# Patient Record
Sex: FEMALE | Race: WHITE | NOT HISPANIC OR LATINO | Employment: OTHER | URBAN - METROPOLITAN AREA
[De-identification: names, ages, dates, MRNs, and addresses within clinical notes are randomized per-mention and may not be internally consistent; named-entity substitution may affect disease eponyms.]

---

## 2017-06-12 ENCOUNTER — HISTORICAL (OUTPATIENT)
Dept: INFUSION THERAPY | Facility: HOSPITAL | Age: 62
End: 2017-06-12

## 2018-07-10 ENCOUNTER — HISTORICAL (OUTPATIENT)
Dept: INFUSION THERAPY | Facility: HOSPITAL | Age: 63
End: 2018-07-10

## 2019-05-26 LAB
CHOLEST SERPL-MSCNC: 130 MG/DL (ref 0–200)
HDLC SERPL-MCNC: 68 MG/DL (ref 35–70)
LDLC SERPL CALC-MCNC: 42 MG/DL (ref 0–160)
TRIGL SERPL-MCNC: 99 MG/DL (ref 40–160)

## 2019-08-19 ENCOUNTER — HISTORICAL (OUTPATIENT)
Dept: INFUSION THERAPY | Facility: HOSPITAL | Age: 64
End: 2019-08-19

## 2020-12-03 ENCOUNTER — HISTORICAL (OUTPATIENT)
Dept: INFUSION THERAPY | Facility: HOSPITAL | Age: 65
End: 2020-12-03

## 2022-04-09 ENCOUNTER — HISTORICAL (OUTPATIENT)
Dept: ADMINISTRATIVE | Facility: HOSPITAL | Age: 67
End: 2022-04-09
Payer: MEDICARE

## 2022-04-25 VITALS
OXYGEN SATURATION: 98 % | DIASTOLIC BLOOD PRESSURE: 70 MMHG | HEIGHT: 63 IN | WEIGHT: 108 LBS | BODY MASS INDEX: 19.14 KG/M2 | SYSTOLIC BLOOD PRESSURE: 110 MMHG

## 2022-07-08 ENCOUNTER — PATIENT OUTREACH (OUTPATIENT)
Dept: ADMINISTRATIVE | Facility: HOSPITAL | Age: 67
End: 2022-07-08
Payer: MEDICARE

## 2022-07-08 NOTE — PROGRESS NOTES
Population Health. Out Reach.  The following record(s)  below were uploaded for Health Maintenance .    5/26/19 LIPID PANEL

## 2022-07-14 DIAGNOSIS — I10 HYPERTENSION, UNSPECIFIED TYPE: ICD-10-CM

## 2022-07-14 DIAGNOSIS — E10.9 TYPE 1 DIABETES MELLITUS WITHOUT COMPLICATION: Primary | ICD-10-CM

## 2022-07-14 DIAGNOSIS — E03.9 HYPOTHYROIDISM, UNSPECIFIED TYPE: ICD-10-CM

## 2022-07-14 RX ORDER — LATANOPROST 50 UG/ML
1 SOLUTION/ DROPS OPHTHALMIC NIGHTLY
COMMUNITY
Start: 2022-01-19

## 2022-07-14 RX ORDER — LEVOTHYROXINE SODIUM 150 UG/1
150 TABLET ORAL
Status: ON HOLD | COMMUNITY
End: 2024-03-03 | Stop reason: ALTCHOICE

## 2022-07-14 RX ORDER — LISINOPRIL 10 MG/1
10 TABLET ORAL DAILY
COMMUNITY

## 2022-07-14 RX ORDER — LORATADINE 10 MG/1
5 TABLET ORAL DAILY
Status: ON HOLD | COMMUNITY
End: 2024-03-03 | Stop reason: ALTCHOICE

## 2022-07-25 ENCOUNTER — OFFICE VISIT (OUTPATIENT)
Dept: INTERNAL MEDICINE | Facility: CLINIC | Age: 67
End: 2022-07-25
Payer: MEDICARE

## 2022-07-25 VITALS
DIASTOLIC BLOOD PRESSURE: 64 MMHG | SYSTOLIC BLOOD PRESSURE: 130 MMHG | OXYGEN SATURATION: 98 % | WEIGHT: 108 LBS | HEART RATE: 96 BPM | HEIGHT: 62 IN | TEMPERATURE: 98 F | BODY MASS INDEX: 19.88 KG/M2

## 2022-07-25 DIAGNOSIS — I15.2 HYPERTENSION DUE TO ENDOCRINE DISORDER: ICD-10-CM

## 2022-07-25 DIAGNOSIS — E03.9 HYPOTHYROIDISM, UNSPECIFIED TYPE: Primary | ICD-10-CM

## 2022-07-25 DIAGNOSIS — Z00.00 MEDICARE ANNUAL WELLNESS VISIT, SUBSEQUENT: ICD-10-CM

## 2022-07-25 DIAGNOSIS — E10.9 TYPE 1 DIABETES MELLITUS WITHOUT COMPLICATION: ICD-10-CM

## 2022-07-25 PROCEDURE — 1159F PR MEDICATION LIST DOCUMENTED IN MEDICAL RECORD: ICD-10-PCS | Mod: CPTII,,, | Performed by: INTERNAL MEDICINE

## 2022-07-25 PROCEDURE — 1160F PR REVIEW ALL MEDS BY PRESCRIBER/CLIN PHARMACIST DOCUMENTED: ICD-10-PCS | Mod: CPTII,,, | Performed by: INTERNAL MEDICINE

## 2022-07-25 PROCEDURE — 3288F FALL RISK ASSESSMENT DOCD: CPT | Mod: CPTII,,, | Performed by: INTERNAL MEDICINE

## 2022-07-25 PROCEDURE — 4010F PR ACE/ARB THEARPY RXD/TAKEN: ICD-10-PCS | Mod: CPTII,,, | Performed by: INTERNAL MEDICINE

## 2022-07-25 PROCEDURE — G0439 PR MEDICARE ANNUAL WELLNESS SUBSEQUENT VISIT: ICD-10-PCS | Mod: ,,, | Performed by: INTERNAL MEDICINE

## 2022-07-25 PROCEDURE — 1159F MED LIST DOCD IN RCRD: CPT | Mod: CPTII,,, | Performed by: INTERNAL MEDICINE

## 2022-07-25 PROCEDURE — 3008F PR BODY MASS INDEX (BMI) DOCUMENTED: ICD-10-PCS | Mod: CPTII,,, | Performed by: INTERNAL MEDICINE

## 2022-07-25 PROCEDURE — G0439 PPPS, SUBSEQ VISIT: HCPCS | Mod: ,,, | Performed by: INTERNAL MEDICINE

## 2022-07-25 PROCEDURE — 1101F PR PT FALLS ASSESS DOC 0-1 FALLS W/OUT INJ PAST YR: ICD-10-PCS | Mod: CPTII,,, | Performed by: INTERNAL MEDICINE

## 2022-07-25 PROCEDURE — 3075F PR MOST RECENT SYSTOLIC BLOOD PRESS GE 130-139MM HG: ICD-10-PCS | Mod: CPTII,,, | Performed by: INTERNAL MEDICINE

## 2022-07-25 PROCEDURE — 1160F RVW MEDS BY RX/DR IN RCRD: CPT | Mod: CPTII,,, | Performed by: INTERNAL MEDICINE

## 2022-07-25 PROCEDURE — 3078F DIAST BP <80 MM HG: CPT | Mod: CPTII,,, | Performed by: INTERNAL MEDICINE

## 2022-07-25 PROCEDURE — 4010F ACE/ARB THERAPY RXD/TAKEN: CPT | Mod: CPTII,,, | Performed by: INTERNAL MEDICINE

## 2022-07-25 PROCEDURE — 1126F PR PAIN SEVERITY QUANTIFIED, NO PAIN PRESENT: ICD-10-PCS | Mod: CPTII,,, | Performed by: INTERNAL MEDICINE

## 2022-07-25 PROCEDURE — 3078F PR MOST RECENT DIASTOLIC BLOOD PRESSURE < 80 MM HG: ICD-10-PCS | Mod: CPTII,,, | Performed by: INTERNAL MEDICINE

## 2022-07-25 PROCEDURE — 3008F BODY MASS INDEX DOCD: CPT | Mod: CPTII,,, | Performed by: INTERNAL MEDICINE

## 2022-07-25 PROCEDURE — 3288F PR FALLS RISK ASSESSMENT DOCUMENTED: ICD-10-PCS | Mod: CPTII,,, | Performed by: INTERNAL MEDICINE

## 2022-07-25 PROCEDURE — 1101F PT FALLS ASSESS-DOCD LE1/YR: CPT | Mod: CPTII,,, | Performed by: INTERNAL MEDICINE

## 2022-07-25 PROCEDURE — 1126F AMNT PAIN NOTED NONE PRSNT: CPT | Mod: CPTII,,, | Performed by: INTERNAL MEDICINE

## 2022-07-25 PROCEDURE — 3075F SYST BP GE 130 - 139MM HG: CPT | Mod: CPTII,,, | Performed by: INTERNAL MEDICINE

## 2022-07-25 RX ORDER — INSULIN ASPART 100 [IU]/ML
67 INJECTION, SOLUTION INTRAVENOUS; SUBCUTANEOUS DAILY
COMMUNITY

## 2022-07-25 RX ORDER — ZOLEDRONIC ACID 5 MG/100ML
5 INJECTION, SOLUTION INTRAVENOUS ONCE
COMMUNITY

## 2022-07-25 NOTE — PROGRESS NOTES
Subjective:      Patient ID: Daisy Calderon is a 67 y.o. female.    Chief Complaint: Annual Exam (Wellness)    Mrs. Villa is a 66-year-old female here today for hospital discharge visit. Medical comorbidities include DM type I, hypertension, and hypothyroidism. Established with Dr. Beltrán who is managing her diabetes and hypothyroidism.  Patient has had episodes of DKA from insulin pump failure however recently has remained stable.  No acute needs or complaints today as such.    Wellness: Today  DM eye exam: May 2021  CRS:  Agreed for Cologuard after a long discussion  MMG: Follow-up next visit order placed today  PPSV23: 2015    The patient's Health Maintenance was reviewed and the following appears to be due at this time:   Health Maintenance Due   Topic Date Due    DEXA Scan  Never done    Colorectal Cancer Screening  Never done    Mammogram  2017        Past Medical History:  Past Medical History:   Diagnosis Date    Diabetes type I     HTN (hypertension)     Hypothyroidism, unspecified     Tobacco user      Past Surgical History:   Procedure Laterality Date    CARPAL TUNNEL RELEASE      CATARACT EXTRACTION       SECTION      CHOLECYSTECTOMY      TOTAL ABDOMINAL HYSTERECTOMY      TUBAL LIGATION       Review of patient's allergies indicates:   Allergen Reactions    Levofloxacin      Other reaction(s): itching, swelling    Oxycodone-aspirin     Brimonidine Nausea And Vomiting     Social History     Socioeconomic History    Marital status: Other   Tobacco Use    Smoking status: Current Every Day Smoker     Packs/day: 0.25    Smokeless tobacco: Never Used   Substance and Sexual Activity    Alcohol use: Yes     Alcohol/week: 6.0 standard drinks     Types: 6 Cans of beer per week     History reviewed. No pertinent family history.    Review of Systems   Constitutional: Negative.         Alert, Oriented and no acute distress   HENT: Negative for congestion, rhinorrhea, sinus pain  "and sore throat.    Eyes: Negative for photophobia and discharge.   Respiratory: Negative for cough, chest tightness, shortness of breath and wheezing.    Cardiovascular: Negative for chest pain, palpitations and leg swelling.   Gastrointestinal: Negative for abdominal distention, constipation, diarrhea, nausea and vomiting.   Genitourinary: Negative for difficulty urinating, dyspareunia, dysuria, frequency, hematuria and menstrual problem.   Musculoskeletal: Negative for arthralgias, joint swelling, myalgias and neck pain.   Skin: Negative for rash.   Allergic/Immunologic: Negative for immunocompromised state.   Neurological: Negative for dizziness, seizures and weakness.   Psychiatric/Behavioral: Negative for agitation and suicidal ideas.       Objective:   /64 (BP Location: Left arm, Patient Position: Sitting, BP Method: Small (Manual))   Pulse 96   Temp 98.4 °F (36.9 °C) (Temporal)   Ht 5' 2" (1.575 m)   Wt 49 kg (108 lb)   SpO2 98%   BMI 19.75 kg/m²       Patient ID: [id number]       ----------------------------  Diabetes type I  HTN (hypertension)  Hypothyroidism, unspecified  Tobacco user     Past Surgical History:   Procedure Laterality Date    CARPAL TUNNEL RELEASE      CATARACT EXTRACTION       SECTION      CHOLECYSTECTOMY      TOTAL ABDOMINAL HYSTERECTOMY      TUBAL LIGATION         Review of patient's allergies indicates:   Allergen Reactions    Levofloxacin      Other reaction(s): itching, swelling    Oxycodone-aspirin     Brimonidine Nausea And Vomiting       Outpatient Medications Marked as Taking for the 22 encounter (Office Visit) with Yana Pearce MD   Medication Sig Dispense Refill    insulin aspart U-100 (NOVOLOG) 100 unit/mL injection Inject 67 Units into the skin Daily.      latanoprost 0.005 % ophthalmic solution Apply 1 drop to eye every evening.      levothyroxine (SYNTHROID) 150 MCG tablet Take 150 mcg by mouth before breakfast.      lisinopriL 10 " "MG tablet Take 10 mg by mouth once daily.      loratadine (CLARITIN) 10 mg tablet Take 5 mg by mouth once daily.      zoledronic acid-mannitol & water (RECLAST) 5 mg/100 mL PgBk Inject 5 mg into the vein once.      [DISCONTINUED] insulin glulisine U-100 (APIDRA) 100 unit/mL InPn pen Inject into the skin.         Social History     Socioeconomic History    Marital status: Other   Tobacco Use    Smoking status: Current Every Day Smoker     Packs/day: 0.25    Smokeless tobacco: Never Used   Substance and Sexual Activity    Alcohol use: Yes     Alcohol/week: 6.0 standard drinks     Types: 6 Cans of beer per week        History reviewed. No pertinent family history.     Patient Care Team:  Yana Pearce MD as PCP - General (Internal Medicine)            Objective:     /64 (BP Location: Left arm, Patient Position: Sitting, BP Method: Small (Manual))   Pulse 96   Temp 98.4 °F (36.9 °C) (Temporal)   Ht 5' 2" (1.575 m)   Wt 49 kg (108 lb)   SpO2 98%   BMI 19.75 kg/m²         No flowsheet data found.  Fall Risk Assessment - Outpatient 7/25/2022   Mobility Status Ambulatory   Number of falls 1   Identified as fall risk 0              Assessment:       ICD-10-CM ICD-9-CM   1. Hypothyroidism, unspecified type  E03.9 244.9   2. Type 1 diabetes mellitus without complication  E10.9 250.01   3. Medicare annual wellness visit, subsequent  Z00.00 V70.0   4. Hypertension due to endocrine disorder  I15.2 405.99     259.9        Plan:     Medicare Annual Wellness and Personalized Prevention Plan:   Fall Risk + Home Safety + Hearing Impairment + Depression Screen + Cognitive Impairment Screen + Health Risk Assessment all reviewed.     Health Maintenance Topics with due status: Not Due       Topic Last Completion Date    Lipid Panel 05/26/2019    Influenza Vaccine 11/01/2021      The patient's Health Maintenance was reviewed and the following appears to be due at this time:   Health Maintenance Due   Topic Date " Due    DEXA Scan  Never done    Colorectal Cancer Screening  Never done    Mammogram  12/14/2017         Advance Care Planning   I attest to discussing Advance Care Planning with patient and/or family member.  Education was provided including the importance of the Health Care Power of , Advance Directives, and/or [place] documentation.  The patient expressed understanding to the importance of this information and discussion.  Goals of Care: The patient expressed that what is most important right now is to focus on:   Length of ACP conversation in minutes:          Medication List with Changes/Refills   Current Medications    INSULIN ASPART U-100 (NOVOLOG) 100 UNIT/ML INJECTION    Inject 67 Units into the skin Daily.       Start Date: --        End Date: --    LATANOPROST 0.005 % OPHTHALMIC SOLUTION    Apply 1 drop to eye every evening.       Start Date: 1/19/2022 End Date: --    LEVOTHYROXINE (SYNTHROID) 150 MCG TABLET    Take 150 mcg by mouth before breakfast.       Start Date: --        End Date: --    LISINOPRIL 10 MG TABLET    Take 10 mg by mouth once daily.       Start Date: --        End Date: --    LORATADINE (CLARITIN) 10 MG TABLET    Take 5 mg by mouth once daily.       Start Date: --        End Date: --    ZOLEDRONIC ACID-MANNITOL & WATER (RECLAST) 5 MG/100 ML PGBK    Inject 5 mg into the vein once.       Start Date: --        End Date: --   Discontinued Medications    INSULIN GLULISINE U-100 (APIDRA) 100 UNIT/ML INPN PEN    Inject into the skin.       Start Date: --        End Date: 7/25/2022        Follow up in about 6 months (around 1/25/2023) for Diabetes. In addition to their scheduled follow up, the patient has also been instructed to follow up on as needed basis.     Assessment/ Plan:      Medicare wellness, subsequest  -labs are reviewed all essentially normal  -patient is advised on importance of watching her carbohydrate intake and saturated fat intake, making the right nutritional  choices and exercising on a regular basis  -up-to-date with the screening    HTN sure to Diabetes mellitus  Well-controlled with current meds, continue   low-sodium diet  Some form of routine aerobic exercises emphasized    Hypothyroidism  -Continue Levothyroxine    Diabetes Tyle 1, Insulin Dependent  -patient advised on the importance of avoiding excessive carbohydrates and sugary food intake and having some form of routine aerobic exercise on a regular basis.      No orders of the defined types were placed in this encounter.        Medication List with Changes/Refills   Current Medications    INSULIN ASPART U-100 (NOVOLOG) 100 UNIT/ML INJECTION    Inject 67 Units into the skin Daily.    LATANOPROST 0.005 % OPHTHALMIC SOLUTION    Apply 1 drop to eye every evening.    LEVOTHYROXINE (SYNTHROID) 150 MCG TABLET    Take 150 mcg by mouth before breakfast.    LISINOPRIL 10 MG TABLET    Take 10 mg by mouth once daily.    LORATADINE (CLARITIN) 10 MG TABLET    Take 5 mg by mouth once daily.    ZOLEDRONIC ACID-MANNITOL & WATER (RECLAST) 5 MG/100 ML PGBK    Inject 5 mg into the vein once.   Discontinued Medications    INSULIN GLULISINE U-100 (APIDRA) 100 UNIT/ML INPN PEN    Inject into the skin.      Medication List with Changes/Refills   Current Medications    INSULIN ASPART U-100 (NOVOLOG) 100 UNIT/ML INJECTION    Inject 67 Units into the skin Daily.       Start Date: --        End Date: --    LATANOPROST 0.005 % OPHTHALMIC SOLUTION    Apply 1 drop to eye every evening.       Start Date: 1/19/2022 End Date: --    LEVOTHYROXINE (SYNTHROID) 150 MCG TABLET    Take 150 mcg by mouth before breakfast.       Start Date: --        End Date: --    LISINOPRIL 10 MG TABLET    Take 10 mg by mouth once daily.       Start Date: --        End Date: --    LORATADINE (CLARITIN) 10 MG TABLET    Take 5 mg by mouth once daily.       Start Date: --        End Date: --    ZOLEDRONIC ACID-MANNITOL & WATER (RECLAST) 5 MG/100 ML PGBK    Inject 5 mg  into the vein once.       Start Date: --        End Date: --   Discontinued Medications    INSULIN GLULISINE U-100 (APIDRA) 100 UNIT/ML INPN PEN    Inject into the skin.       Start Date: --        End Date: 7/25/2022

## 2022-07-27 LAB — BMD RECOMMENDATION EXT: NORMAL

## 2022-10-24 PROBLEM — Z00.00 MEDICARE ANNUAL WELLNESS VISIT, SUBSEQUENT: Status: RESOLVED | Noted: 2022-07-25 | Resolved: 2022-10-24

## 2022-10-27 LAB — HBA1C MFR BLD: 6.5 % (ref 4–6)

## 2023-01-06 ENCOUNTER — DOCUMENTATION ONLY (OUTPATIENT)
Dept: ADMINISTRATIVE | Facility: HOSPITAL | Age: 68
End: 2023-01-06
Payer: MEDICARE

## 2023-01-20 DIAGNOSIS — E10.9 TYPE 1 DIABETES MELLITUS WITHOUT COMPLICATION: ICD-10-CM

## 2023-01-20 DIAGNOSIS — E03.9 HYPOTHYROIDISM, UNSPECIFIED TYPE: Primary | ICD-10-CM

## 2023-01-24 ENCOUNTER — TELEPHONE (OUTPATIENT)
Dept: ADMINISTRATIVE | Facility: HOSPITAL | Age: 68
End: 2023-01-24
Payer: MEDICARE

## 2023-01-24 NOTE — TELEPHONE ENCOUNTER
----- Message from Tish Moura MA sent at 1/20/2023 11:57 AM CST -----  Regarding: Dr CLYDE RODRIGUEZ Monday 1-30-23       1. Are there any outstanding Labs, imaging or referrals in the patient's chart?      6 month follow up for Diabetes/Thyroid    Non fasting labs ordered and ready to do    Last wellness 7-25-22           2. . Has the patient been seen in an ER, urgent care clinic, or any other health care    provider since their last visit? If yes when and where?

## 2023-01-27 DIAGNOSIS — M81.0 OSTEOPOROSIS, UNSPECIFIED OSTEOPOROSIS TYPE, UNSPECIFIED PATHOLOGICAL FRACTURE PRESENCE: Primary | ICD-10-CM

## 2023-01-27 LAB
CHOLEST SERPL-MSCNC: 212 MG/DL (ref 0–200)
HDLC SERPL-MCNC: 106 MG/DL (ref 35–70)
LDLC SERPL CALC-MCNC: 85 MG/DL (ref 0–160)
TRIGL SERPL-MCNC: 107 MG/DL (ref 40–160)

## 2023-01-27 RX ORDER — HEPARIN 100 UNIT/ML
500 SYRINGE INTRAVENOUS
Status: CANCELLED | OUTPATIENT
Start: 2023-01-30

## 2023-01-27 RX ORDER — SODIUM CHLORIDE 0.9 % (FLUSH) 0.9 %
10 SYRINGE (ML) INJECTION
Status: CANCELLED | OUTPATIENT
Start: 2023-01-30

## 2023-01-27 RX ORDER — ZOLEDRONIC ACID 5 MG/100ML
5 INJECTION, SOLUTION INTRAVENOUS
Status: CANCELLED | OUTPATIENT
Start: 2023-01-30

## 2023-01-30 ENCOUNTER — OFFICE VISIT (OUTPATIENT)
Dept: INTERNAL MEDICINE | Facility: CLINIC | Age: 68
End: 2023-01-30
Payer: MEDICARE

## 2023-01-30 VITALS
WEIGHT: 109.63 LBS | SYSTOLIC BLOOD PRESSURE: 128 MMHG | OXYGEN SATURATION: 98 % | DIASTOLIC BLOOD PRESSURE: 68 MMHG | HEIGHT: 62 IN | HEART RATE: 89 BPM | TEMPERATURE: 99 F | BODY MASS INDEX: 20.18 KG/M2

## 2023-01-30 DIAGNOSIS — E03.9 HYPOTHYROIDISM, UNSPECIFIED TYPE: ICD-10-CM

## 2023-01-30 DIAGNOSIS — Z12.11 SCREEN FOR COLON CANCER: Primary | ICD-10-CM

## 2023-01-30 DIAGNOSIS — Z72.0 TOBACCO USER: ICD-10-CM

## 2023-01-30 DIAGNOSIS — E10.9 TYPE 1 DIABETES MELLITUS WITHOUT COMPLICATION: ICD-10-CM

## 2023-01-30 PROCEDURE — 1159F MED LIST DOCD IN RCRD: CPT | Mod: CPTII,,, | Performed by: INTERNAL MEDICINE

## 2023-01-30 PROCEDURE — 3008F PR BODY MASS INDEX (BMI) DOCUMENTED: ICD-10-PCS | Mod: CPTII,,, | Performed by: INTERNAL MEDICINE

## 2023-01-30 PROCEDURE — 3078F DIAST BP <80 MM HG: CPT | Mod: CPTII,,, | Performed by: INTERNAL MEDICINE

## 2023-01-30 PROCEDURE — 1160F RVW MEDS BY RX/DR IN RCRD: CPT | Mod: CPTII,,, | Performed by: INTERNAL MEDICINE

## 2023-01-30 PROCEDURE — 3074F PR MOST RECENT SYSTOLIC BLOOD PRESSURE < 130 MM HG: ICD-10-PCS | Mod: CPTII,,, | Performed by: INTERNAL MEDICINE

## 2023-01-30 PROCEDURE — 3288F FALL RISK ASSESSMENT DOCD: CPT | Mod: CPTII,,, | Performed by: INTERNAL MEDICINE

## 2023-01-30 PROCEDURE — 3078F PR MOST RECENT DIASTOLIC BLOOD PRESSURE < 80 MM HG: ICD-10-PCS | Mod: CPTII,,, | Performed by: INTERNAL MEDICINE

## 2023-01-30 PROCEDURE — 1101F PT FALLS ASSESS-DOCD LE1/YR: CPT | Mod: CPTII,,, | Performed by: INTERNAL MEDICINE

## 2023-01-30 PROCEDURE — 3008F BODY MASS INDEX DOCD: CPT | Mod: CPTII,,, | Performed by: INTERNAL MEDICINE

## 2023-01-30 PROCEDURE — 1101F PR PT FALLS ASSESS DOC 0-1 FALLS W/OUT INJ PAST YR: ICD-10-PCS | Mod: CPTII,,, | Performed by: INTERNAL MEDICINE

## 2023-01-30 PROCEDURE — 3288F PR FALLS RISK ASSESSMENT DOCUMENTED: ICD-10-PCS | Mod: CPTII,,, | Performed by: INTERNAL MEDICINE

## 2023-01-30 PROCEDURE — 1159F PR MEDICATION LIST DOCUMENTED IN MEDICAL RECORD: ICD-10-PCS | Mod: CPTII,,, | Performed by: INTERNAL MEDICINE

## 2023-01-30 PROCEDURE — 3074F SYST BP LT 130 MM HG: CPT | Mod: CPTII,,, | Performed by: INTERNAL MEDICINE

## 2023-01-30 PROCEDURE — 99214 PR OFFICE/OUTPT VISIT, EST, LEVL IV, 30-39 MIN: ICD-10-PCS | Mod: ,,, | Performed by: INTERNAL MEDICINE

## 2023-01-30 PROCEDURE — 1126F AMNT PAIN NOTED NONE PRSNT: CPT | Mod: CPTII,,, | Performed by: INTERNAL MEDICINE

## 2023-01-30 PROCEDURE — 1160F PR REVIEW ALL MEDS BY PRESCRIBER/CLIN PHARMACIST DOCUMENTED: ICD-10-PCS | Mod: CPTII,,, | Performed by: INTERNAL MEDICINE

## 2023-01-30 PROCEDURE — 99214 OFFICE O/P EST MOD 30 MIN: CPT | Mod: ,,, | Performed by: INTERNAL MEDICINE

## 2023-01-30 PROCEDURE — 1126F PR PAIN SEVERITY QUANTIFIED, NO PAIN PRESENT: ICD-10-PCS | Mod: CPTII,,, | Performed by: INTERNAL MEDICINE

## 2023-01-30 NOTE — PROGRESS NOTES
Subjective:      Patient ID: Daisy Calderon is a 67 y.o. female.    Chief Complaint: Follow-up (6 month for Diabetes/Thyroid/)    Mrs. Villa is a 66-year-old female here today for her six-month follow-up.  Medical comorbidities include DM type I, hypertension, and hypothyroidism. Established with Dr. Beltrán who is managing her diabetes and hypothyroidism.  Patient has had episodes of DKA from insulin pump failure however recently has remained stable.  No acute needs or complaints today as such.  We discussed importance of getting mammogram however at this time she is refusing and understands the risks of not undergoing screening.    Wellness:22  DM eye exam: May 2021  CRS:  Cologuard   MMG:  Refuses  Pn: UTD    The patient's Health Maintenance was reviewed and the following appears to be due at this time:   Health Maintenance Due   Topic Date Due    Colorectal Cancer Screening  Never done    Mammogram  2017    Influenza Vaccine (1) 2022        Past Medical History:  Past Medical History:   Diagnosis Date    Diabetes type I     HTN (hypertension)     Hypothyroidism, unspecified     Tobacco user      Past Surgical History:   Procedure Laterality Date    CARPAL TUNNEL RELEASE      CATARACT EXTRACTION       SECTION      CHOLECYSTECTOMY      TOTAL ABDOMINAL HYSTERECTOMY      TUBAL LIGATION       Review of patient's allergies indicates:   Allergen Reactions    Levofloxacin      Other reaction(s): itching, swelling    Oxycodone-aspirin     Brimonidine Nausea And Vomiting     Social History     Socioeconomic History    Marital status: Other   Tobacco Use    Smoking status: Every Day     Packs/day: 0.25     Types: Cigarettes    Smokeless tobacco: Never   Substance and Sexual Activity    Alcohol use: Yes     Alcohol/week: 6.0 standard drinks     Types: 6 Cans of beer per week     History reviewed. No pertinent family history.    Review of Systems    A comprehensive review of systems was  "performed and is negative except for that stated above  Objective:   /68 (BP Location: Left arm, Patient Position: Sitting, BP Method: Small (Manual))   Pulse 89   Temp 99.3 °F (37.4 °C) (Temporal)   Ht 5' 2" (1.575 m)   Wt 49.7 kg (109 lb 9.6 oz)   SpO2 98%   BMI 20.05 kg/m²     Physical Exam  Constitutional:       Appearance: Normal appearance.   HENT:      Head: Normocephalic and atraumatic.      Nose: Nose normal.      Mouth/Throat:      Mouth: Mucous membranes are moist.      Pharynx: Oropharynx is clear.   Eyes:      Extraocular Movements: Extraocular movements intact.      Pupils: Pupils are equal, round, and reactive to light.   Cardiovascular:      Rate and Rhythm: Normal rate and regular rhythm.      Pulses: Normal pulses.   Pulmonary:      Effort: Pulmonary effort is normal.      Breath sounds: Normal breath sounds.   Abdominal:      General: Bowel sounds are normal.      Palpations: Abdomen is soft.   Musculoskeletal:         General: Normal range of motion.      Cervical back: Normal range of motion and neck supple.   Skin:     General: Skin is warm.   Neurological:      General: No focal deficit present.      Mental Status: She is alert and oriented to person, place, and time. Mental status is at baseline.   Psychiatric:         Mood and Affect: Mood normal.     Assessment/ Plan:   1. Screen for colon cancer  -     Cologuard Screening (Multitarget Stool DNA); Future; Expected date: 01/30/2023    2. Hypothyroidism, unspecified type  Assessment & Plan:    Continue levothyroxine 150 mcg p.o. daily  Take medicine on an empty stomach with water (no other medications or beverages). Wait 30 minutes to eat or drink.  Report any symptoms of thinning hair, breaking nails, fatigue, weight gain or loss, palpitations.       3. Type 1 diabetes mellitus without complication  Assessment & Plan:  -patient advised on the importance of avoiding excessive carbohydrates and sugary food intake and having some " form of routine aerobic exercise on a regular basis.  -continue with insulin pump, keep follow-up with endocrinology      4. Tobacco user  Assessment & Plan:  Assistance with smoking cessation was offered, including:  [x]  Medications  [x]  Counseling  []  Printed Information on Smoking Cessation  []  Referral to a Smoking Cessation Program    Patient was counseled regarding smoking for 3-10 minutes.

## 2023-01-30 NOTE — ASSESSMENT & PLAN NOTE
Continue levothyroxine 150 mcg p.o. daily  Take medicine on an empty stomach with water (no other medications or beverages). Wait 30 minutes to eat or drink.  Report any symptoms of thinning hair, breaking nails, fatigue, weight gain or loss, palpitations.

## 2023-01-30 NOTE — ASSESSMENT & PLAN NOTE
-patient advised on the importance of avoiding excessive carbohydrates and sugary food intake and having some form of routine aerobic exercise on a regular basis.  -continue with insulin pump, keep follow-up with endocrinology

## 2023-01-30 NOTE — ASSESSMENT & PLAN NOTE
Assistance with smoking cessation was offered, including:  [x]  Medications  [x]  Counseling  []  Printed Information on Smoking Cessation  []  Referral to a Smoking Cessation Program    Patient was counseled regarding smoking for 3-10 minutes.

## 2023-02-01 DIAGNOSIS — E10.9 TYPE 1 DIABETES MELLITUS WITHOUT COMPLICATION: ICD-10-CM

## 2023-02-01 DIAGNOSIS — E03.9 HYPOTHYROIDISM, UNSPECIFIED TYPE: ICD-10-CM

## 2023-02-07 ENCOUNTER — INFUSION (OUTPATIENT)
Dept: INFUSION THERAPY | Facility: HOSPITAL | Age: 68
End: 2023-02-07
Attending: INTERNAL MEDICINE
Payer: MEDICARE

## 2023-02-07 VITALS — SYSTOLIC BLOOD PRESSURE: 147 MMHG | OXYGEN SATURATION: 99 % | DIASTOLIC BLOOD PRESSURE: 67 MMHG | HEART RATE: 81 BPM

## 2023-02-07 DIAGNOSIS — M81.0 OSTEOPOROSIS, UNSPECIFIED OSTEOPOROSIS TYPE, UNSPECIFIED PATHOLOGICAL FRACTURE PRESENCE: Primary | ICD-10-CM

## 2023-02-07 PROCEDURE — 96365 THER/PROPH/DIAG IV INF INIT: CPT

## 2023-02-07 PROCEDURE — 63600175 PHARM REV CODE 636 W HCPCS

## 2023-02-07 RX ORDER — HEPARIN 100 UNIT/ML
500 SYRINGE INTRAVENOUS
Status: DISCONTINUED | OUTPATIENT
Start: 2023-02-07 | End: 2023-02-07 | Stop reason: HOSPADM

## 2023-02-07 RX ORDER — ZOLEDRONIC ACID 5 MG/100ML
5 INJECTION, SOLUTION INTRAVENOUS
Status: COMPLETED | OUTPATIENT
Start: 2023-02-07 | End: 2023-02-07

## 2023-02-07 RX ORDER — HEPARIN 100 UNIT/ML
500 SYRINGE INTRAVENOUS
Status: CANCELLED | OUTPATIENT
Start: 2023-02-07

## 2023-02-07 RX ORDER — SODIUM CHLORIDE 0.9 % (FLUSH) 0.9 %
10 SYRINGE (ML) INJECTION
Status: CANCELLED | OUTPATIENT
Start: 2023-02-07

## 2023-02-07 RX ORDER — SODIUM CHLORIDE 0.9 % (FLUSH) 0.9 %
10 SYRINGE (ML) INJECTION
Status: DISCONTINUED | OUTPATIENT
Start: 2023-02-07 | End: 2023-02-07 | Stop reason: HOSPADM

## 2023-02-07 RX ORDER — ZOLEDRONIC ACID 5 MG/100ML
5 INJECTION, SOLUTION INTRAVENOUS
Status: CANCELLED | OUTPATIENT
Start: 2023-02-07

## 2023-02-07 RX ADMIN — ZOLEDRONIC ACID 5 MG: 5 INJECTION, SOLUTION INTRAVENOUS at 02:02

## 2023-02-15 ENCOUNTER — TELEPHONE (OUTPATIENT)
Dept: INTERNAL MEDICINE | Facility: CLINIC | Age: 68
End: 2023-02-15
Payer: MEDICARE

## 2023-02-15 LAB — NONINV COLON CA DNA+OCC BLD SCRN STL QL: NEGATIVE

## 2023-02-15 NOTE — TELEPHONE ENCOUNTER
----- Message from Yana Pearce MD sent at 2/15/2023  9:53 AM CST -----  Please inform patient I have reviewed the results of her Cologuard and they were negative.  We will repeat again in 3 years.

## 2023-06-09 LAB
LEFT EYE DM RETINOPATHY: POSITIVE
RIGHT EYE DM RETINOPATHY: POSITIVE

## 2023-07-18 DIAGNOSIS — E03.9 HYPOTHYROIDISM, UNSPECIFIED TYPE: Primary | ICD-10-CM

## 2023-07-18 DIAGNOSIS — I10 HYPERTENSION, UNSPECIFIED TYPE: ICD-10-CM

## 2023-07-18 DIAGNOSIS — E10.9 TYPE 1 DIABETES MELLITUS WITHOUT COMPLICATION: ICD-10-CM

## 2023-07-19 ENCOUNTER — TELEPHONE (OUTPATIENT)
Dept: INTERNAL MEDICINE | Facility: CLINIC | Age: 68
End: 2023-07-19
Payer: MEDICARE

## 2023-07-19 ENCOUNTER — TELEPHONE (OUTPATIENT)
Dept: ADMINISTRATIVE | Facility: HOSPITAL | Age: 68
End: 2023-07-19
Payer: MEDICARE

## 2023-07-19 NOTE — TELEPHONE ENCOUNTER
----- Message from Tish Moura MA sent at 7/18/2023 12:49 PM CDT -----  Regarding: Dr CLYDE RODRIGUEZ Wednesday 7-26-23       1. Are there any outstanding Labs, imaging or referrals in the patient's chart?      Wellness Appointment    Fasting wellness labs ordered and ready to do.     Last Wellness 7-25-23           2. . Has the patient been seen in an ER, urgent care clinic, or any other health care    provider since their last visit? If yes when and where?

## 2023-07-19 NOTE — TELEPHONE ENCOUNTER
Pt was called as a reminder of her appt. On the 26 of July. Pt visit is for a Wellness. PT does have some fasting labs to do prior to her appt. Pt could not understand from Ade what, and why she needed to do the lab work. I also spoke with the Pt trying to explain that it was a wellness visit, and she is a diabetic, along with having a thyroid condition. I stated to Pt that she would normally be doing lab work prior to each visit. Pt stated that she did lab work at Dr Beltrán's office last year. Before I could fully explain information to Pt, her phone disconnected. Call Pt back three separate time, not able to get in touch with the Pt. The phone would state wireless customer not available.  I requested lab work from Dr Beltrán's office.

## 2023-07-21 NOTE — TELEPHONE ENCOUNTER
Pt called Back stating we were disconnected last time we spoke. Pt stated she call Dr Betlrán's office and requested they send us her last labs. Pt stated she did them in April. We do not have the results as of now. I stated to Pt, If Dr Pearce needed any other labs she would let her know at her visit.

## 2023-07-26 ENCOUNTER — OFFICE VISIT (OUTPATIENT)
Dept: INTERNAL MEDICINE | Facility: CLINIC | Age: 68
End: 2023-07-26
Payer: MEDICARE

## 2023-07-26 VITALS — WEIGHT: 104.63 LBS | HEIGHT: 62 IN | BODY MASS INDEX: 19.25 KG/M2

## 2023-07-26 DIAGNOSIS — E03.9 ACQUIRED HYPOTHYROIDISM: ICD-10-CM

## 2023-07-26 DIAGNOSIS — Z72.0 TOBACCO USER: ICD-10-CM

## 2023-07-26 DIAGNOSIS — E10.9 TYPE 1 DIABETES MELLITUS WITHOUT COMPLICATION: Primary | ICD-10-CM

## 2023-07-26 DIAGNOSIS — Z00.00 MEDICARE ANNUAL WELLNESS VISIT, SUBSEQUENT: ICD-10-CM

## 2023-07-26 DIAGNOSIS — I15.2 HYPERTENSION DUE TO ENDOCRINE DISORDER: ICD-10-CM

## 2023-07-26 PROCEDURE — 3008F PR BODY MASS INDEX (BMI) DOCUMENTED: ICD-10-PCS | Mod: CPTII,,, | Performed by: INTERNAL MEDICINE

## 2023-07-26 PROCEDURE — 1124F PR ADV CARE PLAN DISCUSSED, UNABLE/UNWILL DOC PLAN OR SURROGATE: ICD-10-PCS | Mod: CPTII,,, | Performed by: INTERNAL MEDICINE

## 2023-07-26 PROCEDURE — 1126F PR PAIN SEVERITY QUANTIFIED, NO PAIN PRESENT: ICD-10-PCS | Mod: CPTII,,, | Performed by: INTERNAL MEDICINE

## 2023-07-26 PROCEDURE — 1159F MED LIST DOCD IN RCRD: CPT | Mod: CPTII,,, | Performed by: INTERNAL MEDICINE

## 2023-07-26 PROCEDURE — 4010F ACE/ARB THERAPY RXD/TAKEN: CPT | Mod: CPTII,,, | Performed by: INTERNAL MEDICINE

## 2023-07-26 PROCEDURE — 1160F PR REVIEW ALL MEDS BY PRESCRIBER/CLIN PHARMACIST DOCUMENTED: ICD-10-PCS | Mod: CPTII,,, | Performed by: INTERNAL MEDICINE

## 2023-07-26 PROCEDURE — G0439 PPPS, SUBSEQ VISIT: HCPCS | Mod: ,,, | Performed by: INTERNAL MEDICINE

## 2023-07-26 PROCEDURE — 3288F PR FALLS RISK ASSESSMENT DOCUMENTED: ICD-10-PCS | Mod: CPTII,,, | Performed by: INTERNAL MEDICINE

## 2023-07-26 PROCEDURE — 3288F FALL RISK ASSESSMENT DOCD: CPT | Mod: CPTII,,, | Performed by: INTERNAL MEDICINE

## 2023-07-26 PROCEDURE — 1160F RVW MEDS BY RX/DR IN RCRD: CPT | Mod: CPTII,,, | Performed by: INTERNAL MEDICINE

## 2023-07-26 PROCEDURE — G0439 PR MEDICARE ANNUAL WELLNESS SUBSEQUENT VISIT: ICD-10-PCS | Mod: ,,, | Performed by: INTERNAL MEDICINE

## 2023-07-26 PROCEDURE — 3008F BODY MASS INDEX DOCD: CPT | Mod: CPTII,,, | Performed by: INTERNAL MEDICINE

## 2023-07-26 PROCEDURE — 1126F AMNT PAIN NOTED NONE PRSNT: CPT | Mod: CPTII,,, | Performed by: INTERNAL MEDICINE

## 2023-07-26 PROCEDURE — 1124F ACP DISCUSS-NO DSCNMKR DOCD: CPT | Mod: CPTII,,, | Performed by: INTERNAL MEDICINE

## 2023-07-26 PROCEDURE — 1101F PT FALLS ASSESS-DOCD LE1/YR: CPT | Mod: CPTII,,, | Performed by: INTERNAL MEDICINE

## 2023-07-26 PROCEDURE — 1159F PR MEDICATION LIST DOCUMENTED IN MEDICAL RECORD: ICD-10-PCS | Mod: CPTII,,, | Performed by: INTERNAL MEDICINE

## 2023-07-26 PROCEDURE — 4010F PR ACE/ARB THEARPY RXD/TAKEN: ICD-10-PCS | Mod: CPTII,,, | Performed by: INTERNAL MEDICINE

## 2023-07-26 PROCEDURE — 1101F PR PT FALLS ASSESS DOC 0-1 FALLS W/OUT INJ PAST YR: ICD-10-PCS | Mod: CPTII,,, | Performed by: INTERNAL MEDICINE

## 2023-07-26 NOTE — PROGRESS NOTES
Patient ID: [id number]     Chief Complaint: Medicare AWV (Wellness/)      HPI:     Daisy Calderon is a 68 y.o. female here today for a Medicare Wellness. No other complaints today.     Mrs. Villa is a 66-year-old female here today for her six-month follow-up and Medicare wellness visit..  Medical comorbidities include DM type I, hypertension, and hypothyroidism. Established with Dr. Beltrán who is managing her diabetes and hypothyroidism.  Overall doing well.  Has recently got labs done with Endocrinology and records will be requested.  As per patient her hemoglobin A1c was noted to be at 6.8.    Wellness:23  DM eye exam: May 2021  CRS:  [facility]   MMG:  Refuses  Pn: UTD    ----------------------------  Diabetes type I  HTN (hypertension)  Hypothyroidism, unspecified  Tobacco user     Past Surgical History:   Procedure Laterality Date    CARPAL TUNNEL RELEASE      CATARACT EXTRACTION       SECTION      CHOLECYSTECTOMY      TOTAL ABDOMINAL HYSTERECTOMY      TUBAL LIGATION         Review of patient's allergies indicates:   Allergen Reactions    Levofloxacin      Other reaction(s): itching, swelling    Oxycodone-aspirin     Brimonidine Nausea And Vomiting       Outpatient Medications Marked as Taking for the 23 encounter (Office Visit) with Yana Pearce MD   Medication Sig Dispense Refill    insulin aspart U-100 (NOVOLOG) 100 unit/mL injection Inject 67 Units into the skin Daily. Average taken 34 units daily      Lactobacillus rhamnosus GG (CULTURELLE) 10 billion cell capsule Take 1 capsule by mouth daily as needed.      latanoprost 0.005 % ophthalmic solution Apply 1 drop to eye every evening.      levothyroxine (SYNTHROID) 150 MCG tablet Take 150 mcg by mouth before breakfast. Mon - Sat no pill on Sun      lisinopriL 10 MG tablet Take 10 mg by mouth once daily.      loratadine (CLARITIN) 10 mg tablet Take 5 mg by mouth once daily.      zoledronic acid-mannitol & water (RECLAST) 5  mg/100 mL PgBk Inject 5 mg into the vein once.         Social History     Socioeconomic History    Marital status: Other   Tobacco Use    Smoking status: Every Day     Packs/day: 0.25     Types: Cigarettes    Smokeless tobacco: Never   Substance and Sexual Activity    Alcohol use: Yes     Alcohol/week: 6.0 standard drinks     Types: 6 Cans of beer per week     Social Determinants of Health     Financial Resource Strain: Low Risk     Difficulty of Paying Living Expenses: Not hard at all   Food Insecurity: No Food Insecurity    Worried About Running Out of Food in the Last Year: Never true    Ran Out of Food in the Last Year: Never true   Transportation Needs: No Transportation Needs    Lack of Transportation (Medical): No    Lack of Transportation (Non-Medical): No   Physical Activity: Sufficiently Active    Days of Exercise per Week: 5 days    Minutes of Exercise per Session: 30 min   Stress: No Stress Concern Present    Feeling of Stress : Not at all   Social Connections: Moderately Integrated    Frequency of Communication with Friends and Family: Twice a week    Frequency of Social Gatherings with Friends and Family: Once a week    Attends [employer] Services: More than 4 times per year    Active Member of Clubs or Organizations: No    Attends Club or Organization Meetings: Never    Marital Status: Living with partner   Housing Stability: Low Risk     Unable to Pay for Housing in the Last Year: No    Number of Places Lived in the Last Year: 1    Unstable Housing in the Last Year: No        History reviewed. No pertinent family history.     Patient Care Team:  Yana Pearce MD as PCP - General (Internal Medicine)  Freeman Beltrán MD as Consulting Physician (Endocrinology)     Opioid Screening: Patient medication list reviewed, patient is not taking prescription opioids. Patient is not using additional opioids than prescribed. Patient is at low risk of substance abuse based on this opioid use history.        Subjective:     ROS  A comprehensive review of systems is obtained and is essentially negative except for that stated in the HPI     Patient Reported Health Risk Assessment  What [age] 65-69  Are you male or female?: Female  During the past four weeks, how much have you been bothered by emotional problems such as feeling anxious, depressed, irritable, sad, or downhearted and blue?: Not at all  During the past five weeks, has your physical and/or emotional health limited your social activities with family, friends, neighbors, or groups?: Not at all  During the past four weeks, how much bodily pain have you generally had?: Very mild pain  During the past four weeks, was someone available to help if you needed and wanted help?: Yes, as much as I wanted  During the past four weeks, what was the hardest physical activity you could do for at least two minutes?: Moderate  Can you get to places out of walking distance without help?  (For example, can you travel alone on buses or taxis, or drive your own car?): Yes  Can you go shopping for groceries or clothes without someone's help?: Yes  Can you prepare your own meals?: Yes  Can you do your own housework without help?: Yes  Because of any health problems, do you need the help of another person with your personal care needs such as eating, bathing, dressing, or getting around the house?: No  Can you handle your own money without help?: Yes  During the past four weeks, how would you rate your health in general?: Very good  How have things been going for you during the past four weeks?: Very well  Are you having difficulties driving your car?: No  Do you always fasten your seat belt when you are in a car?: Yes, usually  How often in the past four weeks have you been bothered by falling or dizzy when standing up?: Never  How often in the past four weeks have you been bothered by sexual problems?: Never  How often in the past four weeks have you been bothered by  "trouble eating well?: Never  How often in the past four weeks have you been bothered by teeth or denture problems?: Never  How often in the past four weeks have you been bothered with problems using the telephone?: Never  How often in the past four weeks have you been bothered by tiredness or fatigue?: Never  Have you fallen two or more times in the past year?: No  Are you afraid of falling?: Yes  Are you a smoker?: Yes, I'm not ready to quit  During the past four weeks, how many drinks of wine, beer, or other alcoholic beverages did you have?: 2-5 drinks per weeks  Do you exercise for about 20 minutes three or more days a week?: Yes, most of the time  Have you been given any information to help you with hazards in your house that might hurt you?: Yes  Have you been given any information to help you with keeping track of your medications?: Yes  How often do you have trouble taking medicines the way you've been told to take them?: I always take them as prescribed  How confident are you that you can control and manage most of your health problems?: Very confident  What is your race? (Check all that apply.):     Objective:     BP (P) 138/64 (BP Location: Left arm, Patient Position: Sitting, BP Method: Small (Manual))   Pulse (P) 107   Temp (P) 98.7 °F (37.1 °C) (Temporal)   Ht 5' 2" (1.575 m)   Wt 47.4 kg (104 lb 9.6 oz)   SpO2 (P) 97%   BMI 19.13 kg/m²     Physical Exam  Constitutional:       Appearance: Normal appearance.   HENT:      Head: Normocephalic and atraumatic.      Nose: Nose normal.      Mouth/Throat:      Mouth: Mucous membranes are moist.      Pharynx: Oropharynx is clear.   Eyes:      Extraocular Movements: Extraocular movements intact.      Pupils: Pupils are equal, round, and reactive to light.   Cardiovascular:      Rate and Rhythm: Normal rate and regular rhythm.      Pulses: Normal pulses.           Dorsalis pedis pulses are 2+ on the right side and 2+ on the left side.   Pulmonary: "      Effort: Pulmonary effort is normal.      Breath sounds: Normal breath sounds.   Abdominal:      General: Bowel sounds are normal.      Palpations: Abdomen is soft.   Musculoskeletal:         General: Normal range of motion.      Cervical back: Normal range of motion and neck supple.   Feet:      Right foot:      Protective Sensation: 3 sites tested.  3 sites sensed.      Skin integrity: No ulcer, skin breakdown or erythema.      Toenail Condition: Right toenails are normal.      Left foot:      Protective Sensation: 3 sites tested.  3 sites sensed.      Skin integrity: No ulcer, skin breakdown or erythema.      Toenail Condition: Left toenails are normal.   Skin:     General: Skin is warm.   Neurological:      General: No focal deficit present.      Mental Status: She is alert and oriented to person, place, and time. Mental status is at baseline.   Psychiatric:         Mood and Affect: Mood normal.         No flowsheet data found.  Fall Risk Assessment - Outpatient 7/26/2023 2/7/2023 1/30/2023 7/25/2022   Mobility Status Ambulatory Ambulatory Ambulatory Ambulatory   Number of falls 1 0 0 1   Identified as fall risk 0 0 0 0           Depression Screening  Over the past two weeks, has the patient felt down, depressed, or hopeless?: No  Over the past two weeks, has the patient felt little interest or pleasure in doing things?: No  Functional Ability/Safety Screening  Was the patient's timed Up & Go test unsteady or longer than 30 seconds?: No  Does the patient need help with phone, transportation, shopping, preparing meals, housework, laundry, meds, or managing money?: No  Does the patient's home have rugs in the hallway, lack grab bars in the bathroom, lack handrails on the stairs or have poor lighting?: No  Have you noticed any hearing difficulties?: No  Cognitive Function (Assessed through direct observation with due consideration of information obtained by way of patient reports and/or concerns raised by  family, friends, caretakers, or others)    Does the patient repeat questions/statements in the same day?: No  Does the patient have trouble remembering the date, year, and time?: No  Does the patient have difficulty managing finances?: No  Does the patient have a decreased sense of direction?: No      Assessment:     Problem List Items Addressed This Visit          Cardiac/Vascular    Hypertension due to endocrine disorder       Endocrine    Type 1 diabetes mellitus - Primary (Chronic)     -patient advised on the importance of avoiding excessive carbohydrates and sugary food intake and having some form of routine aerobic exercise on a regular basis.  -continue with insulin pump, keep follow-up with endocrinology         Hypothyroidism     - Synthroid 150 mcg p.o. daily, continue            Other    Tobacco user     Assistance with smoking cessation was offered, including:  [x]  Medications  [x]  Counseling  []  Printed Information on Smoking Cessation  []  Referral to a Smoking Cessation Program    Patient was counseled regarding smoking for 3-10 minutes.          RESOLVED: Medicare annual wellness visit, subsequent     -labs are reviewed all essentially normal  -patient is advised on importance of watching her carbohydrate intake and saturated fat intake, making the right nutritional choices and exercising on a regular basis  -up-to-date with the screening            Plan:     Medicare Annual Wellness and Personalized Prevention Plan:   Fall Risk + Home Safety + Hearing Impairment + Depression Screen + Cognitive Impairment Screen + Health Risk Assessment all reviewed.       Health Maintenance Topics with due status: Not Due       Topic Last Completion Date    Influenza Vaccine 11/01/2021    DEXA Scan 07/27/2022    Eye Exam 09/20/2022    Lipid Panel 01/26/2023    Colorectal Cancer Screening 02/06/2023    Hemoglobin A1c 04/27/2023      The patient's Health Maintenance was reviewed and the following appears to be due  at this time:   Health Maintenance Due   Topic Date Due    Hepatitis C Screening  Never done    Diabetes Urine Screening  Never done    Foot Exam  Never done    Low Dose Statin  Never done    Shingles Vaccine (1 of 2) Never done    TETANUS VACCINE  10/25/2015    Mammogram  12/14/2017    COVID-19 Vaccine (6 - Moderna series) 11/21/2022         Advance Care Planning     Date: 07/26/2023  Patient did not wish or was not able to name a surrogate decision maker or provide an Advance Care Plan.             Medication List with Changes/Refills   Current Medications    INSULIN ASPART U-100 (NOVOLOG) 100 UNIT/ML INJECTION    Inject 67 Units into the skin Daily. Average taken 34 units daily       Start Date: --        End Date: --    LACTOBACILLUS RHAMNOSUS GG (CULTURELLE) 10 BILLION CELL CAPSULE    Take 1 capsule by mouth daily as needed.       Start Date: --        End Date: --    LATANOPROST 0.005 % OPHTHALMIC SOLUTION    Apply 1 drop to eye every evening.       Start Date: 1/19/2022 End Date: --    LEVOTHYROXINE (SYNTHROID) 150 MCG TABLET    Take 150 mcg by mouth before breakfast. Mon - Sat no pill on Sun       Start Date: --        End Date: --    LISINOPRIL 10 MG TABLET    Take 10 mg by mouth once daily.       Start Date: --        End Date: --    LORATADINE (CLARITIN) 10 MG TABLET    Take 5 mg by mouth once daily.       Start Date: --        End Date: --    ZOLEDRONIC ACID-MANNITOL & WATER (RECLAST) 5 MG/100 ML PGBK    Inject 5 mg into the vein once.       Start Date: --        End Date: --        No follow-ups on file. In addition to their scheduled follow up, the patient has also been instructed to follow up on as needed basis.

## 2023-07-26 NOTE — LETTER
AUTHORIZATION FOR RELEASE OF   CONFIDENTIAL INFORMATION    Dear Dr Beltrán    We are seeing Daisy Calderon, date of birth 1955, in the clinic at [place] [address] [facility]. Yana Pearce MD is the patient's PCP. Daisy Calderon has an outstanding lab/procedure at the time we reviewed her chart. In order to help keep her health information updated, she has authorized us to request the following medical record(s):        (  )  MAMMOGRAM                                      (  )  COLONOSCOPY      (  )  PAP SMEAR                                          (X)  OUTSIDE LAB RESULTS     (  )  DEXA SCAN                                          (  )  EYE EXAM            (  )  FOOT EXAM                                          (  )  ENTIRE RECORD     (  )  OUTSIDE IMMUNIZATIONS                 (  )  _______________         Please fax records to [facility], Reshma Arun Bhanushali, MD, [phone]            Patient Name: Daisy Calderon  : 1955  Patient Phone #: [phone]

## 2023-08-31 LAB
HBA1C MFR BLD: 6.7 % (ref 4–6)
HM FOOT EXAM: NORMAL

## 2023-11-14 ENCOUNTER — DOCUMENTATION ONLY (OUTPATIENT)
Dept: INTERNAL MEDICINE | Facility: CLINIC | Age: 68
End: 2023-11-14
Payer: MEDICARE

## 2023-11-20 ENCOUNTER — DOCUMENTATION ONLY (OUTPATIENT)
Dept: ADMINISTRATIVE | Facility: HOSPITAL | Age: 68
End: 2023-11-20
Payer: MEDICARE

## 2023-11-20 VITALS — DIASTOLIC BLOOD PRESSURE: 68 MMHG | SYSTOLIC BLOOD PRESSURE: 137 MMHG

## 2023-11-20 DIAGNOSIS — E10.9 TYPE 1 DIABETES MELLITUS WITHOUT COMPLICATION: ICD-10-CM

## 2023-11-20 DIAGNOSIS — Z12.31 BREAST CANCER SCREENING BY MAMMOGRAM: ICD-10-CM

## 2023-11-20 DIAGNOSIS — E03.9 ACQUIRED HYPOTHYROIDISM: ICD-10-CM

## 2023-11-20 DIAGNOSIS — I15.2 HYPERTENSION DUE TO ENDOCRINE DISORDER: Primary | ICD-10-CM

## 2023-12-13 ENCOUNTER — HOSPITAL ENCOUNTER (OUTPATIENT)
Dept: RADIOLOGY | Facility: HOSPITAL | Age: 68
Discharge: HOME | End: 2023-12-13
Attending: INTERNAL MEDICINE
Payer: MEDICARE

## 2023-12-13 DIAGNOSIS — Z12.31 BREAST CANCER SCREENING BY MAMMOGRAM: ICD-10-CM

## 2023-12-13 PROCEDURE — 77067 MAMMO DIGITAL SCREENING BILAT WITH TOMO: ICD-10-PCS | Mod: 26,,, | Performed by: STUDENT IN AN ORGANIZED HEALTH CARE EDUCATION/TRAINING PROGRAM

## 2023-12-13 PROCEDURE — 77063 BREAST TOMOSYNTHESIS BI: CPT | Mod: 26,,, | Performed by: STUDENT IN AN ORGANIZED HEALTH CARE EDUCATION/TRAINING PROGRAM

## 2023-12-13 PROCEDURE — 77067 SCR MAMMO BI INCL CAD: CPT | Mod: 26,,, | Performed by: STUDENT IN AN ORGANIZED HEALTH CARE EDUCATION/TRAINING PROGRAM

## 2023-12-13 PROCEDURE — 77067 SCR MAMMO BI INCL CAD: CPT | Mod: TC

## 2023-12-13 PROCEDURE — 77063 MAMMO DIGITAL SCREENING BILAT WITH TOMO: ICD-10-PCS | Mod: 26,,, | Performed by: STUDENT IN AN ORGANIZED HEALTH CARE EDUCATION/TRAINING PROGRAM

## 2023-12-18 ENCOUNTER — TELEPHONE (OUTPATIENT)
Dept: INTERNAL MEDICINE | Facility: CLINIC | Age: 68
End: 2023-12-18
Payer: MEDICARE

## 2023-12-18 NOTE — TELEPHONE ENCOUNTER
----- Message from Tammy Godoy sent at 12/18/2023 10:54 AM CST -----  Regarding: test results  Type:  Test Results    Who Called: pt  Name of Test (Lab/Mammo/Etc): mammo  Date of Test: 12/20  Ordering Provider: dr espinoza  [facility] test [facility]  Would the patient rather a call back or a response via [facility]? C/b  Best Call Back Number: [phone]    Additional Information:  orig mammo done 12/13, pt was then called and was told radiologist noticed calcification and needed another test done and pt was scheduled for 12/20    Please contact pt to discuss why, test results and need for another test

## 2023-12-20 ENCOUNTER — HOSPITAL ENCOUNTER (OUTPATIENT)
Dept: RADIOLOGY | Facility: HOSPITAL | Age: 68
Discharge: HOME | End: 2023-12-20
Attending: INTERNAL MEDICINE
Payer: MEDICARE

## 2023-12-20 DIAGNOSIS — R92.1 BREAST CALCIFICATIONS: ICD-10-CM

## 2023-12-20 PROCEDURE — 77061 MAMMO DIGITAL DIAGNOSTIC RIGHT WITH TOMO: ICD-10-PCS | Mod: 26,RT,, | Performed by: RADIOLOGY

## 2023-12-20 PROCEDURE — 77065 DX MAMMO INCL CAD UNI: CPT | Mod: TC,RT

## 2023-12-20 PROCEDURE — 77065 MAMMO DIGITAL DIAGNOSTIC RIGHT WITH TOMO: ICD-10-PCS | Mod: 26,RT,, | Performed by: RADIOLOGY

## 2023-12-20 PROCEDURE — 77065 DX MAMMO INCL CAD UNI: CPT | Mod: 26,RT,, | Performed by: RADIOLOGY

## 2023-12-20 PROCEDURE — 77061 BREAST TOMOSYNTHESIS UNI: CPT | Mod: 26,RT,, | Performed by: RADIOLOGY

## 2023-12-22 NOTE — TELEPHONE ENCOUNTER
Called patient and she wanted to let you know she was scheduled for a Biopsy for 2 spots on January 20th

## 2024-01-10 ENCOUNTER — TELEPHONE (OUTPATIENT)
Dept: INTERNAL MEDICINE | Facility: CLINIC | Age: 69
End: 2024-01-10
Payer: MEDICARE

## 2024-01-10 DIAGNOSIS — Z78.9 PATIENT REQUESTS ALTERNATE TREATMENT: Primary | ICD-10-CM

## 2024-01-10 RX ORDER — AMOXICILLIN AND CLAVULANATE POTASSIUM 500; 125 MG/1; MG/1
1 TABLET, FILM COATED ORAL 2 TIMES DAILY
Qty: 14 TABLET | Refills: 0 | Status: ON HOLD | OUTPATIENT
Start: 2024-01-10 | End: 2024-01-19 | Stop reason: HOSPADM

## 2024-01-10 NOTE — TELEPHONE ENCOUNTER
Lupe called in per Dr. TANG and patient request. She was told to only take if she develops redness/swelling after biopsy. Dr TANG felt like she did not need them but patient was insistent and they were called in in case she needs. Patient was made aware of side effects of medication including diarrhea. Patient verbalized understanding.

## 2024-01-10 NOTE — TELEPHONE ENCOUNTER
----- Message from Doroteo Damon sent at 1/9/2024  4:35 PM CST -----  .Type:  Needs Medical Advice    Who Called: pt  Symptoms (please be specific):    How long has patient had these symptoms:    Pharmacy name and phone #:    Would the patient rather a call back or a response via [facility]? Call back  Best Call Back Number: [phone]  Additional Information: pt is calling to discuss biopsy and is requesting antibiotics for surgery

## 2024-01-17 ENCOUNTER — HOSPITAL ENCOUNTER (INPATIENT)
Facility: HOSPITAL | Age: 69
LOS: 2 days | Discharge: HOME | DRG: 638 | End: 2024-01-19
Attending: EMERGENCY MEDICINE | Admitting: INTERNAL MEDICINE
Payer: MEDICARE

## 2024-01-17 DIAGNOSIS — R19.7 DIARRHEA, UNSPECIFIED TYPE: ICD-10-CM

## 2024-01-17 DIAGNOSIS — R73.9 HYPERGLYCEMIA: ICD-10-CM

## 2024-01-17 DIAGNOSIS — E10.10 DIABETIC KETOACIDOSIS WITHOUT COMA ASSOCIATED WITH TYPE 1 DIABETES MELLITUS: ICD-10-CM

## 2024-01-17 DIAGNOSIS — R11.2 NAUSEA AND VOMITING, UNSPECIFIED VOMITING TYPE: Primary | ICD-10-CM

## 2024-01-17 DIAGNOSIS — D72.829 LEUKOCYTOSIS, UNSPECIFIED TYPE: ICD-10-CM

## 2024-01-17 LAB
ALBUMIN SERPL-MCNC: 3.9 G/DL (ref 3.4–4.8)
ALBUMIN/GLOB SERPL: 1.3 RATIO (ref 1.1–2)
ALLENS TEST BLOOD GAS (OHS): ABNORMAL
ALP SERPL-CCNC: 81 UNIT/L (ref 40–150)
ALT SERPL-CCNC: 21 UNIT/L (ref 0–55)
APPEARANCE UR: CLEAR
AST SERPL-CCNC: 25 UNIT/L (ref 5–34)
B-OH-BUTYR SERPL-MCNC: 11.9 MMOL/L
BACTERIA #/AREA URNS AUTO: ABNORMAL /HPF
BASE EXCESS BLD CALC-SCNC: -22.3 MMOL/L
BASOPHILS # BLD AUTO: 0.08 X10(3)/MCL
BASOPHILS NFR BLD AUTO: 0.4 %
BILIRUB SERPL-MCNC: 0.6 MG/DL
BILIRUB UR QL STRIP.AUTO: NEGATIVE
BLOOD GAS SAMPLE TYPE (OHS): ABNORMAL
BUN SERPL-MCNC: 27.5 MG/DL (ref 9.8–20.1)
CA-I BLD-SCNC: 1.25 MMOL/L (ref 1.12–1.23)
CALCIUM SERPL-MCNC: 10.1 MG/DL (ref 8.4–10.2)
CHLORIDE SERPL-SCNC: 98 MMOL/L (ref 98–107)
CO2 BLDA-SCNC: 10.2 MMOL/L
CO2 SERPL-SCNC: 11 MMOL/L (ref 23–31)
COHGB MFR BLDA: 1.5 %
COLOR UR AUTO: ABNORMAL
CREAT SERPL-MCNC: 1.23 MG/DL (ref 0.55–1.02)
DRAWN BY BLOOD GAS (OHS): ABNORMAL
EOSINOPHIL # BLD AUTO: 0.05 X10(3)/MCL (ref 0–0.9)
EOSINOPHIL NFR BLD AUTO: 0.2 %
ERYTHROCYTE [DISTWIDTH] IN BLOOD BY AUTOMATED COUNT: 12 % (ref 11.5–17)
GFR SERPLBLD CREATININE-BSD FMLA CKD-EPI: 48 MLS/MIN/1.73/M2
GLOBULIN SER-MCNC: 3 GM/DL (ref 2.4–3.5)
GLUCOSE SERPL-MCNC: 579 MG/DL (ref 82–115)
GLUCOSE UR QL STRIP.AUTO: ABNORMAL
HCO3 BLDA-SCNC: 9 MMOL/L
HCT VFR BLD AUTO: 41.7 % (ref 37–47)
HGB BLD-MCNC: 13.3 G/DL (ref 12–16)
HYALINE CASTS #/AREA URNS LPF: ABNORMAL /LPF
IMM GRANULOCYTES # BLD AUTO: 0.24 X10(3)/MCL (ref 0–0.04)
IMM GRANULOCYTES NFR BLD AUTO: 1.2 %
INHALED O2 CONCENTRATION: 21 %
KETONES UR QL STRIP.AUTO: ABNORMAL
LACTATE SERPL-SCNC: 1.6 MMOL/L (ref 0.5–2.2)
LACTATE SERPL-SCNC: 2.5 MMOL/L (ref 0.5–2.2)
LEUKOCYTE ESTERASE UR QL STRIP.AUTO: NEGATIVE
LIPASE SERPL-CCNC: 14 U/L
LYMPHOCYTES # BLD AUTO: 1.01 X10(3)/MCL (ref 0.6–4.6)
LYMPHOCYTES NFR BLD AUTO: 5 %
MAGNESIUM SERPL-MCNC: 2 MG/DL (ref 1.6–2.6)
MCH RBC QN AUTO: 34.8 PG (ref 27–31)
MCHC RBC AUTO-ENTMCNC: 31.9 G/DL (ref 33–36)
MCV RBC AUTO: 109.2 FL (ref 80–94)
METHGB MFR BLDA: 0.7 %
MONOCYTES # BLD AUTO: 1.28 X10(3)/MCL (ref 0.1–1.3)
MONOCYTES NFR BLD AUTO: 6.3 %
MUCOUS THREADS URNS QL MICRO: ABNORMAL /LPF
NEUTROPHILS # BLD AUTO: 17.59 X10(3)/MCL (ref 2.1–9.2)
NEUTROPHILS NFR BLD AUTO: 86.9 %
NITRITE UR QL STRIP.AUTO: NEGATIVE
NRBC BLD AUTO-RTO: 0 %
O2 HB BLOOD GAS (OHS): 61.4 %
OXYHGB MFR BLDA: 14.6 G/DL
PCO2 BLDA: 39 MMHG (ref 20–50)
PH BLDA: 6.97 [PH] (ref 7.3–7.6)
PH UR STRIP.AUTO: 5 [PH]
PLATELET # BLD AUTO: 302 X10(3)/MCL (ref 130–400)
PMV BLD AUTO: 9.9 FL (ref 7.4–10.4)
PO2 BLDA: 39 MMHG
POCT GLUCOSE: 168 MG/DL (ref 70–110)
POCT GLUCOSE: 181 MG/DL (ref 70–110)
POCT GLUCOSE: 289 MG/DL (ref 70–110)
POCT GLUCOSE: 355 MG/DL (ref 70–110)
POTASSIUM BLOOD GAS (OHS): 5.4 MMOL/L (ref 3.5–5)
POTASSIUM SERPL-SCNC: 5.1 MMOL/L (ref 3.5–5.1)
PROT SERPL-MCNC: 6.9 GM/DL (ref 5.8–7.6)
PROT UR QL STRIP.AUTO: ABNORMAL
RBC # BLD AUTO: 3.82 X10(6)/MCL (ref 4.2–5.4)
RBC #/AREA URNS AUTO: ABNORMAL /HPF
RBC UR QL AUTO: NEGATIVE
SAO2 % BLDA: 40.9 %
SODIUM BLOOD GAS (OHS): 129 MMOL/L (ref 137–145)
SODIUM SERPL-SCNC: 137 MMOL/L (ref 136–145)
SP GR UR STRIP.AUTO: 1.01 (ref 1–1.03)
SQUAMOUS #/AREA URNS LPF: ABNORMAL /HPF
TROPONIN I SERPL-MCNC: <0.01 NG/ML (ref 0–0.04)
UROBILINOGEN UR STRIP-ACNC: NORMAL
WBC # SPEC AUTO: 20.25 X10(3)/MCL (ref 4.5–11.5)
WBC #/AREA URNS AUTO: ABNORMAL /HPF

## 2024-01-17 PROCEDURE — 80053 COMPREHEN METABOLIC PANEL: CPT | Performed by: EMERGENCY MEDICINE

## 2024-01-17 PROCEDURE — 83605 ASSAY OF LACTIC ACID: CPT | Performed by: INTERNAL MEDICINE

## 2024-01-17 PROCEDURE — 82803 BLOOD GASES ANY COMBINATION: CPT

## 2024-01-17 PROCEDURE — 20000000 HC ICU ROOM

## 2024-01-17 PROCEDURE — 87040 BLOOD CULTURE FOR BACTERIA: CPT | Performed by: INTERNAL MEDICINE

## 2024-01-17 PROCEDURE — 25000003 PHARM REV CODE 250

## 2024-01-17 PROCEDURE — 25000003 PHARM REV CODE 250: Performed by: EMERGENCY MEDICINE

## 2024-01-17 PROCEDURE — 93005 ELECTROCARDIOGRAM TRACING: CPT

## 2024-01-17 PROCEDURE — 96361 HYDRATE IV INFUSION ADD-ON: CPT

## 2024-01-17 PROCEDURE — 96374 THER/PROPH/DIAG INJ IV PUSH: CPT

## 2024-01-17 PROCEDURE — 85025 COMPLETE CBC W/AUTO DIFF WBC: CPT | Performed by: EMERGENCY MEDICINE

## 2024-01-17 PROCEDURE — 81001 URINALYSIS AUTO W/SCOPE: CPT | Performed by: EMERGENCY MEDICINE

## 2024-01-17 PROCEDURE — 63600175 PHARM REV CODE 636 W HCPCS: Performed by: EMERGENCY MEDICINE

## 2024-01-17 PROCEDURE — 83690 ASSAY OF LIPASE: CPT | Performed by: EMERGENCY MEDICINE

## 2024-01-17 PROCEDURE — 63600175 PHARM REV CODE 636 W HCPCS: Performed by: INTERNAL MEDICINE

## 2024-01-17 PROCEDURE — 84484 ASSAY OF TROPONIN QUANT: CPT | Performed by: EMERGENCY MEDICINE

## 2024-01-17 PROCEDURE — 82010 KETONE BODYS QUAN: CPT | Performed by: EMERGENCY MEDICINE

## 2024-01-17 PROCEDURE — 99285 EMERGENCY DEPT VISIT HI MDM: CPT | Mod: 25

## 2024-01-17 PROCEDURE — 87040 BLOOD CULTURE FOR BACTERIA: CPT | Performed by: EMERGENCY MEDICINE

## 2024-01-17 PROCEDURE — 93010 ELECTROCARDIOGRAM REPORT: CPT | Mod: ,,, | Performed by: INTERNAL MEDICINE

## 2024-01-17 PROCEDURE — 63600175 PHARM REV CODE 636 W HCPCS

## 2024-01-17 PROCEDURE — 99900035 HC TECH TIME PER 15 MIN (STAT)

## 2024-01-17 PROCEDURE — 83735 ASSAY OF MAGNESIUM: CPT | Performed by: EMERGENCY MEDICINE

## 2024-01-17 RX ORDER — NOREPINEPHRINE BITARTRATE/D5W 8 MG/250ML
0-3 PLASTIC BAG, INJECTION (ML) INTRAVENOUS CONTINUOUS
Status: DISCONTINUED | OUTPATIENT
Start: 2024-01-17 | End: 2024-01-19

## 2024-01-17 RX ORDER — DEXTROSE MONOHYDRATE 100 MG/ML
INJECTION, SOLUTION INTRAVENOUS
Status: DISCONTINUED | OUTPATIENT
Start: 2024-01-17 | End: 2024-01-19 | Stop reason: HOSPADM

## 2024-01-17 RX ORDER — POTASSIUM CHLORIDE 7.45 MG/ML
40 INJECTION INTRAVENOUS
Status: DISCONTINUED | OUTPATIENT
Start: 2024-01-17 | End: 2024-01-17

## 2024-01-17 RX ORDER — MUPIROCIN 20 MG/G
OINTMENT TOPICAL 2 TIMES DAILY
Status: DISCONTINUED | OUTPATIENT
Start: 2024-01-17 | End: 2024-01-19 | Stop reason: HOSPADM

## 2024-01-17 RX ORDER — DEXTROSE MONOHYDRATE AND SODIUM CHLORIDE 5; .45 G/100ML; G/100ML
INJECTION, SOLUTION INTRAVENOUS CONTINUOUS PRN
Status: DISCONTINUED | OUTPATIENT
Start: 2024-01-17 | End: 2024-01-19 | Stop reason: HOSPADM

## 2024-01-17 RX ORDER — DEXTROSE MONOHYDRATE AND SODIUM CHLORIDE 5; .45 G/100ML; G/100ML
INJECTION, SOLUTION INTRAVENOUS CONTINUOUS
Status: DISCONTINUED | OUTPATIENT
Start: 2024-01-17 | End: 2024-01-19 | Stop reason: HOSPADM

## 2024-01-17 RX ORDER — SODIUM CHLORIDE AND POTASSIUM CHLORIDE 150; 900 MG/100ML; MG/100ML
INJECTION, SOLUTION INTRAVENOUS CONTINUOUS
Status: DISCONTINUED | OUTPATIENT
Start: 2024-01-17 | End: 2024-01-19 | Stop reason: HOSPADM

## 2024-01-17 RX ORDER — ONDANSETRON HYDROCHLORIDE 2 MG/ML
INJECTION, SOLUTION INTRAVENOUS
Status: COMPLETED
Start: 2024-01-17 | End: 2024-01-17

## 2024-01-17 RX ORDER — SODIUM CHLORIDE 9 MG/ML
1000 INJECTION, SOLUTION INTRAVENOUS CONTINUOUS
Status: ACTIVE | OUTPATIENT
Start: 2024-01-17 | End: 2024-01-18

## 2024-01-17 RX ORDER — DEXTROSE MONOHYDRATE, SODIUM CHLORIDE, AND POTASSIUM CHLORIDE 50; 1.49; 4.5 G/1000ML; G/1000ML; G/1000ML
INJECTION, SOLUTION INTRAVENOUS CONTINUOUS
Status: DISCONTINUED | OUTPATIENT
Start: 2024-01-17 | End: 2024-01-19 | Stop reason: HOSPADM

## 2024-01-17 RX ORDER — SODIUM CHLORIDE 0.9 % (FLUSH) 0.9 %
10 SYRINGE (ML) INJECTION
Status: CANCELLED | OUTPATIENT
Start: 2024-01-17

## 2024-01-17 RX ORDER — POTASSIUM CHLORIDE 7.45 MG/ML
80 INJECTION INTRAVENOUS
Status: DISCONTINUED | OUTPATIENT
Start: 2024-01-17 | End: 2024-01-17

## 2024-01-17 RX ORDER — SODIUM CHLORIDE 450 MG/100ML
INJECTION, SOLUTION INTRAVENOUS CONTINUOUS
Status: DISCONTINUED | OUTPATIENT
Start: 2024-01-17 | End: 2024-01-19 | Stop reason: HOSPADM

## 2024-01-17 RX ORDER — LEVOTHYROXINE SODIUM 150 UG/1
150 TABLET ORAL
Status: CANCELLED | OUTPATIENT
Start: 2024-01-18

## 2024-01-17 RX ORDER — FAMOTIDINE 10 MG/ML
20 INJECTION INTRAVENOUS DAILY
Status: DISCONTINUED | OUTPATIENT
Start: 2024-01-18 | End: 2024-01-19 | Stop reason: HOSPADM

## 2024-01-17 RX ORDER — SODIUM CHLORIDE 9 MG/ML
INJECTION, SOLUTION INTRAVENOUS CONTINUOUS
Status: DISCONTINUED | OUTPATIENT
Start: 2024-01-17 | End: 2024-01-19 | Stop reason: HOSPADM

## 2024-01-17 RX ORDER — METOCLOPRAMIDE HYDROCHLORIDE 5 MG/ML
10 INJECTION INTRAMUSCULAR; INTRAVENOUS
Status: CANCELLED | OUTPATIENT
Start: 2024-01-17 | End: 2024-01-17

## 2024-01-17 RX ORDER — ONDANSETRON HYDROCHLORIDE 2 MG/ML
4 INJECTION, SOLUTION INTRAVENOUS ONCE
Status: COMPLETED | OUTPATIENT
Start: 2024-01-17 | End: 2024-01-17

## 2024-01-17 RX ORDER — POTASSIUM CHLORIDE 7.45 MG/ML
60 INJECTION INTRAVENOUS
Status: DISCONTINUED | OUTPATIENT
Start: 2024-01-17 | End: 2024-01-17

## 2024-01-17 RX ORDER — SODIUM CHLORIDE 0.9 % (FLUSH) 0.9 %
10 SYRINGE (ML) INJECTION
Status: DISCONTINUED | OUTPATIENT
Start: 2024-01-17 | End: 2024-01-19 | Stop reason: HOSPADM

## 2024-01-17 RX ORDER — MAGNESIUM SULFATE HEPTAHYDRATE 40 MG/ML
2 INJECTION, SOLUTION INTRAVENOUS
Status: DISCONTINUED | OUTPATIENT
Start: 2024-01-17 | End: 2024-01-19 | Stop reason: HOSPADM

## 2024-01-17 RX ORDER — MAGNESIUM SULFATE HEPTAHYDRATE 40 MG/ML
2 INJECTION, SOLUTION INTRAVENOUS ONCE
Status: COMPLETED | OUTPATIENT
Start: 2024-01-17 | End: 2024-01-18

## 2024-01-17 RX ORDER — POTASSIUM CHLORIDE 14.9 MG/ML
20 INJECTION INTRAVENOUS
Status: DISCONTINUED | OUTPATIENT
Start: 2024-01-17 | End: 2024-01-19 | Stop reason: HOSPADM

## 2024-01-17 RX ORDER — METOCLOPRAMIDE HYDROCHLORIDE 5 MG/ML
10 INJECTION INTRAMUSCULAR; INTRAVENOUS
Status: COMPLETED | OUTPATIENT
Start: 2024-01-17 | End: 2024-01-17

## 2024-01-17 RX ORDER — ENOXAPARIN SODIUM 100 MG/ML
40 INJECTION SUBCUTANEOUS EVERY 24 HOURS
Status: DISCONTINUED | OUTPATIENT
Start: 2024-01-18 | End: 2024-01-19 | Stop reason: HOSPADM

## 2024-01-17 RX ADMIN — SODIUM CHLORIDE 1000 ML: 9 INJECTION, SOLUTION INTRAVENOUS at 06:01

## 2024-01-17 RX ADMIN — SODIUM CHLORIDE, POTASSIUM CHLORIDE, SODIUM LACTATE AND CALCIUM CHLORIDE 1000 ML: 600; 310; 30; 20 INJECTION, SOLUTION INTRAVENOUS at 07:01

## 2024-01-17 RX ADMIN — SODIUM CHLORIDE, POTASSIUM CHLORIDE, SODIUM LACTATE AND CALCIUM CHLORIDE 1000 ML: 600; 310; 30; 20 INJECTION, SOLUTION INTRAVENOUS at 04:01

## 2024-01-17 RX ADMIN — METOCLOPRAMIDE 10 MG: 5 INJECTION, SOLUTION INTRAMUSCULAR; INTRAVENOUS at 06:01

## 2024-01-17 RX ADMIN — MAGNESIUM SULFATE HEPTAHYDRATE 2 G: 40 INJECTION, SOLUTION INTRAVENOUS at 10:01

## 2024-01-17 RX ADMIN — SODIUM CHLORIDE, POTASSIUM CHLORIDE, SODIUM LACTATE AND CALCIUM CHLORIDE 1000 ML: 600; 310; 30; 20 INJECTION, SOLUTION INTRAVENOUS at 05:01

## 2024-01-17 RX ADMIN — ONDANSETRON 4 MG: 2 INJECTION INTRAMUSCULAR; INTRAVENOUS at 05:01

## 2024-01-17 RX ADMIN — ONDANSETRON HYDROCHLORIDE 4 MG: 2 INJECTION, SOLUTION INTRAVENOUS at 05:01

## 2024-01-17 RX ADMIN — INSULIN HUMAN 0.1 UNITS/KG/HR: 1 INJECTION, SOLUTION INTRAVENOUS at 06:01

## 2024-01-17 RX ADMIN — POTASSIUM CHLORIDE, DEXTROSE MONOHYDRATE AND SODIUM CHLORIDE: 150; 5; 450 INJECTION, SOLUTION INTRAVENOUS at 10:01

## 2024-01-17 RX ADMIN — NOREPINEPHRINE BITARTRATE 0.02 MCG/KG/MIN: 8 INJECTION, SOLUTION INTRAVENOUS at 10:01

## 2024-01-17 NOTE — Clinical Note
Diagnosis: Hyperglycemia [365740]   Future Attending Provider: ONOFRE BLACKMAN [[id number]]   Admit to which facility:: [facility] [[id number]]   Reason for IP Medical Treatment  (Clinical interventions that can only be accomplished in the IP setting? ) :: dka   I certify that Inpatient services for greater than or equal to 2 midnights are medically necessary:: Yes   Plans for Post-Acute care--if anticipated (pick the single best option):: A. No post acute care anticipated at this time

## 2024-01-17 NOTE — ED PROVIDER NOTES
Encounter Date: 2024    SCRIBE #1 NOTE: I, Kobe Kruger, am scribing for, and in the presence of,  Adelita Santana MD. I have scribed the following portions of the note - Other sections scribed: HPI,ROS,PE.       History     Chief Complaint   Patient presents with    Emesis     Pt. C/o nausea, vomiting, and diarrhea , pmh DM      69 y/o female with PMHx of type 1 DM, HTN, Hypothyroidism, and HLD presents to ED c/o nausea and vomiting onset ~1030 today. Pt also complains of generalized weakness. Pt reports her CBG was over 400 for the past 2x days. She reports her dexcom isn't working. She reports giving herself insulin as needed for the past 2x days. She reports it feels similar to previous DKA. She reports having normal BM and urine today. She denies any other complaints.     The history is provided by the patient. No  was used.     Review of patient's allergies indicates:   Allergen Reactions    Levofloxacin      Other reaction(s): itching, swelling    Oxycodone-aspirin     Brimonidine Nausea And Vomiting     Past Medical History:   Diagnosis Date    Diabetes type I     HTN (hypertension)     Hypothyroidism, unspecified     Tobacco user      Past Surgical History:   Procedure Laterality Date    CARPAL TUNNEL RELEASE      CATARACT EXTRACTION       SECTION      CHOLECYSTECTOMY      TOTAL ABDOMINAL HYSTERECTOMY      TUBAL LIGATION       No family history on file.  Social History     Tobacco Use    Smoking status: Every Day     Current packs/day: 0.25     Types: Cigarettes    Smokeless tobacco: Never   Substance Use Topics    Alcohol use: Yes     Alcohol/week: 6.0 standard drinks of alcohol     Types: 6 Cans of beer per week     Review of Systems   Constitutional:  Negative for chills, diaphoresis and fever.   HENT:  Negative for congestion, ear pain, sinus pain and sore throat.    Eyes:  Negative for pain, discharge and visual disturbance.   Respiratory:  Negative for  cough, shortness of breath, wheezing and stridor.    Cardiovascular:  Negative for chest pain and palpitations.   Gastrointestinal:  Positive for nausea and vomiting. Negative for abdominal pain, constipation, diarrhea and rectal pain.   Genitourinary:  Negative for dysuria and hematuria.   Musculoskeletal:  Negative for back pain and myalgias.   Skin:  Negative for rash.   Neurological:  Positive for weakness (generalized). Negative for dizziness, syncope, numbness and headaches.   Hematological: Negative.    Psychiatric/Behavioral: Negative.     All other systems reviewed and are negative.      Physical Exam     Initial Vitals [01/17/24 1637]   BP Pulse Resp Temp SpO2   (!) 134/53 (!) 125 (!) 22 98 °F (36.7 °C) 98 %      MAP       --         Physical Exam    Nursing note and vitals reviewed.  Constitutional: She appears well-developed. She is not diaphoretic. No distress.   Generally weak.    HENT:   Head: Normocephalic and atraumatic.   Nose: Nose normal.   Mouth/Throat: Oropharynx is clear and moist. Mucous membranes are dry.   Eyes: Conjunctivae and EOM are normal. Pupils are equal, round, and reactive to light.   Neck: Trachea normal. Neck supple.   Normal range of motion.  Cardiovascular:  Regular rhythm, normal heart sounds and intact distal pulses.   Tachycardia present.         No murmur heard.  Pulmonary/Chest: Breath sounds normal. No respiratory distress. She has no wheezes. She has no rhonchi. She has no rales. She exhibits no tenderness.   Abdominal: Abdomen is soft. Bowel sounds are normal. She exhibits no distension and no mass. There is no abdominal tenderness. There is no rebound and no guarding.   Musculoskeletal:         General: No tenderness or edema. Normal range of motion.      Cervical back: Normal range of motion and neck supple.      Lumbar back: Normal. Normal range of motion.     Neurological: She is alert and oriented to person, place, and time. She has normal strength. No cranial  nerve deficit or sensory deficit. GCS score is 15. GCS eye subscore is 4. GCS verbal subscore is 5. GCS motor subscore is 6.   Skin: Skin is warm and dry. Capillary refill takes less than 2 seconds.   Psychiatric: She has a normal mood and affect. Her behavior is normal. Judgment and thought content normal.         ED Course   Critical Care    Date/Time: 1/17/2024 6:29 PM    Performed by: Adelita Santana MD  Authorized by: Adelita Santana MD  Direct patient critical care time: 45 minutes  Total critical care time (exclusive of procedural time) : 45 minutes  Critical care was necessary to treat or prevent imminent or life-threatening deterioration of the following conditions: metabolic crisis and dehydration.  Critical care was time spent personally by me on the following activities: development of treatment plan with patient or surrogate, discussions with consultants, evaluation of patient's response to treatment, examination of patient, obtaining history from patient or surrogate, ordering and performing treatments and interventions, ordering and review of laboratory studies, pulse oximetry, re-evaluation of patient's condition and review of old charts.        Labs Reviewed   BETA - HYDROXYBUTYRATE, SERUM - Abnormal; Notable for the following components:       Result Value    Beta Hydroxybutyrate 11.90 (*)     All other components within normal limits   COMPREHENSIVE METABOLIC PANEL - Abnormal; Notable for the following components:    Carbon Dioxide 11 (*)     Glucose Level 579 (*)     Blood Urea Nitrogen 27.5 (*)     Creatinine 1.23 (*)     All other components within normal limits   CBC WITH DIFFERENTIAL - Abnormal; Notable for the following components:    WBC 20.25 (*)     RBC 3.82 (*)     .2 (*)     MCH 34.8 (*)     MCHC 31.9 (*)     Neut # 17.59 (*)     IG# 0.24 (*)     All other components within normal limits   BLOOD GAS - Abnormal; Notable for the following components:    pH, Blood gas 6.970  (*)     Sodium, Blood Gas 129 (*)     Potassium, Blood Gas 5.4 (*)     Calcium Level Ionized 1.25 (*)     All other components within normal limits   LIPASE - Normal   MAGNESIUM - Normal   TROPONIN I - Normal   BLOOD CULTURE [facility]   BLOOD CULTURE [facility]   CBC W/ AUTO DIFFERENTIAL    Narrative:     The following orders were created for panel order CBC auto differential.  Procedure                               Abnormality         Status                     ---------                               -----------         ------                     CBC with Differential[[id number]]       Abnormal            Final result                 Please view results for these tests on the individual orders.   URINALYSIS, REFLEX TO URINE CULTURE   POCT GLUCOSE MONITORING CONTINUOUS          Imaging Results    None          Medications   sodium chloride 0.9% flush 10 mL (has no administration in time range)   0.9%  NaCl infusion (1,000 mLs Intravenous New Bag 1/17/24 1815)   dextrose 5 % and 0.45 % NaCl infusion (has no administration in time range)   dextrose 10 % infusion (has no administration in time range)   dextrose 10 % infusion (has no administration in time range)   insulin regular in 0.9 % NaCl 100 unit/100 mL (1 unit/mL) infusion (0.1 Units/kg/hr × 47.6 kg (Dosing Weight) Intravenous New Bag 1/17/24 1808)   potassium chloride 10 mEq in 100 mL IVPB (has no administration in time range)     And   potassium chloride 10 mEq in 100 mL IVPB (has no administration in time range)     And   potassium chloride 10 mEq in 100 mL IVPB (has no administration in time range)   dextrose 10% bolus 125 mL 125 mL (has no administration in time range)   dextrose 10% bolus 250 mL 250 mL (has no administration in time range)   lactated ringers bolus 1,000 mL (0 mLs Intravenous Stopped 1/17/24 1718)   lactated ringers bolus 1,000 mL (1,000 mLs Intravenous New Bag 1/17/24 1709)   ondansetron injection 4 mg (4 mg Intravenous Given 1/17/24 1706)   insulin  regular bolus from bag/infusion 4.76 Units 4.76 mL (4.76 Units Intravenous Bolus from Bag 1/17/24 1805)   metoclopramide injection 10 mg (10 mg Intravenous Given 1/17/24 1845)             Scribe Attestation:   Scribe #1: I performed the above scribed service and the documentation accurately describes the services I performed. I attest to the accuracy of the note.  Comments: Attending:   Physician Attestation Statement for Scribe #1: Adelita QUEZADA MD, personally performed the services described in this documentation. All medical record entries made by the scribe were at my direction and in my presence.  I have reviewed the chart and agree that the record reflects my personal performance and is accurate and complete.        Attending Attestation:           Physician Attestation for Scribe:  Physician Attestation Statement for Scribe #1: Esther QUEZADA Brooke R, MD, reviewed documentation, as scribed by Kobe Kruger in my presence, and it is both accurate and complete.         Medical Decision Making  The differential diagnosis includes, but is not limited to, DKA and dehydration. Hyperglycemia, pancreatitis, uti  Cbc, cmp, acetone, cbg, cultures, EKG ordered and reviewed  Metabolic acidosis with elevated anion gap and acetone with leukocytosis (likely demargination due tos tress) due to dka  Given ivf and initiated on insulin infusion, admitted to icu  Suspect due to malfunction of cgm    Problems Addressed:  Diabetic ketoacidosis without coma associated with type 1 diabetes mellitus: acute illness or injury that poses a threat to life or bodily functions  Diarrhea, unspecified type: acute illness or injury that poses a threat to life or bodily functions  Hyperglycemia: acute illness or injury that poses a threat to life or bodily functions  Leukocytosis, unspecified type: acute illness or injury that poses a threat to life or bodily functions  Nausea and vomiting, unspecified vomiting type: acute illness or  injury that poses a threat to life or bodily functions    Amount and/or Complexity of Data Reviewed  Independent Historian: EMS  External Data Reviewed: notes.     Details: Pcp visits for dm1  Labs: ordered. Decision-making details documented in ED Course.  ECG/medicine tests: ordered and independent interpretation performed.    Risk  OTC drugs.  Prescription drug management.  Drug therapy requiring intensive monitoring for toxicity.  Decision regarding hospitalization.    Critical Care  Total time providing critical care: 45 minutes            ED Course as of 01/17/24 1912 Wed Jan 17, 2024   1741 VBG with pH 6.97 pCO2 39 PO2 39 sodium 129 potassium 5.4 calcium 1.25 [BS]   1741 Base deficit 22.3 [BS]   1828 EKG performed at 6:19 p.m. rate of 120 sinus tachycardia with inferior Q-waves [BS]   1828 Beta-Hydroxybutyrate(!): 11.90  Elevated as expected [BS]      ED Course User Index  [BS] Adelita Santana MD                           Clinical Impression:  Final diagnoses:  [R73.9] Hyperglycemia  [R11.2] Nausea and vomiting, unspecified vomiting type (Primary)  [R19.7] Diarrhea, unspecified type  [D72.829] Leukocytosis, unspecified type  [E10.10] Diabetic ketoacidosis without coma associated with type 1 diabetes mellitus          ED Disposition Condition    Admit Stable                Adelita Santana MD  01/17/24 1912

## 2024-01-18 LAB
ALBUMIN SERPL-MCNC: 3.2 G/DL (ref 3.4–4.8)
ALBUMIN/GLOB SERPL: 1.4 RATIO (ref 1.1–2)
ALP SERPL-CCNC: 61 UNIT/L (ref 40–150)
ALT SERPL-CCNC: 19 UNIT/L (ref 0–55)
ANION GAP SERPL CALC-SCNC: 16 MEQ/L
ANION GAP SERPL CALC-SCNC: 3 MEQ/L
ANION GAP SERPL CALC-SCNC: 6 MEQ/L
ANION GAP SERPL CALC-SCNC: 6 MEQ/L
AST SERPL-CCNC: 26 UNIT/L (ref 5–34)
BASOPHILS # BLD AUTO: 0.04 X10(3)/MCL
BASOPHILS NFR BLD AUTO: 0.2 %
BILIRUB SERPL-MCNC: 0.4 MG/DL
BUN SERPL-MCNC: 17.2 MG/DL (ref 9.8–20.1)
BUN SERPL-MCNC: 17.3 MG/DL (ref 9.8–20.1)
BUN SERPL-MCNC: 21.8 MG/DL (ref 9.8–20.1)
BUN SERPL-MCNC: 24.9 MG/DL (ref 9.8–20.1)
BUN SERPL-MCNC: 25.3 MG/DL (ref 9.8–20.1)
CALCIUM SERPL-MCNC: 8.3 MG/DL (ref 8.4–10.2)
CALCIUM SERPL-MCNC: 8.5 MG/DL (ref 8.4–10.2)
CALCIUM SERPL-MCNC: 8.7 MG/DL (ref 8.4–10.2)
CALCIUM SERPL-MCNC: 8.9 MG/DL (ref 8.4–10.2)
CALCIUM SERPL-MCNC: 8.9 MG/DL (ref 8.4–10.2)
CHLORIDE SERPL-SCNC: 107 MMOL/L (ref 98–107)
CHLORIDE SERPL-SCNC: 108 MMOL/L (ref 98–107)
CHLORIDE SERPL-SCNC: 108 MMOL/L (ref 98–107)
CHLORIDE SERPL-SCNC: 109 MMOL/L (ref 98–107)
CHLORIDE SERPL-SCNC: 110 MMOL/L (ref 98–107)
CO2 SERPL-SCNC: 14 MMOL/L (ref 23–31)
CO2 SERPL-SCNC: 14 MMOL/L (ref 23–31)
CO2 SERPL-SCNC: 20 MMOL/L (ref 23–31)
CO2 SERPL-SCNC: 20 MMOL/L (ref 23–31)
CO2 SERPL-SCNC: 24 MMOL/L (ref 23–31)
CREAT SERPL-MCNC: 0.74 MG/DL (ref 0.55–1.02)
CREAT SERPL-MCNC: 0.78 MG/DL (ref 0.55–1.02)
CREAT SERPL-MCNC: 0.84 MG/DL (ref 0.55–1.02)
CREAT SERPL-MCNC: 0.89 MG/DL (ref 0.55–1.02)
CREAT SERPL-MCNC: 0.9 MG/DL (ref 0.55–1.02)
CREAT/UREA NIT SERPL: 22
CREAT/UREA NIT SERPL: 23
CREAT/UREA NIT SERPL: 26
CREAT/UREA NIT SERPL: 28
EOSINOPHIL # BLD AUTO: 0.01 X10(3)/MCL (ref 0–0.9)
EOSINOPHIL NFR BLD AUTO: 0.1 %
ERYTHROCYTE [DISTWIDTH] IN BLOOD BY AUTOMATED COUNT: 12.1 % (ref 11.5–17)
GFR SERPLBLD CREATININE-BSD FMLA CKD-EPI: >60 MLS/MIN/1.73/M2
GLOBULIN SER-MCNC: 2.3 GM/DL (ref 2.4–3.5)
GLUCOSE SERPL-MCNC: 163 MG/DL (ref 82–115)
GLUCOSE SERPL-MCNC: 185 MG/DL (ref 82–115)
GLUCOSE SERPL-MCNC: 203 MG/DL (ref 82–115)
GLUCOSE SERPL-MCNC: 204 MG/DL (ref 82–115)
GLUCOSE SERPL-MCNC: 223 MG/DL (ref 82–115)
HCT VFR BLD AUTO: 33.2 % (ref 37–47)
HGB BLD-MCNC: 10.8 G/DL (ref 12–16)
IMM GRANULOCYTES # BLD AUTO: 0.18 X10(3)/MCL (ref 0–0.04)
IMM GRANULOCYTES NFR BLD AUTO: 1 %
LYMPHOCYTES # BLD AUTO: 1.26 X10(3)/MCL (ref 0.6–4.6)
LYMPHOCYTES NFR BLD AUTO: 6.9 %
MAGNESIUM SERPL-MCNC: 1.5 MG/DL (ref 1.6–2.6)
MCH RBC QN AUTO: 35.2 PG (ref 27–31)
MCHC RBC AUTO-ENTMCNC: 32.5 G/DL (ref 33–36)
MCV RBC AUTO: 108.1 FL (ref 80–94)
MONOCYTES # BLD AUTO: 0.85 X10(3)/MCL (ref 0.1–1.3)
MONOCYTES NFR BLD AUTO: 4.7 %
NEUTROPHILS # BLD AUTO: 15.81 X10(3)/MCL (ref 2.1–9.2)
NEUTROPHILS NFR BLD AUTO: 87.1 %
NRBC BLD AUTO-RTO: 0 %
PHOSPHATE SERPL-MCNC: 3 MG/DL (ref 2.3–4.7)
PLATELET # BLD AUTO: 234 X10(3)/MCL (ref 130–400)
PMV BLD AUTO: 9.6 FL (ref 7.4–10.4)
POCT GLUCOSE: 148 MG/DL (ref 70–110)
POCT GLUCOSE: 163 MG/DL (ref 70–110)
POCT GLUCOSE: 181 MG/DL (ref 70–110)
POCT GLUCOSE: 188 MG/DL (ref 70–110)
POCT GLUCOSE: 189 MG/DL (ref 70–110)
POCT GLUCOSE: 192 MG/DL (ref 70–110)
POCT GLUCOSE: 196 MG/DL (ref 70–110)
POTASSIUM SERPL-SCNC: 3.7 MMOL/L (ref 3.5–5.1)
POTASSIUM SERPL-SCNC: 3.9 MMOL/L (ref 3.5–5.1)
POTASSIUM SERPL-SCNC: 4.1 MMOL/L (ref 3.5–5.1)
POTASSIUM SERPL-SCNC: 4.2 MMOL/L (ref 3.5–5.1)
POTASSIUM SERPL-SCNC: 4.5 MMOL/L (ref 3.5–5.1)
PROT SERPL-MCNC: 5.5 GM/DL (ref 5.8–7.6)
RBC # BLD AUTO: 3.07 X10(6)/MCL (ref 4.2–5.4)
SODIUM SERPL-SCNC: 134 MMOL/L (ref 136–145)
SODIUM SERPL-SCNC: 135 MMOL/L (ref 136–145)
SODIUM SERPL-SCNC: 136 MMOL/L (ref 136–145)
SODIUM SERPL-SCNC: 138 MMOL/L (ref 136–145)
SODIUM SERPL-SCNC: 139 MMOL/L (ref 136–145)
WBC # SPEC AUTO: 18.15 X10(3)/MCL (ref 4.5–11.5)

## 2024-01-18 PROCEDURE — 83735 ASSAY OF MAGNESIUM: CPT | Performed by: INTERNAL MEDICINE

## 2024-01-18 PROCEDURE — 21400001 HC TELEMETRY ROOM

## 2024-01-18 PROCEDURE — 25000003 PHARM REV CODE 250: Performed by: INTERNAL MEDICINE

## 2024-01-18 PROCEDURE — 27000221 HC OXYGEN, UP TO 24 HOURS

## 2024-01-18 PROCEDURE — 80053 COMPREHEN METABOLIC PANEL: CPT | Performed by: INTERNAL MEDICINE

## 2024-01-18 PROCEDURE — 85025 COMPLETE CBC W/AUTO DIFF WBC: CPT | Performed by: INTERNAL MEDICINE

## 2024-01-18 PROCEDURE — 63600175 PHARM REV CODE 636 W HCPCS: Performed by: INTERNAL MEDICINE

## 2024-01-18 PROCEDURE — 84100 ASSAY OF PHOSPHORUS: CPT | Performed by: INTERNAL MEDICINE

## 2024-01-18 RX ADMIN — FAMOTIDINE 20 MG: 10 INJECTION, SOLUTION INTRAVENOUS at 09:01

## 2024-01-18 RX ADMIN — MUPIROCIN: 20 OINTMENT TOPICAL at 12:01

## 2024-01-18 RX ADMIN — POTASSIUM CHLORIDE, DEXTROSE MONOHYDRATE AND SODIUM CHLORIDE: 150; 5; 450 INJECTION, SOLUTION INTRAVENOUS at 05:01

## 2024-01-18 RX ADMIN — MUPIROCIN: 20 OINTMENT TOPICAL at 08:01

## 2024-01-18 NOTE — NURSING
Patient transported to ICU room by ER staff. AAOx4. Able to transfer self over to ICU bed with minimal assistance. Skin assessed with EL Elliott OC. Patient reports generalized fatigue but states she is feeling much better already. Glucose assessed with POC monitor, 289. IVF- NS @ 125 and Insulin @ 0.1 u/kg/hr infusing at time of transfer. Patient's daughter, Terry, escorted to room and family information sheet provided. Visiting hours reviewed with daughter. Patient requesting to keep her purse at bedside with her. Patient reports she sees Dr. Beltrán for her insulin management and PCP is Dr. Pearce. Oriented patient to call bell, bed controls, and television. Patient verbalized understanding.

## 2024-01-18 NOTE — PLAN OF CARE
01/18/24 1330   Discharge Assessment   Assessment Type Discharge Planning Assessment   Confirmed/corrected address, phone number and insurance Yes   Confirmed Demographics Correct on Facesheet   Source of Information patient   When was your last doctors appointment? 08/22/23   Communicated NAUN with patient/caregiver Date not available/Unable to determine   Reason For Admission DKA   People in Home alone   Facility Arrived From: home   Do you expect to return to your current living situation? Yes   Do you have help at home or someone to help you manage your care at home? Yes   Who are your caregiver(s) and their phone number(s)? receives help from daughter and granddaughter, Sommer Calderon [phone]   Prior to hospitilization cognitive status: Alert/Oriented   Current cognitive status: Alert/Oriented   Walking or Climbing Stairs Difficulty no   Dressing/Bathing Difficulty no   Equipment Currently Used at Home other (see comments)  (patient has insulin pump with monitor (dexcom))   Patient currently being followed by outpatient case management? No   Do you currently have service(s) that help you manage your care at home? No   Do you take prescription medications? Yes   Do you have prescription coverage? Yes   Coverage [employer]   Do you have any problems affording any of your prescribed medications? No   Is the patient taking medications as prescribed? yes   Who is going to help you get home at discharge? daughter   How do you get to doctors appointments? car, drives self   Are you on dialysis? No   Do you take coumadin? No   Discharge Plan A Home;Home with family   Discharge Plan B Home with family;Home   DME Needed Upon Discharge  none   Discharge Plan discussed with: Patient   Transition of Care Barriers None   Housing Stability   In the last 12 months, was there a time when you were not able to pay the mortgage or rent on time? N   In the last 12 months, how many places have you lived? 1   In the last 12  months, was there a time when you did not have a steady place to sleep or slept in a shelter (including now)? N   Transportation Needs   In the past 12 months, has lack of transportation kept you from medical appointments or from getting medications? no   In the past 12 months, has lack of transportation kept you from meetings, work, or from getting things needed for daily living? No   Food Insecurity   Within the past 12 months, you worried that your food would run out before you got the money to buy more. Never true   Within the past 12 months, the food you bought just didn't last and you didn't have money to get more. Never true

## 2024-01-18 NOTE — H&P
[facility] - Emergency Dept  Pulmonary Critical Care Note    Patient Name: Daisy Calderon  MRN: [id number]  Admission Date: 2024  Hospital Length of Stay: 0 days  Code Status: No Order  Attending Provider: Kwadwo Snow MD  Primary Care Provider: Yana Pearce MD     Subjective:     HPI:   This is a 68-year-old  female past medical history of type 1 diabetes, hypertension, and hypothyroidism.  She presented to the emergency department today with complaints generalize weakness, malaise, nausea, and vomiting that started late this morning around 10.  States that she has been having issues with both her insulin pump and Dexcom not monitoring her glucose correctly with CBG is in the 400s and also requiring her to inject insulin herself.  In the ED patient was tachycardic pulse of 126, blood pressure 105/51, satting 97% on room air.  Bicarb 11, anion gap 28, beta hydroxy 11.9.  Urinalysis pending.  VBG pH 6.9.  Trop negative    Hospital Course/Significant events:      24 Hour Interval History:      Past Medical History:   Diagnosis Date    Diabetes type I     HTN (hypertension)     Hypothyroidism, unspecified     Tobacco user        Past Surgical History:   Procedure Laterality Date    CARPAL TUNNEL RELEASE      CATARACT EXTRACTION       SECTION      CHOLECYSTECTOMY      TOTAL ABDOMINAL HYSTERECTOMY      TUBAL LIGATION         Social History     Socioeconomic History    Marital status: Other   Tobacco Use    Smoking status: Every Day     Current packs/day: 0.25     Types: Cigarettes    Smokeless tobacco: Never   Substance and Sexual Activity    Alcohol use: Yes     Alcohol/week: 6.0 standard drinks of alcohol     Types: 6 Cans of beer per week     Social Determinants of Health     Financial Resource Strain: Low Risk  (2023)    Overall Financial Resource Strain (CARDIA)     Difficulty of Paying Living Expenses: Not hard at all   Food Insecurity: No Food Insecurity  (7/26/2023)    Hunger Vital Sign     Worried About Running Out of Food in the Last Year: Never true     Ran Out of Food in the Last Year: Never true   Transportation Needs: No Transportation Needs (7/26/2023)    PRAPARE - Transportation     Lack of Transportation (Medical): No     Lack of Transportation (Non-Medical): No   Physical Activity: Sufficiently Active (7/26/2023)    Exercise Vital Sign     Days of Exercise per Week: 5 days     Minutes of Exercise per Session: 30 min   Stress: No Stress Concern Present (7/26/2023)    [place] [employer] - Occupational Stress Questionnaire     Feeling of Stress : Not at all   Social Connections: Moderately Integrated (7/26/2023)    Social Connection and Isolation Panel [NHANES]     Frequency of Communication with Friends and Family: Twice a week     Frequency of Social Gatherings with Friends and Family: Once a week     Attends Alevism Services: More than 4 times per year     Active Member of Clubs or Organizations: No     Attends Club or Organization Meetings: Never     Marital Status: Living with partner   Housing Stability: Low Risk  (7/26/2023)    Housing Stability Vital Sign     Unable to Pay for Housing in the Last Year: No     Number of Places Lived in the Last Year: 1     Unstable Housing in the Last Year: No           Current Outpatient Medications   Medication Instructions    amoxicillin-clavulanate 500-125mg (AUGMENTIN) 500-125 mg Tab 500 mg, Oral, 2 times daily    insulin aspart U-100 (NOVOLOG) 67 Units, Subcutaneous, Daily, Average taken 34 units daily    Lactobacillus rhamnosus GG (CULTURELLE) 10 billion cell capsule 1 capsule, Oral, Daily PRN    latanoprost 0.005 % ophthalmic solution 1 drop, Ophthalmic, Nightly    levothyroxine (SYNTHROID) 150 mcg, Oral, Before breakfast, Mon - Sat no pill on Sun    lisinopriL 10 mg, Oral, Daily    loratadine (CLARITIN) 5 mg, Oral, Daily    zoledronic acid-mannitol & water (RECLAST) 5 mg/100 mL PgBk 5  mg, Intravenous, Once       Current Inpatient Medications   lactated ringers  1,000 mL Intravenous Once    magnesium sulfate IVPB  2 g Intravenous Once       Current Intravenous Infusions   sodium chloride 0.45%      0.45% NaCl with KCl 20 mEq infusion      0/9% NACL & POTASSIUM CHLORIDE 20 MEQ/L      sodium chloride 0.9% 1,000 mL (01/17/24 1815)    sodium chloride 0.9%      dextrose 5 % and 0.45 % NaCl      dextrose 5 % and 0.45 % NaCl      dextrose 5 % and 0.45 % NaCl with KCl 20 mEq      insulin regular 1 units/mL infusion orderable (DKA) 0.1 Units/kg/hr (01/17/24 1808)         Review of Systems   Constitutional:  Positive for malaise/fatigue. Negative for chills and fever.   Eyes:  Negative for blurred vision and double vision.   Respiratory:  Positive for cough. Negative for sputum production and shortness of breath.    Cardiovascular:  Negative for chest pain and palpitations.   Gastrointestinal:  Positive for abdominal pain, nausea and vomiting. Negative for blood in stool, constipation and diarrhea.   Genitourinary:  Negative for dysuria, frequency, hematuria and urgency.          Objective:     No intake or output data in the 24 hours ending 01/17/24 2010      Vital Signs (Most Recent):  Temp: 98 °F (36.7 °C) (01/17/24 1637)  Pulse: (!) 126 (01/17/24 2001)  Resp: 18 (01/17/24 2000)  BP: (!) 105/51 (01/17/24 2001)  SpO2: 97 % (01/17/24 2001)  Body mass index is 19.2 kg/m².  Weight: 47.6 kg (105 lb) Vital Signs (24h Range):  Temp:  [98 °F (36.7 °C)] 98 °F (36.7 °C)  Pulse:  [112-128] 126  Resp:  [15-22] 18  SpO2:  [96 %-98 %] 97 %  BP: ()/(47-69) 105/51     Physical Exam  Vitals and nursing note reviewed.   Constitutional:       General: She is not in acute distress.     Appearance: Normal appearance. She is normal weight. She is not ill-appearing or toxic-appearing.   HENT:      Head: Normocephalic and atraumatic.      Mouth/Throat:      Mouth: Mucous membranes are dry.      Pharynx: Oropharynx is  clear.   Eyes:      Extraocular Movements: Extraocular movements intact.      Conjunctiva/sclera: Conjunctivae normal.      Pupils: Pupils are equal, round, and reactive to light.   Cardiovascular:      Rate and Rhythm: Regular rhythm. Tachycardia present.      Pulses:           Dorsalis pedis pulses are 1+ on the right side and 1+ on the left side.        Posterior tibial pulses are 1+ on the right side and 1+ on the left side.      Heart sounds: No murmur heard.  Pulmonary:      Effort: Pulmonary effort is normal. No respiratory distress.      Breath sounds: Normal breath sounds. No wheezing.   Abdominal:      General: Abdomen is flat. Bowel sounds are normal. There is no distension.      Palpations: Abdomen is soft. There is no mass.      Tenderness: There is no abdominal tenderness.   Musculoskeletal:         General: No swelling.      Right lower leg: No edema.      Left lower leg: No edema.   Skin:     General: Skin is warm and dry.   Neurological:      General: No focal deficit present.      Mental Status: She is alert and oriented to person, place, and time.           Lines/Drains/Airways       Peripheral Intravenous Line  Duration                  Peripheral IV - Single Lumen 01/17/24 1655 20 G Right Forearm <1 day         Peripheral IV - Single Lumen 01/17/24 1740 18 G Left Upper Arm <1 day         Peripheral IV - Single Lumen 01/17/24 22 G Left Wrist <1 day                    Significant Labs:    Lab Results   Component Value Date    WBC 20.25 (H) 01/17/2024    HGB 13.3 01/17/2024    HCT 41.7 01/17/2024    .2 (H) 01/17/2024     01/17/2024           BMP  Lab Results   Component Value Date     01/17/2024    K 5.1 01/17/2024    CHLORIDE 98 01/17/2024    CO2 11 (L) 01/17/2024    BUN 27.5 (H) 01/17/2024    CREATININE 1.23 (H) 01/17/2024    CALCIUM 10.1 01/17/2024    EGFRNONAA >60 01/14/2022         ABG  Recent Labs   Lab 01/17/24  1706   PH 6.970*   PO2 39.0   PCO2 39.0   HCO3 9.0    POCBASEDEF -22.30       Mechanical Ventilation Support:         Significant Imaging:  I have reviewed the pertinent imaging within the past 24 hours.        Assessment/Plan:     Assessment  T1DM presenting with DKA  Anion gap metabolic acidosis  Acute kidney injury likely from dehydration secondary to above  Leukocytosis reactive versus infectious      Plan  1. Will put on insulin drip.  BMP q.4.  P.r.n. potassium, magnesium, phosphate repletion.  Once gap closes and bicarb greater than 18 can transition to subcu insulin and start diet then stop insulin drip after 2 hours.  2. Status post 3 L LR follow-up with DKA fluid order  3. Pending urinalysis and blood cultures . Patient refused CXR    DVT Prophylaxis:  Lovenox  GI Prophylaxis:  Famotidine     32 minutes of critical care was time spent personally by me on the following activities: development of treatment plan with patient or surrogate and bedside caregivers, discussions with consultants, evaluation of patient's response to treatment, examination of patient, ordering and performing treatments and interventions, ordering and review of laboratory studies, ordering and review of radiographic studies, pulse oximetry, re-evaluation of patient's condition.  This critical care time did not overlap with that of any other provider or involve time for any procedures.     Raymond Moy DO  Pulmonary Critical Care Medicine  [facility] - Emergency Dept  DOS: 01/17/2024

## 2024-01-18 NOTE — PROGRESS NOTES
Inpatient Nutrition Assessment    Admit Date: 1/17/2024   Total duration of encounter: 1 day   Patient Age: 68 y.o.    Nutrition Recommendation/Prescription     When appropriate, diabetic diet as tolerated.    Communication of Recommendations: reviewed with nurse, reviewed with patient, and reviewed with family    Nutrition Assessment     Chart Review    Reason Seen: continuous nutrition monitoring    Malnutrition Screening Tool Results   Have you recently lost weight without trying?: No  Have you been eating poorly because of a decreased appetite?: No   MST Score: 0   Diagnosis:  T1DM presenting with DKA  Anion gap metabolic acidosis  Acute kidney injury likely from dehydration secondary to above  Leukocytosis reactive versus infectious    Relevant Medical History:  type 1 diabetes, hypertension, and hypothyroidism     Scheduled Medications:  enoxparin, 40 mg, Q24H (prophylaxis, 1700)  famotidine (PF), 20 mg, Daily  mupirocin, , BID    Continuous Infusions:  sodium chloride 0.45%  0.45% NaCl with KCl 20 mEq infusion  0/9% NACL & POTASSIUM CHLORIDE 20 MEQ/L  sodium chloride 0.9%  dextrose 5 % and 0.45 % NaCl  dextrose 5 % and 0.45 % NaCl  dextrose 5 % and 0.45 % NaCl with KCl 20 mEq, Last Rate: 150 mL/hr at 01/18/24 0624  insulin regular 1 units/mL infusion orderable (DKA), Last Rate: 0.02 Units/kg/hr (01/18/24 0624)  NORepinephrine bitartrate-D5W, Last Rate: 0.02 mcg/kg/min (01/18/24 0624)    PRN Medications: dextrose 10 % in water (D10W), dextrose 10 % in water (D10W), dextrose 10%, dextrose 10%, dextrose 5 % and 0.45 % NaCl, magnesium sulfate IVPB, potassium chloride in water, sodium chloride 0.9%, sodium phosphate 20.01 mmol in dextrose 5 % (D5W) 250 mL IVPB    Calorie Containing IV Medications: no significant kcals from medications at this time    Recent Labs   Lab 01/17/24  1700 01/17/24  2308 01/18/24  0043 01/18/24  0320 01/18/24  0830    138 139 136 135*   K 5.1 3.7 3.9 4.2 4.5   CALCIUM 10.1 8.9 8.9  "8.7 8.3*   PHOS  --   --  3.0  --   --    MG 2.00  --  1.50*  --   --    CHLORIDE 98 108* 108* 110* 109*   CO2 11* 14* 14* 20* 20*   BUN 27.5* 24.9* 25.3* 21.8* 17.2   CREATININE 1.23* 0.89 0.90 0.84 0.78   EGFRNORACEVR 48 >60 >60 >60 >60   GLUCOSE 579* 223* 203* 204* 163*   BILITOT 0.6  --  0.4  --   --    ALKPHOS 81  --  61  --   --    ALT 21  --  19  --   --    AST 25  --  26  --   --    ALBUMIN 3.9  --  3.2*  --   --    LIPASE 14  --   --   --   --    WBC 20.25*  --  18.15*  --   --    HGB 13.3  --  10.8*  --   --    HCT 41.7  --  33.2*  --   --      Nutrition Orders:  No diet orders on file    Appetite/Oral Intake: NPO/not applicable  Factors Affecting Nutritional Intake: NPO  Food/Voodoo/Cultural Preferences: none reported  Food Allergies: none reported  Last Bowel Movement: 24  Wound(s): no pressure injuries documented at this time     Comments    24 Patient reports nausea, vomiting, diarrhea x 1 day, good appetite/intake prior, denies weight loss.    Anthropometrics    Height: 5' 2" (157.5 cm), Height Method: Stated  Last Weight: 44.1 kg (97 lb 3.6 oz) (24), Weight Method: Bed Scale  BMI (Calculated): 17.8  BMI Classification: underweight (BMI less than 18.5)     Ideal Body Weight (IBW), Female: 110 lb     % Ideal Body Weight, Female (lb): 88.38 %                    Usual Body Weight (UBW), k.6 kg  % Usual Body Weight: 92.84     Usual Weight Provided By: patient denies unintentional weight loss    Wt Readings from Last 5 Encounters:   24 44.1 kg (97 lb 3.6 oz)   23 47.4 kg (104 lb 9.6 oz)   23 49.7 kg (109 lb 9.6 oz)   22 49 kg (108 lb)   22 49 kg (107 lb 15.7 oz)     Weight Change(s) Since Admission: bed weight during rounds of 55 kg on , question accuracy of documented weights, patient dehydrated on admission   Wt Readings from Last 1 Encounters:   24 2044 44.1 kg (97 lb 3.6 oz)   24 1637 47.6 kg (105 lb)   Admit Weight: 47.6 kg " (105 lb) (01/17/24 8027), Weight Method: Bed Scale    Nutrition Goals & Monitoring     Dietitian will monitor: food and beverage intake    Nutrition Risk/Follow-Up: low (follow-up in 5-7 days)   Please consult if re-assessment needed sooner.

## 2024-01-18 NOTE — PLAN OF CARE
Problem: Adult Inpatient Plan of Care  Goal: Plan of Care Review  Outcome: Ongoing, Progressing  Flowsheets (Taken 1/18/2024 0323)  Plan of Care Reviewed With: patient  Goal: Patient-Specific Goal (Individualized)  Outcome: Ongoing, Progressing  Goal: Absence of Hospital-Acquired Illness or Injury  Outcome: Ongoing, Progressing  Intervention: Identify and Manage Fall Risk  Flowsheets (Taken 1/18/2024 0323)  Safety Promotion/Fall Prevention:   assistive device/personal item within reach   Fall Risk reviewed with patient/family   Fall Risk signage in place   lighting adjusted   medications reviewed   pulse ox   nonskid shoes/socks when out of bed   room near unit station   instructed to call staff for mobility   supervised activity  Intervention: Prevent Skin Injury  Flowsheets (Taken 1/18/2024 0323)  Body Position: position changed independently  Skin Protection:   adhesive use limited   incontinence pads utilized   pulse oximeter probe site changed   silicone foam dressing in place  Intervention: Prevent and Manage VTE (Venous Thromboembolism) Risk  Flowsheets (Taken 1/18/2024 0323)  Activity Management: Sitting at edge of bed - L2  VTE Prevention/Management:   remove, assess skin, and reapply sequential compression device   dorsiflexion/plantar flexion performed   ROM (active) performed  Range of Motion: active ROM (range of motion) encouraged  Intervention: Prevent Infection  Flowsheets (Taken 1/18/2024 0323)  Infection Prevention:   environmental surveillance performed   equipment surfaces disinfected   hand hygiene promoted   personal protective equipment utilized   rest/sleep promoted   single patient room provided  Goal: Optimal Comfort and Wellbeing  Outcome: Ongoing, Progressing  Intervention: Monitor Pain and Promote Comfort  Flowsheets (Taken 1/18/2024 0323)  Pain Management Interventions:   quiet environment facilitated   relaxation techniques promoted   position adjusted  Intervention: Provide  Person-Centered Care  Flowsheets (Taken 1/18/2024 0323)  Trust Relationship/Rapport:   care explained   choices provided   emotional support provided   empathic listening provided   questions answered   questions encouraged   reassurance provided   thoughts/feelings acknowledged  Goal: Readiness for Transition of Care  Outcome: Ongoing, Progressing     Problem: Diabetes Comorbidity  Goal: Blood Glucose Level Within Targeted Range  Outcome: Ongoing, Progressing  Intervention: Monitor and Manage Glycemia  Flowsheets (Taken 1/18/2024 0323)  Glycemic Management: blood glucose monitored

## 2024-01-18 NOTE — NURSING
Nurses Note -- 4 Eyes      1/18/2024   5:06 PM      Skin assessed during: Daily Assessment      [x] No Altered Skin Integrity Present    [x]Prevention Measures Documented      [] Yes- Altered Skin Integrity Present or Discovered   [] LDA Added if Not in Epic (Describe Wound)   [] New Altered Skin Integrity was Present on Admit and Documented in LDA   [] Wound Image Taken    Wound Care Consulted? No    Attending Nurse:  Guanako Gomez RN/Staff Member:  Cecelia GALLEGOS

## 2024-01-18 NOTE — PROGRESS NOTES
Pharmacist Renal Dose Adjustment Note    Daisy Calderon is a 68 y.o. female being treated with the medication famotidine    Patient Data:    Vital Signs (Most Recent):  Temp: 98 °F (36.7 °C) (01/17/24 1637)  Pulse: (!) 126 (01/17/24 2001)  Resp: 18 (01/17/24 2000)  BP: (!) 105/51 (01/17/24 2001)  SpO2: 97 % (01/17/24 2001) Vital Signs (72h Range):  Temp:  [98 °F (36.7 °C)]   Pulse:  [112-128]   Resp:  [15-22]   BP: ()/(47-69)   SpO2:  [96 %-98 %]      Recent Labs   Lab 01/17/24  1700   CREATININE 1.23*     Serum creatinine: 1.23 mg/dL (H) 01/17/24 1700  Estimated creatinine clearance: 33.4 mL/min (A)    Medication:famotidine dose: 20mg frequency bid will be changed to medication:famotidine dose:20mg frequency:daily    Pharmacist's Name: Elizabeth Powell  Pharmacist's Extension: [phone]

## 2024-01-18 NOTE — NURSING
Nurses Note -- 4 Eyes      1/17/2024   9:41 PM      Skin assessed during: Admit      [x] No Altered Skin Integrity Present    [x]Prevention Measures Documented      [] Yes- Altered Skin Integrity Present or Discovered   [] LDA Added if Not in Epic (Describe Wound)   [] New Altered Skin Integrity was Present on Admit and Documented in LDA   [] Wound Image Taken    Wound Care Consulted? No    Attending Nurse:  Cathryn Gomez RN/Staff Member:  EL Elliott

## 2024-01-19 ENCOUNTER — TELEPHONE (OUTPATIENT)
Dept: RADIOLOGY | Facility: HOSPITAL | Age: 69
End: 2024-01-19
Payer: MEDICARE

## 2024-01-19 VITALS
DIASTOLIC BLOOD PRESSURE: 91 MMHG | SYSTOLIC BLOOD PRESSURE: 165 MMHG | BODY MASS INDEX: 17.9 KG/M2 | WEIGHT: 97.25 LBS | HEART RATE: 92 BPM | RESPIRATION RATE: 20 BRPM | TEMPERATURE: 98 F | OXYGEN SATURATION: 94 % | HEIGHT: 62 IN

## 2024-01-19 DIAGNOSIS — R92.8 ABNORMAL FINDINGS ON DIAGNOSTIC IMAGING OF BREAST: Primary | ICD-10-CM

## 2024-01-19 LAB
ALBUMIN SERPL-MCNC: 3.1 G/DL (ref 3.4–4.8)
ALBUMIN/GLOB SERPL: 1.3 RATIO (ref 1.1–2)
ALP SERPL-CCNC: 59 UNIT/L (ref 40–150)
ALT SERPL-CCNC: 19 UNIT/L (ref 0–55)
AST SERPL-CCNC: 27 UNIT/L (ref 5–34)
BASOPHILS # BLD AUTO: 0.02 X10(3)/MCL
BASOPHILS NFR BLD AUTO: 0.2 %
BILIRUB SERPL-MCNC: 0.5 MG/DL
BUN SERPL-MCNC: 7 MG/DL (ref 9.8–20.1)
CALCIUM SERPL-MCNC: 8.6 MG/DL (ref 8.4–10.2)
CHLORIDE SERPL-SCNC: 109 MMOL/L (ref 98–107)
CO2 SERPL-SCNC: 25 MMOL/L (ref 23–31)
CREAT SERPL-MCNC: 0.65 MG/DL (ref 0.55–1.02)
EOSINOPHIL # BLD AUTO: 0.24 X10(3)/MCL (ref 0–0.9)
EOSINOPHIL NFR BLD AUTO: 2.5 %
ERYTHROCYTE [DISTWIDTH] IN BLOOD BY AUTOMATED COUNT: 12 % (ref 11.5–17)
GFR SERPLBLD CREATININE-BSD FMLA CKD-EPI: >60 MLS/MIN/1.73/M2
GLOBULIN SER-MCNC: 2.4 GM/DL (ref 2.4–3.5)
GLUCOSE SERPL-MCNC: 132 MG/DL (ref 82–115)
HCT VFR BLD AUTO: 34.2 % (ref 37–47)
HGB BLD-MCNC: 11.9 G/DL (ref 12–16)
IMM GRANULOCYTES # BLD AUTO: 0.02 X10(3)/MCL (ref 0–0.04)
IMM GRANULOCYTES NFR BLD AUTO: 0.2 %
LYMPHOCYTES # BLD AUTO: 1.93 X10(3)/MCL (ref 0.6–4.6)
LYMPHOCYTES NFR BLD AUTO: 20.3 %
MAGNESIUM SERPL-MCNC: 1.6 MG/DL (ref 1.6–2.6)
MCH RBC QN AUTO: 34.9 PG (ref 27–31)
MCHC RBC AUTO-ENTMCNC: 34.8 G/DL (ref 33–36)
MCV RBC AUTO: 100.3 FL (ref 80–94)
MONOCYTES # BLD AUTO: 0.5 X10(3)/MCL (ref 0.1–1.3)
MONOCYTES NFR BLD AUTO: 5.3 %
NEUTROPHILS # BLD AUTO: 6.78 X10(3)/MCL (ref 2.1–9.2)
NEUTROPHILS NFR BLD AUTO: 71.5 %
NRBC BLD AUTO-RTO: 0 %
PHOSPHATE SERPL-MCNC: 2.3 MG/DL (ref 2.3–4.7)
PLATELET # BLD AUTO: 189 X10(3)/MCL (ref 130–400)
PMV BLD AUTO: 9.4 FL (ref 7.4–10.4)
POTASSIUM SERPL-SCNC: 4 MMOL/L (ref 3.5–5.1)
PROT SERPL-MCNC: 5.5 GM/DL (ref 5.8–7.6)
RBC # BLD AUTO: 3.41 X10(6)/MCL (ref 4.2–5.4)
SODIUM SERPL-SCNC: 137 MMOL/L (ref 136–145)
WBC # SPEC AUTO: 9.49 X10(3)/MCL (ref 4.5–11.5)

## 2024-01-19 PROCEDURE — 1111F DSCHRG MED/CURRENT MED MERGE: CPT | Mod: CPTII,,, | Performed by: INTERNAL MEDICINE

## 2024-01-19 PROCEDURE — 83735 ASSAY OF MAGNESIUM: CPT | Performed by: INTERNAL MEDICINE

## 2024-01-19 PROCEDURE — 25000003 PHARM REV CODE 250: Performed by: INTERNAL MEDICINE

## 2024-01-19 PROCEDURE — 84100 ASSAY OF PHOSPHORUS: CPT | Performed by: INTERNAL MEDICINE

## 2024-01-19 PROCEDURE — 80053 COMPREHEN METABOLIC PANEL: CPT | Performed by: INTERNAL MEDICINE

## 2024-01-19 PROCEDURE — 85025 COMPLETE CBC W/AUTO DIFF WBC: CPT | Performed by: INTERNAL MEDICINE

## 2024-01-19 PROCEDURE — 99239 HOSP IP/OBS DSCHRG MGMT >30: CPT | Mod: ,,, | Performed by: INTERNAL MEDICINE

## 2024-01-19 RX ADMIN — FAMOTIDINE 20 MG: 10 INJECTION, SOLUTION INTRAVENOUS at 08:01

## 2024-01-19 NOTE — DISCHARGE SUMMARY
[facility] Hospital Medicine Discharge Summary    Admit Date: 1/17/2024  Discharge Date and Time: 1/19/202411:23 AM  Admitting Physician:  Team  Discharging Physician: Yana Pearce MD.  Primary Care Physician: Yana Pearec MD  Consults: None    Discharge Diagnoses:  Diabetes Mellitus Type 1    Hospital Course:     Ms. Calderon is a 68-year-old female with type 1 diabetes mellitus, hypertension, hypothyroidism that presented with complaints of generalized weakness, malaise, nausea and vomiting.  Patient had some issues with her insulin pump again with her Dexcom not being able to monitor her glucose correctly.  Her CBGS went up in the 400 range and patient tried to inject herself with insulin.  However upon arrival in the ED she was noted to have a bicarb of 11 with an anion gap of 28 and a beta hydroxy level of 11.9.  She was admitted to the ICU under DKA protocol on an insulin drip.  Electrolytes including potassium, magnesium were all repleted.  She was successfully transitioned to subcutaneous insulin and started using her pump again once her anion gap started to improve.  Overall did well and has been tolerating p.o. without any issues.  Her other medical comorbidities have remained stable and she will be discharged home today     Pt was seen and examined on the day of discharge  Vitals:  VITAL SIGNS: 24 HRS MIN & MAX LAST   Temp  Min: 98.1 °F (36.7 °C)  Max: 99 °F (37.2 °C) 98.2 °F (36.8 °C)   BP  Min: 99/62  Max: 165/91 (!) 165/91   Pulse  Min: 83  Max: 108  92   Resp  Min: 8  Max: 28 20   SpO2  Min: 89 %  Max: 100 % (!) 94 %       Physical Exam:  General:  Alert and oriented, no acute distress   Cardiovascular:  S1-S2, tachycardia, no murmurs   Abdomen: Soft, nontender  Neuro:  Nonfocal   Respiratory: Clear to auscultation bilaterally    Procedures Performed: No admission procedures for hospital encounter.     Significant Diagnostic Studies: See Full  reports for all details    Recent Labs   Lab 01/17/24  1700 01/18/24  0043 01/19/24  0632   WBC 20.25* 18.15* 9.49   RBC 3.82* 3.07* 3.41*   HGB 13.3 10.8* 11.9*   HCT 41.7 33.2* 34.2*   .2* 108.1* 100.3*   MCH 34.8* 35.2* 34.9*   MCHC 31.9* 32.5* 34.8   RDW 12.0 12.1 12.0    234 189   MPV 9.9 9.6 9.4       Recent Labs   Lab 01/17/24  1700 01/17/24  1706 01/17/24  2308 01/18/24  0043 01/18/24  0320 01/18/24  0830 01/18/24  1145 01/19/24  0632     --    < > 139   < > 135* 134* 137   K 5.1  --    < > 3.9   < > 4.5 4.1 4.0   CO2 11*  --    < > 14*   < > 20* 24 25   BUN 27.5*  --    < > 25.3*   < > 17.2 17.3 7.0*   CREATININE 1.23*  --    < > 0.90   < > 0.78 0.74 0.65   CALCIUM 10.1  --    < > 8.9   < > 8.3* 8.5 8.6   PH  --  6.970*  --   --   --   --   --   --    MG 2.00  --   --  1.50*  --   --   --  1.60   ALBUMIN 3.9  --   --  3.2*  --   --   --  3.1*   ALKPHOS 81  --   --  61  --   --   --  59   ALT 21  --   --  19  --   --   --  19   AST 25  --   --  26  --   --   --  27   BILITOT 0.6  --   --  0.4  --   --   --  0.5    < > = values in this interval not displayed.        Microbiology Results (last 7 days)       Procedure Component Value Units Date/Time    Blood Culture [[id number]]  (Normal) Collected: 01/17/24 2141    Order Status: Completed Specimen: Blood from Arm, Left Updated: 01/19/24 0001     CULTURE, BLOOD ([facility]) No Growth At 24 Hours    Blood culture #1 **CANNOT BE ORDERED STAT** [[id number]]  (Normal) Collected: 01/17/24 1700    Order Status: Completed Specimen: Blood Updated: 01/19/24 0001     CULTURE, BLOOD ([facility]) No Growth At 24 Hours    Blood culture #2 **CANNOT BE ORDERED STAT** [[id number]]  (Normal) Collected: 01/17/24 1700    Order Status: Completed Specimen: Blood Updated: 01/19/24 0001     CULTURE, BLOOD ([facility]) No Growth At 24 Hours    Blood Culture [[id number]]  (Normal) Collected: 01/17/24 2149    Order Status: Completed Specimen: Blood from Arm, Right Updated: 01/18/24 2307      CULTURE, BLOOD ([facility]) No Growth At 24 Hours             Mammo Digital Diagnostic Right with Calvin   - MAMMO DIGITAL DIAGNOSTIC RIGHT WITH CALVIN    UNILATERAL RIGHT DIGITAL DIAGNOSTIC MAMMOGRAM 3D/2D WITH CAD: 12/20/2023  HISTORY: 68-year-old female who was recalled from screening for further evaluation of calcifications in the right breast.      COMPARISONS: Comparison is made to exams dated:  12/13/2023 mammogram and 12/14/2016 mammogram - [facility].      TECHNIQUE: Digital mammography views were performed with tomosynthesis. Current study was evaluated with a Computer Aided Detection (CAD) system.     BREAST COMPOSITION: The right breast is heterogeneously dense, which may obscure small masses.      MAMMOGRAM/TOMOSYNTHESIS FINDINGS:  In the upper-outer quadrant of the right breast, anterior to middle depth, there are several amorphous calcifications in a regional distribution which span 50 mm.  These calcifications correspond to those recalled on the screening examination dated 12/13/2023.    No other suspicious masses, calcifications, or other signs of malignancy are identified.          IMPRESSION: SUSPICIOUS OF MALIGNANCY  Amorphous calcifications in a regional distribution spanning 50 mm in the upper-outer quadrant of the right breast, anterior to middle depths, correspond to those recalled from screening and are are suspicious.  A 2 site right breast tomosynthesis guided core needle biopsy is recommended to be performed at the discretion of the performing radiologist; however, suggested targets are labeled on the images.    [facility] staff will facilitate scheduling the patient for biopsy.    I discussed these findings and recommendations with the patient at the conclusion of today's visit.    Tone [place]/:12/20/2023 16:07:08      letter sent: Mammography Abnormal    Mammogram BI-RADS: 4 Suspicious         Medication List         CONTINUE taking these medications      insulin aspart U-100 100 unit/mL injection  Commonly known as: NovoLOG     Lactobacillus rhamnosus GG 10 billion cell capsule  Commonly known as: CULTURELLE     latanoprost 0.005 % ophthalmic solution     levothyroxine 150 MCG tablet  Commonly known as: SYNTHROID     lisinopriL 10 MG tablet     loratadine 10 mg tablet  Commonly known as: CLARITIN     zoledronic acid-mannitol & water 5 mg/100 mL Pgbk  Commonly known as: RECLAST            STOP taking these medications      amoxicillin-clavulanate 500-125mg 500-125 mg Tab  Commonly known as: AUGMENTIN               Explained in detail to the patient about the discharge plan, medications, and follow-up visits. Pt understands and agrees with the treatment plan  Discharge Disposition:  Home  Discharged Condition: stable  Diet- Dianetic  Dietary Orders (From admission, onward)       Start     Ordered    01/18/24 1258  Diet diabetic  Diet effective now         01/18/24 1258                   Medications Per DC med rec  Activities as tolerated   Follow-up Information       Yana Pearce MD Follow up on 1/25/2024.    Specialty: Internal Medicine  Why: @11:20  Contact information:  [address] [facility] [address]  [phone]                           For further questions contact DR Pearce'soffice    Discharge time 33 minutes    For worsening symptoms, chest pain, shortness of breath, increased abdominal pain, high grade fever, stroke or stroke like symptoms, immediately go to the nearest Emergency Room or call 911 as soon as possible.      Yana Menard M.D, on 1/19/2024. at 11:23 AM.

## 2024-01-19 NOTE — PLAN OF CARE
01/19/24 0833   Medicare Message   Important Message from Medicare regarding Discharge Appeal Rights Given to patient/caregiver;Explained to patient/caregiver

## 2024-01-19 NOTE — NURSING
Nurses Note -- 4 Eyes      1/19/2024   12:25 AM      Skin assessed during: Daily Assessment      [x] No Altered Skin Integrity Present    [x]Prevention Measures Documented      [] Yes- Altered Skin Integrity Present or Discovered   [] LDA Added if Not in Epic (Describe Wound)   [] New Altered Skin Integrity was Present on Admit and Documented in LDA   [] Wound Image Taken    Wound Care Consulted? No    Attending Nurse:  Jailene Gomez RN/Staff Member:  EL Garcia

## 2024-01-22 ENCOUNTER — PATIENT OUTREACH (OUTPATIENT)
Dept: ADMINISTRATIVE | Facility: CLINIC | Age: 69
End: 2024-01-22
Payer: MEDICARE

## 2024-01-22 LAB — BACTERIA BLD CULT: NORMAL

## 2024-01-22 RX ORDER — MULTIVITAMIN
1 TABLET ORAL DAILY
COMMUNITY

## 2024-01-22 NOTE — PROGRESS NOTES
C3 nurse spoke with Daisy Calderon  for a TCC post hospital discharge follow up call. The patient has a scheduled HOS appointment with Yana Pearce MD  on 01/25/2024 @ 1120. Patient unsure she can keep this appointment and will go to scheduled appointment 01/29/2024. Advised to call PCP office to make aware.

## 2024-01-23 LAB
BACTERIA BLD CULT: NORMAL

## 2024-01-29 ENCOUNTER — OFFICE VISIT (OUTPATIENT)
Dept: INTERNAL MEDICINE | Facility: CLINIC | Age: 69
End: 2024-01-29
Payer: MEDICARE

## 2024-01-29 VITALS
WEIGHT: 115 LBS | SYSTOLIC BLOOD PRESSURE: 128 MMHG | OXYGEN SATURATION: 97 % | HEART RATE: 86 BPM | HEIGHT: 62 IN | TEMPERATURE: 97 F | DIASTOLIC BLOOD PRESSURE: 66 MMHG | BODY MASS INDEX: 21.16 KG/M2

## 2024-01-29 DIAGNOSIS — E10.3593 TYPE 1 DIABETES MELLITUS WITH PROLIFERATIVE DIABETIC RETINOPATHY WITHOUT MACULAR EDEMA, BILATERAL: ICD-10-CM

## 2024-01-29 DIAGNOSIS — E03.9 ACQUIRED HYPOTHYROIDISM: Primary | ICD-10-CM

## 2024-01-29 PROCEDURE — 1159F MED LIST DOCD IN RCRD: CPT | Mod: CPTII,,, | Performed by: INTERNAL MEDICINE

## 2024-01-29 PROCEDURE — 1111F DSCHRG MED/CURRENT MED MERGE: CPT | Mod: CPTII,,, | Performed by: INTERNAL MEDICINE

## 2024-01-29 PROCEDURE — 1126F AMNT PAIN NOTED NONE PRSNT: CPT | Mod: CPTII,,, | Performed by: INTERNAL MEDICINE

## 2024-01-29 PROCEDURE — 99495 TRANSJ CARE MGMT MOD F2F 14D: CPT | Mod: ,,, | Performed by: INTERNAL MEDICINE

## 2024-01-29 PROCEDURE — 3078F DIAST BP <80 MM HG: CPT | Mod: CPTII,,, | Performed by: INTERNAL MEDICINE

## 2024-01-29 PROCEDURE — 3074F SYST BP LT 130 MM HG: CPT | Mod: CPTII,,, | Performed by: INTERNAL MEDICINE

## 2024-01-29 PROCEDURE — 1160F RVW MEDS BY RX/DR IN RCRD: CPT | Mod: CPTII,,, | Performed by: INTERNAL MEDICINE

## 2024-01-29 PROCEDURE — 1101F PT FALLS ASSESS-DOCD LE1/YR: CPT | Mod: CPTII,,, | Performed by: INTERNAL MEDICINE

## 2024-01-29 PROCEDURE — 3288F FALL RISK ASSESSMENT DOCD: CPT | Mod: CPTII,,, | Performed by: INTERNAL MEDICINE

## 2024-01-29 NOTE — PROGRESS NOTES
Transitional Care Note  Subjective:   Patient ID: Daisy Calderon is a 68 y.o. female.    Chief Complaint: Follow-up (6 month diabetes/Hospital follow up)      Date of Hospital Discharge: 1/19/24   Family and/or Caretaker present at visit?  No  Diagnostic tests reviewed/disposition: No diagnosic tests pending after this hospitalization.  Disease/illness education: performed  Home health/community services discussion/referrals: Patient does not have home health established from hospital visit.  They do not need home health.  If needed, we will set up home health for the patient.   Establishment or re-establishment of referral orders for community resources: No other necessary community resources.   Discussion with other health care providers: No discussion with other health care providers necessary.     HPI: Ms. Calderon is a 68-year-old female with type 1 diabetes mellitus, hypertension, hypothyroidism that here today for hospital discharge follow-up.  She was admitted to the ICU under DKA protocol on an insulin drip.  Electrolytes including potassium, magnesium were all repleted.  She was successfully transitioned to subcutaneous insulin and started using her pump again once her anion gap started to improve.  Overall did well and has not had any acute issues since discharge.  Tolerating p.o. without any issues        Health maintenance reviewed with the patient.  Health maintenance completed:  Health Maintenance Topics with due status: Not Due       Topic Last Completion Date    DEXA Scan 07/27/2022    Colorectal Cancer Screening 02/06/2023    Eye Exam 06/09/2023    Foot Exam 07/26/2023    Mammogram 12/20/2023      Health maintenance due:  Health Maintenance Due   Topic Date Due    Hepatitis C Screening  Never done    Diabetes Urine Screening  Never done    Low Dose Statin  Never done    Shingles Vaccine (1 of 2) Never done    TETANUS VACCINE  10/25/2015    RSV Vaccine (Age 60+ and Pregnant patients) (1 - 1-dose  "60+ series) Never done    Lipid Panel  2024    Hemoglobin A1c  2024      Review of Systems    A comprehensive review of systems is obtained and is essentially negative except for that stated in the HPI     History:     Past Medical History:   Diagnosis Date    Diabetes type I     HTN (hypertension)     Hypothyroidism, unspecified     Tobacco user       Past Surgical History:   Procedure Laterality Date    CARPAL TUNNEL RELEASE      CATARACT EXTRACTION       SECTION      CHOLECYSTECTOMY      TOTAL ABDOMINAL HYSTERECTOMY      TUBAL LIGATION       History reviewed. No pertinent family history.   Social History     Tobacco Use    Smoking status: Every Day     Current packs/day: 0.25     Types: Cigarettes    Smokeless tobacco: Never   Substance and Sexual Activity    Alcohol use: Yes     Alcohol/week: 6.0 standard drinks of alcohol     Types: 6 Cans of beer per week    Drug use: Not on file    Sexual activity: Not on file      Smoking Cessation:  Ready to quit: No  Counseling given: Yes     Non-smoker    Allergies:   Review of patient's allergies indicates:   Allergen Reactions    Levofloxacin      Other reaction(s): itching, swelling    Oxycodone-aspirin     Brimonidine Nausea And Vomiting     Objective:     Vitals:    24 1359   BP: 128/66   Pulse: 86   Temp: 97 °F (36.1 °C)   SpO2: 97%   Weight: 52.2 kg (115 lb)   Height: 5' 2" (1.575 m)   PainSc: 0-No pain     Body mass index is 21.03 kg/m².   Wt Readings from Last 4 Encounters:   24 52.2 kg (115 lb)   24 44.1 kg (97 lb 3.6 oz)   23 47.4 kg (104 lb 9.6 oz)   23 49.7 kg (109 lb 9.6 oz)     Weight change: has been stable.    Physical Examination:   Physical Exam  Constitutional:       Appearance: Normal appearance.   HENT:      Head: Normocephalic and atraumatic.      Nose: Nose normal.      Mouth/Throat:      Mouth: Mucous membranes are moist.      Pharynx: Oropharynx is clear.   Eyes:      Extraocular Movements: " Extraocular movements intact.      Pupils: Pupils are equal, round, and reactive to light.   Cardiovascular:      Rate and Rhythm: Normal rate and regular rhythm.      Pulses: Normal pulses.   Pulmonary:      Effort: Pulmonary effort is normal.      Breath sounds: Normal breath sounds.   Abdominal:      General: Bowel sounds are normal.      Palpations: Abdomen is soft.   Musculoskeletal:         General: Normal range of motion.      Cervical back: Normal range of motion and neck supple.   Skin:     General: Skin is warm.   Neurological:      General: No focal deficit present.      Mental Status: She is alert and oriented to person, place, and time. Mental status is at baseline.   Psychiatric:         Mood and Affect: Mood normal.         Diagnostic Tests:  Significant Imaging: I have reviewed and interpreted all pertinent imaging results/findings.  Mammo Digital Diagnostic Right with Calvin   - MAMMO DIGITAL DIAGNOSTIC RIGHT WITH CALVIN    UNILATERAL RIGHT DIGITAL DIAGNOSTIC MAMMOGRAM 3D/2D WITH CAD: 12/20/2023  HISTORY: 68-year-old female who was recalled from screening for further evaluation of calcifications in the right breast.      COMPARISONS: Comparison is made to exams dated:  12/13/2023 mammogram and 12/14/2016 mammogram - [facility] [place].      TECHNIQUE: Digital mammography views were performed with tomosynthesis. Current study was evaluated with a Computer Aided Detection (CAD) system.     BREAST COMPOSITION: The right breast is heterogeneously dense, which may obscure small masses.      MAMMOGRAM/TOMOSYNTHESIS FINDINGS:  In the upper-outer quadrant of the right breast, anterior to middle depth, there are several amorphous calcifications in a regional distribution which span 50 mm.  These calcifications correspond to those recalled on the screening examination dated 12/13/2023.    No other suspicious masses, calcifications, or other signs of malignancy are identified.           IMPRESSION: SUSPICIOUS OF MALIGNANCY  Amorphous calcifications in a regional distribution spanning 50 mm in the upper-outer quadrant of the right breast, anterior to middle depths, correspond to those recalled from screening and are are suspicious.  A 2 site right breast tomosynthesis guided core needle biopsy is recommended to be performed at the discretion of the performing radiologist; however, suggested targets are labeled on the images.    [facility] staff will facilitate scheduling the patient for biopsy.    I discussed these findings and recommendations with the patient at the conclusion of today's visit.    Tone [place]/:12/20/2023 16:07:08      letter sent: Mammography Abnormal    Mammogram BI-RADS: 4 Suspicious      Labs:   Lab Results   Component Value Date    WBC 9.49 01/19/2024    RBC 3.41 (L) 01/19/2024    HGB 11.9 (L) 01/19/2024    HCT 34.2 (L) 01/19/2024    .3 (H) 01/19/2024    MCH 34.9 (H) 01/19/2024     01/19/2024     01/19/2024    K 4.0 01/19/2024    BUN 7.0 (L) 01/19/2024    CREATININE 0.65 01/19/2024    AST 27 01/19/2024    ALT 19 01/19/2024    BILITOT 0.5 01/19/2024    HGBA1C 6.7 (A) 08/31/2023        Laboratory Data:  All pertinent labs have been reviewed.  I have reviewed the following records today:     Details:   [x] Labs [] Internal  [] External    [] Micro [] Internal  [] External    [] Pathology [] Internal  [] External    [x] Imaging [] Internal  [] External    [x] Cardiology Procedures [] Internal  [] External    [x] Provider Records [] Internal  [] External    [] Other [] Internal  [] External      Medications:     Medication List with Changes/Refills   Current Medications    INSULIN ASPART U-100 (NOVOLOG) 100 UNIT/ML INJECTION    Inject 67 Units into the skin Daily. Average taken 34 units daily    LACTOBACILLUS RHAMNOSUS GG (CULTURELLE) 10 BILLION CELL CAPSULE    Take 1 capsule by mouth daily as needed.     LATANOPROST 0.005 % OPHTHALMIC SOLUTION    Apply 1 drop to eye every evening.    LEVOTHYROXINE (SYNTHROID) 150 MCG TABLET    Take 150 mcg by mouth before breakfast. Mon - Sat no pill on Sun    LISINOPRIL 10 MG TABLET    Take 10 mg by mouth once daily.    LORATADINE (CLARITIN) 10 MG TABLET    Take 5 mg by mouth once daily.    MULTIVITAMIN (ONE DAILY MULTIVITAMIN) PER TABLET    Take 1 tablet by mouth once daily.    ZOLEDRONIC ACID-MANNITOL & WATER (RECLAST) 5 MG/100 ML PGBK    Inject 5 mg into the vein once.     Assessment:     1. Acquired hypothyroidism    2. 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  Continue current medications  2.  Side effects and expected results discussed  3.  Diet and exercise as tolerated  4.  Nutritional support  5.  Health maintenance updated accordingly  6.  Call with increased complaints or concerns  7.  Meds reviewed and reconciled, plan of care discussed with the patient in detail    Follow Up:   Follow up for Medicare Wellness.    I spent greater than 40 minutes today both in chart review and greater than 50% of that time in discussion with the patient regarding health maintenance, diagnoses, diagnostic tests, medications, treatments, symptom management, expected results and adverse effects. Patient verbalized understanding and all questions were answered.    This note includes dictation done on M*Modal word recognition  program.  There are word recognition mistakes that are occasionally missed on review.

## 2024-01-29 NOTE — ASSESSMENT & PLAN NOTE
-doing well, no acute issues   -continue with insulin pump, followed by Endocrinology closely   -no issues since hospital discharge

## 2024-02-06 LAB
CHOLEST SERPL-MSCNC: 193 MG/DL (ref 0–200)
CHOLESTEROL (LIPID PANEL): ABNORMAL
HBA1C MFR BLD: 6.8 % (ref 4–6)
HDLC SERPL-MCNC: 82 MG/DL (ref 35–70)
LDLC SERPL CALC-MCNC: 86 MG/DL (ref 0–160)
TRIGL SERPL-MCNC: 126 MG/DL (ref 40–160)

## 2024-02-08 ENCOUNTER — PATIENT OUTREACH (OUTPATIENT)
Dept: ADMINISTRATIVE | Facility: HOSPITAL | Age: 69
End: 2024-02-08
Payer: MEDICARE

## 2024-02-08 NOTE — LETTER
AUTHORIZATION FOR RELEASE OF   CONFIDENTIAL INFORMATION    Dear BAIRON Ashraf ,    We are seeing Daisy Calderon, date of birth 1955, in the clinic at [place] [address] [facility]. Yana Pearce MD is the patient's PCP. Daisy Calderon has an outstanding lab/procedure at the time we reviewed her chart. In order to help keep her health information updated, she has authorized us to request the following medical record(s):        (  )  MAMMOGRAM                                      (  )  COLONOSCOPY      (  )  PAP SMEAR                                          (  )  OUTSIDE LAB RESULTS     ( X )  DEXA SCAN                                          (  )  EYE EXAM            (  )  FOOT EXAM                                          (  )  ENTIRE RECORD     (  )  OUTSIDE IMMUNIZATIONS                 (  )  _______________         Please fax records to [facility], Bhanushali, Reshma A, MD, [phone]       If you have any questions, please contact Denise at [phone]            Patient Name: Daisy Calderon  : 1955  Patient Phone #: [phone]

## 2024-02-08 NOTE — PROGRESS NOTES
The following record(s)  below were uploaded for Health Maintenance .    DEXA -record request sent to BAIRON Long     HEMOGLOBIN A1c/LIPID PANEL 02/06/24    DM FOOT EXAM 08/31/23    Population Health Outreach.

## 2024-02-17 ENCOUNTER — HOSPITAL ENCOUNTER (INPATIENT)
Facility: HOSPITAL | Age: 69
LOS: 1 days | Discharge: HOME | DRG: 392 | End: 2024-02-18
Attending: STUDENT IN AN ORGANIZED HEALTH CARE EDUCATION/TRAINING PROGRAM | Admitting: INTERNAL MEDICINE
Payer: MEDICARE

## 2024-02-17 DIAGNOSIS — N30.00 ACUTE CYSTITIS WITHOUT HEMATURIA: ICD-10-CM

## 2024-02-17 DIAGNOSIS — R42 DIZZINESS: ICD-10-CM

## 2024-02-17 DIAGNOSIS — R19.7 NAUSEA VOMITING AND DIARRHEA: Primary | ICD-10-CM

## 2024-02-17 DIAGNOSIS — R11.2 NAUSEA VOMITING AND DIARRHEA: Primary | ICD-10-CM

## 2024-02-17 DIAGNOSIS — E86.0 DEHYDRATION: ICD-10-CM

## 2024-02-17 LAB
ALBUMIN SERPL-MCNC: 4.3 G/DL (ref 3.4–4.8)
ALBUMIN/GLOB SERPL: 1.3 RATIO (ref 1.1–2)
ALP SERPL-CCNC: 73 UNIT/L (ref 40–150)
ALT SERPL-CCNC: 23 UNIT/L (ref 0–55)
APPEARANCE UR: CLEAR
AST SERPL-CCNC: 40 UNIT/L (ref 5–34)
BACTERIA #/AREA URNS AUTO: ABNORMAL /HPF
BASE EXCESS BLD CALC-SCNC: -6.6 MMOL/L
BASOPHILS # BLD AUTO: 0.01 X10(3)/MCL
BASOPHILS NFR BLD AUTO: 0.2 %
BILIRUB SERPL-MCNC: 0.7 MG/DL
BILIRUB UR QL STRIP.AUTO: NEGATIVE
BLOOD GAS SAMPLE TYPE (OHS): ABNORMAL
BUN SERPL-MCNC: 10 MG/DL (ref 9.8–20.1)
CA-I BLD-SCNC: 1.03 MMOL/L (ref 1.12–1.23)
CALCIUM SERPL-MCNC: 9.7 MG/DL (ref 8.4–10.2)
CHLORIDE SERPL-SCNC: 97 MMOL/L (ref 98–107)
CO2 BLDA-SCNC: 20.3 MMOL/L
CO2 SERPL-SCNC: 20 MMOL/L (ref 23–31)
COHGB MFR BLDA: 1.3 %
COLOR UR AUTO: ABNORMAL
CREAT SERPL-MCNC: 0.62 MG/DL (ref 0.55–1.02)
DRAWN BY BLOOD GAS (OHS): ABNORMAL
EOSINOPHIL # BLD AUTO: 0.02 X10(3)/MCL (ref 0–0.9)
EOSINOPHIL NFR BLD AUTO: 0.3 %
ERYTHROCYTE [DISTWIDTH] IN BLOOD BY AUTOMATED COUNT: 11.4 % (ref 11.5–17)
GFR SERPLBLD CREATININE-BSD FMLA CKD-EPI: >60 MLS/MIN/1.73/M2
GLOBULIN SER-MCNC: 3.3 GM/DL (ref 2.4–3.5)
GLUCOSE SERPL-MCNC: 114 MG/DL (ref 82–115)
GLUCOSE UR QL STRIP.AUTO: NORMAL
HCO3 BLDA-SCNC: 19.1 MMOL/L
HCT VFR BLD AUTO: 42.7 % (ref 37–47)
HGB BLD-MCNC: 14.8 G/DL (ref 12–16)
IMM GRANULOCYTES # BLD AUTO: 0.01 X10(3)/MCL (ref 0–0.04)
IMM GRANULOCYTES NFR BLD AUTO: 0.2 %
KETONES UR QL STRIP.AUTO: ABNORMAL
LEUKOCYTE ESTERASE UR QL STRIP.AUTO: NEGATIVE
LIPASE SERPL-CCNC: 11 U/L
LYMPHOCYTES # BLD AUTO: 0.75 X10(3)/MCL (ref 0.6–4.6)
LYMPHOCYTES NFR BLD AUTO: 12.8 %
MCH RBC QN AUTO: 34.4 PG (ref 27–31)
MCHC RBC AUTO-ENTMCNC: 34.7 G/DL (ref 33–36)
MCV RBC AUTO: 99.3 FL (ref 80–94)
METHGB MFR BLDA: 0.6 %
MONOCYTES # BLD AUTO: 0.3 X10(3)/MCL (ref 0.1–1.3)
MONOCYTES NFR BLD AUTO: 5.1 %
MUCOUS THREADS URNS QL MICRO: ABNORMAL /LPF
NEUTROPHILS # BLD AUTO: 4.75 X10(3)/MCL (ref 2.1–9.2)
NEUTROPHILS NFR BLD AUTO: 81.4 %
NITRITE UR QL STRIP.AUTO: ABNORMAL
NRBC BLD AUTO-RTO: 0 %
O2 HB BLOOD GAS (OHS): 59.5 %
OXYHGB MFR BLDA: 12.2 G/DL
PCO2 BLDA: 38 MMHG (ref 20–50)
PH BLDA: 7.31 [PH] (ref 7.3–7.6)
PH UR STRIP.AUTO: 5 [PH]
PLATELET # BLD AUTO: 227 X10(3)/MCL (ref 130–400)
PMV BLD AUTO: 9.4 FL (ref 7.4–10.4)
PO2 BLDA: <38 MMHG
POCT GLUCOSE: 151 MG/DL (ref 70–110)
POCT GLUCOSE: 210 MG/DL (ref 70–110)
POTASSIUM BLOOD GAS (OHS): 4.3 MMOL/L (ref 3.5–5)
POTASSIUM SERPL-SCNC: 4.3 MMOL/L (ref 3.5–5.1)
PROT SERPL-MCNC: 7.6 GM/DL (ref 5.8–7.6)
PROT UR QL STRIP.AUTO: ABNORMAL
RBC # BLD AUTO: 4.3 X10(6)/MCL (ref 4.2–5.4)
RBC #/AREA URNS AUTO: ABNORMAL /HPF
RBC UR QL AUTO: ABNORMAL
SAO2 % BLDA: 60.6 %
SODIUM BLOOD GAS (OHS): 132 MMOL/L (ref 137–145)
SODIUM SERPL-SCNC: 135 MMOL/L (ref 136–145)
SP GR UR STRIP.AUTO: 1.02 (ref 1–1.03)
SQUAMOUS #/AREA URNS LPF: ABNORMAL /HPF
UROBILINOGEN UR STRIP-ACNC: NORMAL
WBC # SPEC AUTO: 5.84 X10(3)/MCL (ref 4.5–11.5)
WBC #/AREA URNS AUTO: ABNORMAL /HPF

## 2024-02-17 PROCEDURE — 80053 COMPREHEN METABOLIC PANEL: CPT | Performed by: NURSE PRACTITIONER

## 2024-02-17 PROCEDURE — 96365 THER/PROPH/DIAG IV INF INIT: CPT

## 2024-02-17 PROCEDURE — 85025 COMPLETE CBC W/AUTO DIFF WBC: CPT | Performed by: NURSE PRACTITIONER

## 2024-02-17 PROCEDURE — 82962 GLUCOSE BLOOD TEST: CPT

## 2024-02-17 PROCEDURE — 96375 TX/PRO/DX INJ NEW DRUG ADDON: CPT

## 2024-02-17 PROCEDURE — 80048 BASIC METABOLIC PNL TOTAL CA: CPT | Mod: XB

## 2024-02-17 PROCEDURE — 99285 EMERGENCY DEPT VISIT HI MDM: CPT | Mod: 25

## 2024-02-17 PROCEDURE — 99900035 HC TECH TIME PER 15 MIN (STAT)

## 2024-02-17 PROCEDURE — 25000003 PHARM REV CODE 250: Performed by: NURSE PRACTITIONER

## 2024-02-17 PROCEDURE — 25500020 PHARM REV CODE 255: Performed by: NURSE PRACTITIONER

## 2024-02-17 PROCEDURE — 63600175 PHARM REV CODE 636 W HCPCS: Performed by: NURSE PRACTITIONER

## 2024-02-17 PROCEDURE — 81001 URINALYSIS AUTO W/SCOPE: CPT | Performed by: NURSE PRACTITIONER

## 2024-02-17 PROCEDURE — 82010 KETONE BODYS QUAN: CPT | Performed by: NURSE PRACTITIONER

## 2024-02-17 PROCEDURE — 83690 ASSAY OF LIPASE: CPT | Performed by: NURSE PRACTITIONER

## 2024-02-17 PROCEDURE — 96361 HYDRATE IV INFUSION ADD-ON: CPT

## 2024-02-17 PROCEDURE — 82803 BLOOD GASES ANY COMBINATION: CPT

## 2024-02-17 RX ORDER — SODIUM CHLORIDE 9 MG/ML
500 INJECTION, SOLUTION INTRAVENOUS
Status: COMPLETED | OUTPATIENT
Start: 2024-02-17 | End: 2024-02-18

## 2024-02-17 RX ORDER — ONDANSETRON HYDROCHLORIDE 2 MG/ML
4 INJECTION, SOLUTION INTRAVENOUS
Status: COMPLETED | OUTPATIENT
Start: 2024-02-17 | End: 2024-02-17

## 2024-02-17 RX ORDER — SODIUM CHLORIDE 9 MG/ML
1000 INJECTION, SOLUTION INTRAVENOUS
Status: COMPLETED | OUTPATIENT
Start: 2024-02-17 | End: 2024-02-17

## 2024-02-17 RX ORDER — SODIUM CHLORIDE 9 MG/ML
500 INJECTION, SOLUTION INTRAVENOUS
Status: COMPLETED | OUTPATIENT
Start: 2024-02-17 | End: 2024-02-17

## 2024-02-17 RX ADMIN — SODIUM CHLORIDE 500 ML: 9 INJECTION, SOLUTION INTRAVENOUS at 06:02

## 2024-02-17 RX ADMIN — SODIUM CHLORIDE 1000 ML: 9 INJECTION, SOLUTION INTRAVENOUS at 08:02

## 2024-02-17 RX ADMIN — SODIUM CHLORIDE 500 ML: 9 INJECTION, SOLUTION INTRAVENOUS at 11:02

## 2024-02-17 RX ADMIN — ONDANSETRON 4 MG: 2 INJECTION INTRAMUSCULAR; INTRAVENOUS at 11:02

## 2024-02-17 RX ADMIN — IOPAMIDOL 100 ML: 755 INJECTION, SOLUTION INTRAVENOUS at 07:02

## 2024-02-17 RX ADMIN — CEFTRIAXONE 1 G: 1 INJECTION, POWDER, FOR SOLUTION INTRAMUSCULAR; INTRAVENOUS at 10:02

## 2024-02-17 NOTE — FIRST PROVIDER EVALUATION
"Medical screening examination initiated.  I have conducted a focused provider triage encounter, findings are as follows:    Brief history of present illness:  67y/o F presents to the ED with diarrhea that started this morning. States abdominal pain with vomiting. Zofran was given in route.     Vitals:    02/17/24 1720   BP: (!) 150/37   Pulse: 97   Resp: 16   Temp: 97.8 °F (36.6 °C)   TempSrc: Temporal   SpO2: 97%   Weight: 52.6 kg (116 lb)   Height: 5' 2" (1.575 m)       Pertinent physical exam:  AAA x 3    Brief workup plan:   Labs    Preliminary workup initiated; this workup will be continued and followed by the physician or advanced practice provider that is assigned to the patient when roomed.  "

## 2024-02-17 NOTE — Clinical Note
Diagnosis: Nausea vomiting and diarrhea [053969]   Future Attending Provider: NICOLA RUIZ [[id number]]   Admit to which facility:: [facility] [[id number]]   Reason for IP Medical Treatment  (Clinical interventions that can only be accomplished in the IP setting? ) :: iv hydration, antibiotics, antiemetics   I certify that Inpatient services for greater than or equal to 2 midnights are medically necessary:: Yes   Plans for Post-Acute care--if anticipated (pick the single best option):: A. No post acute care anticipated at this time

## 2024-02-18 VITALS
WEIGHT: 116 LBS | RESPIRATION RATE: 18 BRPM | TEMPERATURE: 98 F | HEIGHT: 62 IN | DIASTOLIC BLOOD PRESSURE: 75 MMHG | SYSTOLIC BLOOD PRESSURE: 153 MMHG | HEART RATE: 97 BPM | OXYGEN SATURATION: 98 % | BODY MASS INDEX: 21.35 KG/M2

## 2024-02-18 PROBLEM — R19.7 NAUSEA VOMITING AND DIARRHEA: Status: ACTIVE | Noted: 2024-02-18

## 2024-02-18 PROBLEM — R11.2 NAUSEA VOMITING AND DIARRHEA: Status: ACTIVE | Noted: 2024-02-18

## 2024-02-18 LAB
ALBUMIN SERPL-MCNC: 3.1 G/DL (ref 3.4–4.8)
ALBUMIN/GLOB SERPL: 1.3 RATIO (ref 1.1–2)
ALP SERPL-CCNC: 54 UNIT/L (ref 40–150)
ALT SERPL-CCNC: 17 UNIT/L (ref 0–55)
ANION GAP SERPL CALC-SCNC: 16 MMOL/L (ref 8–16)
AST SERPL-CCNC: 28 UNIT/L (ref 5–34)
B-OH-BUTYR SERPL-MCNC: 4.5 MMOL/L
BASOPHILS # BLD AUTO: 0.02 X10(3)/MCL
BASOPHILS NFR BLD AUTO: 0.3 %
BILIRUB SERPL-MCNC: 0.5 MG/DL
BUN SERPL-MCNC: 8.6 MG/DL (ref 9.8–20.1)
BUN SERPL-MCNC: 9 MG/DL (ref 6–30)
CALCIUM SERPL-MCNC: 8 MG/DL (ref 8.4–10.2)
CHLORIDE SERPL-SCNC: 105 MMOL/L (ref 95–110)
CHLORIDE SERPL-SCNC: 106 MMOL/L (ref 98–107)
CO2 SERPL-SCNC: 22 MMOL/L (ref 23–31)
CREAT SERPL-MCNC: 0.3 MG/DL (ref 0.5–1.4)
CREAT SERPL-MCNC: 0.69 MG/DL (ref 0.55–1.02)
EOSINOPHIL # BLD AUTO: 0.04 X10(3)/MCL (ref 0–0.9)
EOSINOPHIL NFR BLD AUTO: 0.6 %
ERYTHROCYTE [DISTWIDTH] IN BLOOD BY AUTOMATED COUNT: 11.9 % (ref 11.5–17)
GFR SERPLBLD CREATININE-BSD FMLA CKD-EPI: >60 MLS/MIN/1.73/M2
GLOBULIN SER-MCNC: 2.4 GM/DL (ref 2.4–3.5)
GLUCOSE SERPL-MCNC: 160 MG/DL (ref 82–115)
GLUCOSE SERPL-MCNC: 243 MG/DL (ref 70–110)
HCT VFR BLD AUTO: 32.6 % (ref 37–47)
HCT VFR BLD CALC: 36 %PCV (ref 36–54)
HGB BLD-MCNC: 11.4 G/DL (ref 12–16)
HGB BLD-MCNC: 12 G/DL
IMM GRANULOCYTES # BLD AUTO: 0.03 X10(3)/MCL (ref 0–0.04)
IMM GRANULOCYTES NFR BLD AUTO: 0.4 %
LYMPHOCYTES # BLD AUTO: 1.34 X10(3)/MCL (ref 0.6–4.6)
LYMPHOCYTES NFR BLD AUTO: 19.5 %
MAGNESIUM SERPL-MCNC: 1.6 MG/DL (ref 1.6–2.6)
MCH RBC QN AUTO: 35 PG (ref 27–31)
MCHC RBC AUTO-ENTMCNC: 35 G/DL (ref 33–36)
MCV RBC AUTO: 100 FL (ref 80–94)
MONOCYTES # BLD AUTO: 0.48 X10(3)/MCL (ref 0.1–1.3)
MONOCYTES NFR BLD AUTO: 7 %
NEUTROPHILS # BLD AUTO: 4.96 X10(3)/MCL (ref 2.1–9.2)
NEUTROPHILS NFR BLD AUTO: 72.2 %
NRBC BLD AUTO-RTO: 0 %
PLATELET # BLD AUTO: 199 X10(3)/MCL (ref 130–400)
PMV BLD AUTO: 9.6 FL (ref 7.4–10.4)
POC IONIZED CALCIUM: 1.15 MMOL/L (ref 1.06–1.42)
POC TCO2 (MEASURED): 17 MMOL/L (ref 23–29)
POTASSIUM BLD-SCNC: 4.7 MMOL/L (ref 3.5–5.1)
POTASSIUM SERPL-SCNC: 4.2 MMOL/L (ref 3.5–5.1)
PROT SERPL-MCNC: 5.5 GM/DL (ref 5.8–7.6)
RBC # BLD AUTO: 3.26 X10(6)/MCL (ref 4.2–5.4)
SAMPLE: ABNORMAL
SODIUM BLD-SCNC: 132 MMOL/L (ref 136–145)
SODIUM SERPL-SCNC: 135 MMOL/L (ref 136–145)
WBC # SPEC AUTO: 6.87 X10(3)/MCL (ref 4.5–11.5)

## 2024-02-18 PROCEDURE — 96361 HYDRATE IV INFUSION ADD-ON: CPT

## 2024-02-18 PROCEDURE — 27000207 HC ISOLATION

## 2024-02-18 PROCEDURE — 83735 ASSAY OF MAGNESIUM: CPT | Performed by: NURSE PRACTITIONER

## 2024-02-18 PROCEDURE — 11000001 HC ACUTE MED/SURG PRIVATE ROOM

## 2024-02-18 PROCEDURE — 80053 COMPREHEN METABOLIC PANEL: CPT | Performed by: NURSE PRACTITIONER

## 2024-02-18 PROCEDURE — 99222 1ST HOSP IP/OBS MODERATE 55: CPT | Mod: ,,, | Performed by: INTERNAL MEDICINE

## 2024-02-18 PROCEDURE — 25000003 PHARM REV CODE 250: Performed by: NURSE PRACTITIONER

## 2024-02-18 PROCEDURE — 93010 ELECTROCARDIOGRAM REPORT: CPT | Mod: ,,, | Performed by: STUDENT IN AN ORGANIZED HEALTH CARE EDUCATION/TRAINING PROGRAM

## 2024-02-18 PROCEDURE — 93005 ELECTROCARDIOGRAM TRACING: CPT

## 2024-02-18 PROCEDURE — 85025 COMPLETE CBC W/AUTO DIFF WBC: CPT | Performed by: NURSE PRACTITIONER

## 2024-02-18 RX ORDER — SODIUM CHLORIDE 9 MG/ML
1000 INJECTION, SOLUTION INTRAVENOUS
Status: COMPLETED | OUTPATIENT
Start: 2024-02-18 | End: 2024-02-18

## 2024-02-18 RX ORDER — ONDANSETRON 4 MG/1
4 TABLET, FILM COATED ORAL EVERY 6 HOURS PRN
Qty: 30 TABLET | Refills: 0 | Status: SHIPPED | OUTPATIENT
Start: 2024-02-18

## 2024-02-18 RX ORDER — NITROFURANTOIN 25; 75 MG/1; MG/1
100 CAPSULE ORAL 2 TIMES DAILY
Qty: 20 CAPSULE | Refills: 0 | Status: ON HOLD | OUTPATIENT
Start: 2024-02-18 | End: 2024-03-03 | Stop reason: ALTCHOICE

## 2024-02-18 RX ORDER — SODIUM CHLORIDE 9 MG/ML
500 INJECTION, SOLUTION INTRAVENOUS
Status: COMPLETED | OUTPATIENT
Start: 2024-02-18 | End: 2024-02-18

## 2024-02-18 RX ORDER — METOCLOPRAMIDE HYDROCHLORIDE 5 MG/ML
10 INJECTION INTRAMUSCULAR; INTRAVENOUS EVERY 6 HOURS PRN
Status: DISCONTINUED | OUTPATIENT
Start: 2024-02-18 | End: 2024-02-18 | Stop reason: HOSPADM

## 2024-02-18 RX ORDER — ONDANSETRON HYDROCHLORIDE 2 MG/ML
4 INJECTION, SOLUTION INTRAVENOUS EVERY 6 HOURS PRN
Status: DISCONTINUED | OUTPATIENT
Start: 2024-02-18 | End: 2024-02-18 | Stop reason: HOSPADM

## 2024-02-18 RX ADMIN — SODIUM CHLORIDE 1000 ML: 9 INJECTION, SOLUTION INTRAVENOUS at 12:02

## 2024-02-18 RX ADMIN — SODIUM CHLORIDE 500 ML: 9 INJECTION, SOLUTION INTRAVENOUS at 12:02

## 2024-02-18 NOTE — ED NOTES
Patient resting on LT side on stretcher. RR nonlabored, NAD. Patient requests anderson crackers et ice water. Provided this for patient. POC Chem8 obtained- noted glucose 243. Obtained Accucheck- 203. ERP updated. Patient states she is wearing her insulin pump. Patient states she is very cold. Afebrile. Call light in reach.

## 2024-02-18 NOTE — H&P
[facility] Hospital Medicine History & Physical Examination       Patient Name: Daisy Calderon  MRN: [id number]  Patient Class: IP- Inpatient   Admission Date: 2/17/2024   Admitting Physician: JUSTINA Service   Length of Stay: 1  Attending Physician: Yana Pearce MD  Primary Care Provider: Yana Pearce MD  Face-to-Face encounter date: 02/18/2024  Code Status:full  Chief Complaint: Diarrhea (C/o diarrhea and vomiting that began this morning. Given 4 mg Zofran IM via EMS. Pt also c/o intermittent abd pain)        Patient information was obtained from patient, patient's family, past medical records and ER records.  ED records were reviewed in detail and documented below    HISTORY OF PRESENT ILLNESS:   Daisy Calderon is a 68 y.o. female who  has a past medical history of Diabetes type I, HTN (hypertension), Hypothyroidism, unspecified, and Tobacco user.. The patient presented to [facility] on 2/17/2024 with a primary complaint of nausea and vomiting    Ms. Villa is a 68-year-old female known to me with type 1 diabetes mellitus on insulin pump that presented with gastroenteritis like symptoms with nausea, vomiting and diarrhea.  She recently had some earache and initially attributed her dizziness to that however with ongoing diarrhea, she did get hypotensive.  She was initially orthostatic in the hospital upon presentation.  At the time of my visit this morning, her diarrhea has seems to have completely resolved.  She does not have anymore dizziness and is not orthostatic.  She was given IV fluids and is feeling much better and eager to go home.  Was able to tolerate her breakfast and has no more nausea however will be discharged home on p.r.n. nausea medications.  Of note she was noted to have a urinary tract infection for which she was given Rocephin in the hospital and will be discharged home on nitrofurantoin.  I will follow-up on cultures closely.    Her other  medical comorbidities remained stable.  PAST MEDICAL HISTORY:     Past Medical History:   Diagnosis Date    Diabetes type I     HTN (hypertension)     Hypothyroidism, unspecified     Tobacco user        PAST SURGICAL HISTORY:     Past Surgical History:   Procedure Laterality Date    CARPAL TUNNEL RELEASE      CATARACT EXTRACTION       SECTION      CHOLECYSTECTOMY      TOTAL ABDOMINAL HYSTERECTOMY      TUBAL LIGATION         ALLERGIES:   Levofloxacin, Oxycodone-aspirin, and Brimonidine    FAMILY HISTORY:   Reviewed and negative    SOCIAL HISTORY:     Social History     Tobacco Use    Smoking status: Every Day     Current packs/day: 0.25     Types: Cigarettes    Smokeless tobacco: Never   Substance Use Topics    Alcohol use: Yes     Alcohol/week: 6.0 standard drinks of alcohol     Types: 6 Cans of beer per week        HOME MEDICATIONS:     Prior to Admission medications    Medication Sig Start Date End Date Taking? Authorizing Provider   insulin aspart U-100 (NOVOLOG) 100 unit/mL injection Inject 67 Units into the skin Daily. Average taken 34 units daily    Provider, Historical   Lactobacillus rhamnosus GG (CULTURELLE) 10 billion cell capsule Take 1 capsule by mouth daily as needed.    Provider, Historical   latanoprost 0.005 % ophthalmic solution Apply 1 drop to eye every evening. 22   Provider, Historical   levothyroxine (SYNTHROID) 150 MCG tablet Take 150 mcg by mouth before breakfast. Mon - Sat no pill on Sun    Provider, Historical   lisinopriL 10 MG tablet Take 10 mg by mouth once daily.    Provider, Historical   loratadine (CLARITIN) 10 mg tablet Take 5 mg by mouth once daily.    Provider, Historical   multivitamin (ONE DAILY MULTIVITAMIN) per tablet Take 1 tablet by mouth once daily.    Provider, Historical   nitrofurantoin, macrocrystal-monohydrate, (MACROBID) 100 MG capsule Take 1 capsule (100 mg total) by mouth 2 (two) times daily. 24   Yana Pearce MD   ondansetron (ZOFRAN) 4 MG  tablet Take 1 tablet (4 mg total) by mouth every 6 (six) hours as needed for Nausea. 2/18/24   Yana Pearce MD   zoledronic acid-mannitol & water (RECLAST) 5 mg/100 mL PgBk Inject 5 mg into the vein once.    Provider, Historical       REVIEW OF SYSTEMS:   Except as documented, all other systems reviewed and negative     PHYSICAL EXAM:     VITAL SIGNS: 24 HRS MIN & MAX LAST   Temp  Min: 97.8 °F (36.6 °C)  Max: 98.4 °F (36.9 °C) 98.4 °F (36.9 °C)   BP  Min: 89/52  Max: 153/75 (!) 153/75   Pulse  Min: 97  Max: 127  97   Resp  Min: 13  Max: 20 18   SpO2  Min: 96 %  Max: 99 % 98 %     General appearance:  Alert and oriented, no acute distress  HENT: Atraumatic head. Moist mucous membranes of oral cavity.  Eyes: Normal extraocular movements.   Neck: Supple.   Lungs: Clear to auscultation bilaterally. No wheezing present.   Heart: Regular rate and rhythm. S1 and S2 present with no murmurs/gallop/rub. No pedal edema. No JVD present.   Abdomen: Soft, non-distended, non-tender. No rebound tenderness/guarding. Bowel sounds are normal.   Extremities: No cyanosis, clubbing, or edema.  Skin: No Rash.   Neuro:   Psych/mental status: Appropriate mood and affect. Responds appropriately to questions.     LABS AND IMAGING:     Recent Labs   Lab 02/17/24 1757 02/17/24 2327 02/18/24  0440   WBC 5.84  --  6.87   RBC 4.30  --  3.26*   HGB 14.8  --  11.4*   HCT 42.7 36 32.6*   MCV 99.3*  --  100.0*   MCH 34.4*  --  35.0*   MCHC 34.7  --  35.0   RDW 11.4*  --  11.9     --  199   MPV 9.4  --  9.6       Recent Labs   Lab 02/17/24 1757 02/17/24 2111 02/18/24  0440   *  --  135*   K 4.3  --  4.2   CO2 20*  --  22*   BUN 10.0  --  8.6*   CREATININE 0.62  --  0.69   CALCIUM 9.7  --  8.0*   PH  --  7.310  --    MG  --   --  1.60   ALBUMIN 4.3  --  3.1*   ALKPHOS 73  --  54   ALT 23  --  17   AST 40*  --  28   BILITOT 0.7  --  0.5       Microbiology Results (last 7 days)       Procedure Component Value Units Date/Time     Stool Culture **CANNOT BE ORDERED STAT** [[id number]]     Order Status: Sent Specimen: Stool     Clostridium Diff Toxin, A & B, EIA [[id number]]     Order Status: Sent Specimen: Stool              CT Abdomen Pelvis With IV Contrast NO Oral Contrast  Narrative: EXAMINATION:  CT ABDOMEN PELVIS WITH IV CONTRAST    CLINICAL HISTORY:  Abdominal pain, acute, nonlocalized; vomiting and diarrhea today    TECHNIQUE:  Multidetector axial images were obtained of the abdomen and pelvis following the administration of IV contrast. Oral contrast was not administered.    Dose length product of 177 mGycm. Automated exposure control was utilized to minimize radiation dose.    COMPARISON:  May 26, 2019.    FINDINGS:  Included portion of the lungs are without suspicious nodularity, acute air space infiltrates or fluid within the pleural spaces.    Liver is remarkable for steatosis without focal space occupying lesion.  Gallbladder is surgically absent. No biliary dilation identified. Pancreatic atrophy without acute peripancreatic phlegmons. Main pancreatic duct is not dilated. Spleen is of normal size without focal lesion.    The adrenal glands appear within normal limits. The kidneys are unremarkable in size and contour. No solid or cystic renal lesion identified. There is no hydronephrosis. No perinephric fluid strandings or collections identified.    There is similar size of hiatal hernia and distal esophageal mural thickening.  There is concentric mural thickening of stomach and the proximal duodenum without surrounding inflammatory changes.  There is also segmental concentric mural thickening of the upper abdomen small bowel segment most apparent on image 71 series 2.  Distal loops of small bowel are without abnormal dilatation and there is no focal or generalized mural thickening.  Appendix is unremarkable.  Colonic fecal loading without abnormal dilatation or pericolonic acute strandings.   No bowel obstruction.  No free  fluid.    There is urinary bladder mural thickening which was also evident on previous exam consistent with cystitis.  Uterus is not present.  No pelvic free fluid..    There is stable chronic compression deformity along the superior endplate of T12.  No acute or aggressive skeletal abnormality.  Impression: 1. Concentric mural thickening of the stomach, proximal duodenum and right upper abdomen segmental small bowel loops consistent with gastroenteritis.  Follow-up exams are recommended to ensure these findings improve and resolve.    2.  Hiatal hernia and distal esophageal mural thickening which may be the result of reflux disease or related to esophagitis.    3.  Urinary bladder wall thickening is consistent with cystitis..    Electronically signed by: Reese Ness  Date:    02/17/2024  Time:    20:17      ASSESSMENT & PLAN:     Gastroenteritis  -symptoms are much improved with symptomatic treatment   -no more complaints of vomiting or diarrhea  -eager to go home and will be discharged home on p.r.n. antiemetics and okay to use Imodium p.r.n. diarrhea     2. IV VD   -initially orthostatic, now improved with IV fluid resuscitation   -emphasized on importance of hydration     3. Type 1 diabetes mellitus   -on insulin pump, resume  -once again emphasized on importance of hydration     4. UTI, present on admission   -received Rocephin in the hospital, discharging home on nitrofurantoin for 10 days   -will follow cultures closely and tailor antibiotics as appropriate           Patient condition:  Stable  __________________________________________________________________________  INPATIENT LIST OF MEDICATIONS     Scheduled Meds:  Continuous Infusions:  PRN Meds:.metoclopramide, ondansetron        If patient was admitted under observational status it is with my approval/permission.        All diagnosis and differential diagnosis have been reviewed; assessment and plan has been documented; I have personally reviewed the  labs and test results that are presently available; I have reviewed the patients medication list; I have reviewed the consulting providers response and recommendations. I have reviewed or attempted to review medical records based upon their availability.    All of the patient and family questions have been addressed and answered. Patient's is agreeable to the above stated plan. I will continue to monitor closely and make adjustments to medical management as needed.    If patient was admitted under observational status it is with my approval/permission.      Yana Pearce MD   02/18/2024

## 2024-02-18 NOTE — DISCHARGE SUMMARY
[facility] Medicine Discharge Summary    Admit Date: 2/17/2024  Discharge Date and Time: 2/18/20249:26 AM  Admitting Physician:  Team  Discharging Physician: Yana Pearce MD.  Primary Care Physician: Yana Pearce MD  Consults: None    Discharge Diagnoses:  GAstroenteritis    Hospital Course:   Refer to HPI on admit note from 02/18/2024      Pt was seen and examined on the day of discharge  Vitals:  VITAL SIGNS: 24 HRS MIN & MAX LAST   Temp  Min: 97.8 °F (36.6 °C)  Max: 98.4 °F (36.9 °C) 98.4 °F (36.9 °C)   BP  Min: 89/52  Max: 153/75 (!) 153/75   Pulse  Min: 97  Max: 127  97   Resp  Min: 13  Max: 20 18   SpO2  Min: 96 %  Max: 99 % 98 %       Physical Exam:  General appearance:  Alert and oriented, no acute distress  HENT: Atraumatic head. Moist mucous membranes of oral cavity.  Eyes: Normal extraocular movements.   Neck: Supple.   Lungs: Clear to auscultation bilaterally. No wheezing present.   Heart: Regular rate and rhythm. S1 and S2 present with no murmurs/gallop/rub. No pedal edema. No JVD present.   Abdomen: Soft, non-distended, non-tender. No rebound tenderness/guarding. Bowel sounds are normal.   Extremities: No cyanosis, clubbing, or edema.  Skin: No Rash.   Neuro:   Psych/mental status: Appropriate mood and affect. Responds appropriately to questions    Procedures Performed: No admission procedures for hospital encounter.     Significant Diagnostic Studies: See Full reports for all details    Recent Labs   Lab 02/17/24 1757 02/17/24  2327 02/18/24  0440   WBC 5.84  --  6.87   RBC 4.30  --  3.26*   HGB 14.8  --  11.4*   HCT 42.7 36 32.6*   MCV 99.3*  --  100.0*   MCH 34.4*  --  35.0*   MCHC 34.7  --  35.0   RDW 11.4*  --  11.9     --  199   MPV 9.4  --  9.6       Recent Labs   Lab 02/17/24  1757 02/17/24 2111 02/18/24  0440   *  --  135*   K 4.3  --  4.2   CO2 20*  --  22*   BUN 10.0  --  8.6*   CREATININE 0.62  --  0.69   CALCIUM  9.7  --  8.0*   PH  --  7.310  --    MG  --   --  1.60   ALBUMIN 4.3  --  3.1*   ALKPHOS 73  --  54   ALT 23  --  17   AST 40*  --  28   BILITOT 0.7  --  0.5        Microbiology Results (last 7 days)       Procedure Component Value Units Date/Time    Stool Culture **CANNOT BE ORDERED STAT** [[id number]]     Order Status: Sent Specimen: Stool     Clostridium Diff Toxin, A & B, EIA [[id number]]     Order Status: Sent Specimen: Stool              CT Abdomen Pelvis With IV Contrast NO Oral Contrast  Narrative: EXAMINATION:  CT ABDOMEN PELVIS WITH IV CONTRAST    CLINICAL HISTORY:  Abdominal pain, acute, nonlocalized; vomiting and diarrhea today    TECHNIQUE:  Multidetector axial images were obtained of the abdomen and pelvis following the administration of IV contrast. Oral contrast was not administered.    Dose length product of 177 mGycm. Automated exposure control was utilized to minimize radiation dose.    COMPARISON:  May 26, 2019.    FINDINGS:  Included portion of the lungs are without suspicious nodularity, acute air space infiltrates or fluid within the pleural spaces.    Liver is remarkable for steatosis without focal space occupying lesion.  Gallbladder is surgically absent. No biliary dilation identified. Pancreatic atrophy without acute peripancreatic phlegmons. Main pancreatic duct is not dilated. Spleen is of normal size without focal lesion.    The adrenal glands appear within normal limits. The kidneys are unremarkable in size and contour. No solid or cystic renal lesion identified. There is no hydronephrosis. No perinephric fluid strandings or collections identified.    There is similar size of hiatal hernia and distal esophageal mural thickening.  There is concentric mural thickening of stomach and the proximal duodenum without surrounding inflammatory changes.  There is also segmental concentric mural thickening of the upper abdomen small bowel segment most apparent on image 71 series 2.  Distal loops  of small bowel are without abnormal dilatation and there is no focal or generalized mural thickening.  Appendix is unremarkable.  Colonic fecal loading without abnormal dilatation or pericolonic acute strandings.   No bowel obstruction.  No free fluid.    There is urinary bladder mural thickening which was also evident on previous exam consistent with cystitis.  Uterus is not present.  No pelvic free fluid..    There is stable chronic compression deformity along the superior endplate of T12.  No acute or aggressive skeletal abnormality.  Impression: 1. Concentric mural thickening of the stomach, proximal duodenum and right upper abdomen segmental small bowel loops consistent with gastroenteritis.  Follow-up exams are recommended to ensure these findings improve and resolve.    2.  Hiatal hernia and distal esophageal mural thickening which may be the result of reflux disease or related to esophagitis.    3.  Urinary bladder wall thickening is consistent with cystitis..    Electronically signed by: Reese Ness  Date:    02/17/2024  Time:    20:17         Medication List        START taking these medications      nitrofurantoin (macrocrystal-monohydrate) 100 MG capsule  Commonly known as: MACROBID  Take 1 capsule (100 mg total) by mouth 2 (two) times daily.     ondansetron 4 MG tablet  Commonly known as: ZOFRAN  Take 1 tablet (4 mg total) by mouth every 6 (six) hours as needed for Nausea.            CONTINUE taking these medications      insulin aspart U-100 100 unit/mL injection  Commonly known as: NovoLOG     Lactobacillus rhamnosus GG 10 billion cell capsule  Commonly known as: CULTURELLE     latanoprost 0.005 % ophthalmic solution     levothyroxine 150 MCG tablet  Commonly known as: SYNTHROID     lisinopriL 10 MG tablet     loratadine 10 mg tablet  Commonly known as: CLARITIN     ONE DAILY MULTIVITAMIN per tablet  Generic drug: multivitamin     zoledronic acid-mannitol & water 5 mg/100 mL Pgbk  Commonly known as:  RECLAST               Where to Get Your Medications        These medications were sent to [facility]/pharmacy #[id number] - [place] - [address]  [address] [address]      Phone: [phone]   nitrofurantoin (macrocrystal-monohydrate) 100 MG capsule  ondansetron 4 MG tablet          Explained in detail to the patient about the discharge plan, medications, and follow-up visits. Pt understands and agrees with the treatment plan  Discharge Disposition: Home or Self Care   Discharged Condition: stable  Diet- as tolerated  Dietary Orders (From admission, onward)       Start     Ordered    02/18/24 0144  Diet diabetic  Diet effective now         02/18/24 0143                   Medications Per DC med rec  Activities as tolerated    For further questions contact Dr Quach's office    Discharge time 33 minutes    For worsening symptoms, chest pain, shortness of breath, increased abdominal pain, high grade fever, stroke or stroke like symptoms, immediately go to the nearest Emergency Room or call 911 as soon as possible.      Yana Menard M.D on 2/18/2024. at 9:26 AM.

## 2024-02-18 NOTE — ED PROVIDER NOTES
Encounter Date: 2024       History     Chief Complaint   Patient presents with    Diarrhea     C/o diarrhea and vomiting that began this morning. Given 4 mg Zofran IM via EMS. Pt also c/o intermittent abd pain     See MDM    The history is provided by the patient. No  was used.     Review of patient's allergies indicates:   Allergen Reactions    Levofloxacin      Other reaction(s): itching, swelling    Oxycodone-aspirin     Brimonidine Nausea And Vomiting     Past Medical History:   Diagnosis Date    Diabetes type I     HTN (hypertension)     Hypothyroidism, unspecified     Tobacco user      Past Surgical History:   Procedure Laterality Date    CARPAL TUNNEL RELEASE      CATARACT EXTRACTION       SECTION      CHOLECYSTECTOMY      TOTAL ABDOMINAL HYSTERECTOMY      TUBAL LIGATION       No family history on file.  Social History     Tobacco Use    Smoking status: Every Day     Current packs/day: 0.25     Types: Cigarettes    Smokeless tobacco: Never   Substance Use Topics    Alcohol use: Yes     Alcohol/week: 6.0 standard drinks of alcohol     Types: 6 Cans of beer per week     Review of Systems   Gastrointestinal:  Positive for abdominal pain, diarrhea, nausea and vomiting.   All other systems reviewed and are negative.      Physical Exam     Initial Vitals [24 1720]   BP Pulse Resp Temp SpO2   (!) 150/37 97 16 97.8 °F (36.6 °C) 97 %      MAP       --         Physical Exam    Nursing note and vitals reviewed.  Constitutional: She appears well-developed and well-nourished.   Cardiovascular:  Regular rhythm and normal heart sounds.   Tachycardia present.         Pulmonary/Chest: Breath sounds normal. No respiratory distress.   Abdominal: Abdomen is soft. She exhibits no distension. There is abdominal tenderness.     Neurological: She is alert and oriented to person, place, and time.   Skin: Skin is warm and dry.   Psychiatric: She has a normal mood and affect.         ED Course    Procedures  Labs Reviewed   COMPREHENSIVE METABOLIC PANEL - Abnormal; Notable for the following components:       Result Value    Sodium Level 135 (*)     Chloride 97 (*)     Carbon Dioxide 20 (*)     Aspartate Aminotransferase 40 (*)     All other components within normal limits   URINALYSIS, REFLEX TO URINE CULTURE - Abnormal; Notable for the following components:    Protein, UA 1+ (*)     Ketones, UA 3+ (*)     Blood, UA 2+ (*)     Nitrites, UA 2+ (*)     Bacteria, UA Occasional (*)     Mucous, UA Trace (*)     All other components within normal limits   CBC WITH DIFFERENTIAL - Abnormal; Notable for the following components:    MCV 99.3 (*)     MCH 34.4 (*)     RDW 11.4 (*)     All other components within normal limits   BLOOD GAS - Abnormal; Notable for the following components:    Sodium, Blood Gas 132 (*)     Calcium Level Ionized 1.03 (*)     All other components within normal limits   BETA - HYDROXYBUTYRATE, SERUM - Abnormal; Notable for the following components:    Beta Hydroxybutyrate 4.50 (*)     All other components within normal limits   CBC WITH DIFFERENTIAL - Abnormal; Notable for the following components:    RBC 3.26 (*)     Hgb 11.4 (*)     Hct 32.6 (*)     .0 (*)     MCH 35.0 (*)     All other components within normal limits   COMPREHENSIVE METABOLIC PANEL - Abnormal; Notable for the following components:    Sodium Level 135 (*)     Carbon Dioxide 22 (*)     Glucose Level 160 (*)     Blood Urea Nitrogen 8.6 (*)     Calcium Level Total 8.0 (*)     Protein Total 5.5 (*)     Albumin Level 3.1 (*)     All other components within normal limits   POCT GLUCOSE - Abnormal; Notable for the following components:    POCT Glucose 151 (*)     All other components within normal limits   POCT GLUCOSE - Abnormal; Notable for the following components:    POCT Glucose 210 (*)     All other components within normal limits   ISTAT CHEM8 - Abnormal; Notable for the following components:    POC Glucose 243 (*)      POC Creatinine 0.3 (*)     POC Sodium 132 (*)     POC TCO2 (MEASURED) 17 (*)     All other components within normal limits   LIPASE - Normal   MAGNESIUM - Normal   CBC W/ AUTO DIFFERENTIAL    Narrative:     The following orders were created for panel order CBC Auto Differential.  Procedure                               Abnormality         Status                     ---------                               -----------         ------                     CBC with Differential[[id number]]       Abnormal            Final result                 Please view results for these tests on the individual orders.     EKG Readings: (Independently Interpreted)   Initial Reading: No STEMI. Rhythm: Sinus Tachycardia. Heart Rate: 111. Ectopy: No Ectopy. Conduction: Normal. ST Segments: Normal ST Segments. T Waves: Normal. Clinical Impression: Sinus Tachycardia       Imaging Results              CT Abdomen Pelvis With IV Contrast NO Oral Contrast (Final result)  Result time 02/17/24 20:17:16      Final result by Reese Ness MD (02/17/24 20:17:16)                   Impression:      1. Concentric mural thickening of the stomach, proximal duodenum and right upper abdomen segmental small bowel loops consistent with gastroenteritis.  Follow-up exams are recommended to ensure these findings improve and resolve.    2.  Hiatal hernia and distal esophageal mural thickening which may be the result of reflux disease or related to esophagitis.    3.  Urinary bladder wall thickening is consistent with cystitis..      Electronically signed by: Reese Ness  Date:    02/17/2024  Time:    20:17               Narrative:    EXAMINATION:  CT ABDOMEN PELVIS WITH IV CONTRAST    CLINICAL HISTORY:  Abdominal pain, acute, nonlocalized; vomiting and diarrhea today    TECHNIQUE:  Multidetector axial images were obtained of the abdomen and pelvis following the administration of IV contrast. Oral contrast was not administered.    Dose length product of 177  mGycm. Automated exposure control was utilized to minimize radiation dose.    COMPARISON:  May 26, 2019.    FINDINGS:  Included portion of the lungs are without suspicious nodularity, acute air space infiltrates or fluid within the pleural spaces.    Liver is remarkable for steatosis without focal space occupying lesion.  Gallbladder is surgically absent. No biliary dilation identified. Pancreatic atrophy without acute peripancreatic phlegmons. Main pancreatic duct is not dilated. Spleen is of normal size without focal lesion.    The adrenal glands appear within normal limits. The kidneys are unremarkable in size and contour. No solid or cystic renal lesion identified. There is no hydronephrosis. No perinephric fluid strandings or collections identified.    There is similar size of hiatal hernia and distal esophageal mural thickening.  There is concentric mural thickening of stomach and the proximal duodenum without surrounding inflammatory changes.  There is also segmental concentric mural thickening of the upper abdomen small bowel segment most apparent on image 71 series 2.  Distal loops of small bowel are without abnormal dilatation and there is no focal or generalized mural thickening.  Appendix is unremarkable.  Colonic fecal loading without abnormal dilatation or pericolonic acute strandings.   No bowel obstruction.  No free fluid.    There is urinary bladder mural thickening which was also evident on previous exam consistent with cystitis.  Uterus is not present.  No pelvic free fluid..    There is stable chronic compression deformity along the superior endplate of T12.  No acute or aggressive skeletal abnormality.                                       Medications   0.9%  NaCl infusion (0 mLs Intravenous Stopped 2/17/24 2009)   0.9%  NaCl infusion (0 mLs Intravenous Stopped 2/17/24 2131)   iopamidoL (ISOVUE-370) injection 100 mL (100 mLs Intravenous Given 2/17/24 1951)   cefTRIAXone (Rocephin) 1 g in  dextrose 5 % in water (D5W) 100 mL IVPB (MB+) (0 g Intravenous Stopped 2/17/24 2304)   ondansetron injection 4 mg (4 mg Intravenous Given 2/17/24 2335)   0.9%  NaCl infusion (0 mLs Intravenous Stopped 2/18/24 0028)   0.9%  NaCl infusion (0 mLs Intravenous Stopped 2/18/24 0149)   0.9%  NaCl infusion (0 mLs Intravenous Stopped 2/18/24 0818)     Medical Decision Making  68-year-old type 1 diabetic who has an insulin pump on presents with complaint of nausea and vomiting multiple times today along with looser stools x3 today.  She is having diffuse abdominal pain.  No fever.    Lab work shows no leukocytosis.  Glucose 114.  Anion gap 18 initially with a CO2 of 20.  Urinalysis has 3+ ketones along with nitrites 2+ and some bacteria.  No white blood cells no leukestrace.  IV hydration initiated.  CT ordered for abdominal pain    CT abdomen pelvis shows concentric mural thickening of the stomach, proximal duodenum and right upper abdomen segmental small bowel loops consistent with gastroenteritis there was also bladder wall thickening consistent with cystitis.  There was also a hiatal hernia and some esophagitis consistent with possible GERD.    With CT findings along with urinalysis gave Rocephin 1 g here in the emergency room.  Patient did tolerate a few sips of water but take it nauseated.  Zofran given and will try some crackers.  After the additional IV fluids repeat Chem 8 shows anion gap now 10.  Her glucose did elevate some she is wearing her insulin pump so we are making adjustments there.  She is feeling somewhat improved.  We will aim to discharge home with antibiotics for her urinary tract infection and nausea medication for her nausea vomiting.  Encouraged hydration.  Likely gastroenteritis that will need to run its course treat urine with antibiotics    +orthostatic. Dropped to 80's systolic. Felt dizzy. Will give another 500ml bolus then maintenance. Will plan to admit for hydration.     Amount and/or  Complexity of Data Reviewed  Labs: ordered. Decision-making details documented in ED Course.  Radiology: ordered. Decision-making details documented in ED Course.  Discussion of management or test interpretation with external provider(s): Discussed with dr. Reyna regarding admit. He had face to face with patient.     Risk  Prescription drug management.  Decision regarding hospitalization.      Additional MDM:   Differential Diagnosis:   Other: The following diagnoses were also considered and will be evaluated: UTI, dehydration and DKA.            ED Course as of 02/18/24 1734   Sun Feb 18, 2024   0000 Upon re-examination patient states she did eat the anderson cracker and doesn't feel nauseated but hasn't had anything else to drink beause she didn't want to over do it. Discussed plan for nausea meds and antibiotics and discussed discharge. She now states she feels dizzy when she moves in bed and has felt this way since earlier. I have not been aware of this the entire time she has been here. Will get orthostatics and re-evaluate.  [EV]   0145 Spoke with dr espinoza, will admit [NL]      ED Course User Index  [EV] Giovana Benjamin FNP  [NL] Mahamed Paez MD                           Clinical Impression:  Final diagnoses:  [R42] Dizziness  [R11.2, R19.7] Nausea vomiting and diarrhea (Primary)  [E86.0] Dehydration  [N30.00] Acute cystitis without hematuria          ED Disposition Condition    Discharge           ED Prescriptions       Medication Sig Dispense Start Date End Date Auth. Provider    nitrofurantoin, macrocrystal-monohydrate, (MACROBID) 100 MG capsule Take 1 capsule (100 mg total) by mouth 2 (two) times daily. 20 capsule 2/18/2024 -- Yana Espinoza MD    ondansetron (ZOFRAN) 4 MG tablet Take 1 tablet (4 mg total) by mouth every 6 (six) hours as needed for Nausea. 30 tablet 2/18/2024 -- Yana Espinoza MD          Follow-up Information    None          Giovana Benjamin FNP  02/18/24  0111       Giovana Benjamin, [facility]  02/18/24 0113       Giovana Benjamin, [facility]  02/18/24 1736

## 2024-02-19 LAB
OHS QRS DURATION: 84 MS
OHS QTC CALCULATION: 478 MS

## 2024-03-02 ENCOUNTER — HOSPITAL ENCOUNTER (INPATIENT)
Facility: HOSPITAL | Age: 69
LOS: 3 days | Discharge: HOME | DRG: 638 | End: 2024-03-05
Attending: STUDENT IN AN ORGANIZED HEALTH CARE EDUCATION/TRAINING PROGRAM | Admitting: INTERNAL MEDICINE
Payer: MEDICARE

## 2024-03-02 DIAGNOSIS — R11.2 NAUSEA AND VOMITING: ICD-10-CM

## 2024-03-02 DIAGNOSIS — E87.20 ACIDOSIS: ICD-10-CM

## 2024-03-02 DIAGNOSIS — E10.10 TYPE 1 DIABETES MELLITUS WITH KETOACIDOSIS WITHOUT COMA: Primary | ICD-10-CM

## 2024-03-02 DIAGNOSIS — R07.9 CHEST PAIN: ICD-10-CM

## 2024-03-02 DIAGNOSIS — R73.9 HYPERGLYCEMIA: ICD-10-CM

## 2024-03-02 LAB
ALBUMIN SERPL-MCNC: 3.9 G/DL (ref 3.4–4.8)
ALBUMIN/GLOB SERPL: 1.6 RATIO (ref 1.1–2)
ALLENS TEST BLOOD GAS (OHS): YES
ALP SERPL-CCNC: 56 UNIT/L (ref 40–150)
ALT SERPL-CCNC: 25 UNIT/L (ref 0–55)
ANION GAP SERPL CALC-SCNC: 20 MEQ/L
AST SERPL-CCNC: 27 UNIT/L (ref 5–34)
B-OH-BUTYR SERPL-MCNC: 11.3 MMOL/L
BASE EXCESS BLD CALC-SCNC: -19.9 MMOL/L (ref -2–2)
BASOPHILS # BLD AUTO: 0.04 X10(3)/MCL
BASOPHILS NFR BLD AUTO: 0.3 %
BILIRUB SERPL-MCNC: 0.5 MG/DL
BLOOD GAS SAMPLE TYPE (OHS): ABNORMAL
BUN SERPL-MCNC: 17.7 MG/DL (ref 9.8–20.1)
BUN SERPL-MCNC: 19.8 MG/DL (ref 9.8–20.1)
CA-I BLD-SCNC: 1.26 MMOL/L (ref 1.12–1.23)
CALCIUM SERPL-MCNC: 8.3 MG/DL (ref 8.4–10.2)
CALCIUM SERPL-MCNC: 9.3 MG/DL (ref 8.4–10.2)
CHLORIDE SERPL-SCNC: 100 MMOL/L (ref 98–107)
CHLORIDE SERPL-SCNC: 106 MMOL/L (ref 98–107)
CO2 BLDA-SCNC: 8.5 MMOL/L
CO2 SERPL-SCNC: 9 MMOL/L (ref 23–31)
CO2 SERPL-SCNC: 9 MMOL/L (ref 23–31)
COHGB MFR BLDA: 1.9 % (ref 0.5–1.5)
CREAT SERPL-MCNC: 0.97 MG/DL (ref 0.55–1.02)
CREAT SERPL-MCNC: 1.02 MG/DL (ref 0.55–1.02)
CREAT/UREA NIT SERPL: 20
DRAWN BY BLOOD GAS (OHS): ABNORMAL
EOSINOPHIL # BLD AUTO: 0.06 X10(3)/MCL (ref 0–0.9)
EOSINOPHIL NFR BLD AUTO: 0.4 %
ERYTHROCYTE [DISTWIDTH] IN BLOOD BY AUTOMATED COUNT: 11.9 % (ref 11.5–17)
GFR SERPLBLD CREATININE-BSD FMLA CKD-EPI: 60 MLS/MIN/1.73/M2
GFR SERPLBLD CREATININE-BSD FMLA CKD-EPI: >60 MLS/MIN/1.73/M2
GLOBULIN SER-MCNC: 2.5 GM/DL (ref 2.4–3.5)
GLUCOSE SERPL-MCNC: 262 MG/DL (ref 82–115)
GLUCOSE SERPL-MCNC: 369 MG/DL (ref 82–115)
HCO3 BLDA-SCNC: 7.8 MMOL/L (ref 22–26)
HCT VFR BLD AUTO: 41.3 % (ref 37–47)
HGB BLD-MCNC: 13.6 G/DL (ref 12–16)
IMM GRANULOCYTES # BLD AUTO: 0.13 X10(3)/MCL (ref 0–0.04)
IMM GRANULOCYTES NFR BLD AUTO: 0.8 %
INHALED O2 CONCENTRATION: 21 %
LACTATE SERPL-SCNC: 1.6 MMOL/L (ref 0.5–2.2)
LIPASE SERPL-CCNC: 9 U/L
LYMPHOCYTES # BLD AUTO: 0.71 X10(3)/MCL (ref 0.6–4.6)
LYMPHOCYTES NFR BLD AUTO: 4.6 %
MAGNESIUM SERPL-MCNC: 1.7 MG/DL (ref 1.6–2.6)
MCH RBC QN AUTO: 34.3 PG (ref 27–31)
MCHC RBC AUTO-ENTMCNC: 32.9 G/DL (ref 33–36)
MCV RBC AUTO: 104.3 FL (ref 80–94)
METHGB MFR BLDA: 0.8 % (ref 0.4–1.5)
MONOCYTES # BLD AUTO: 0.48 X10(3)/MCL (ref 0.1–1.3)
MONOCYTES NFR BLD AUTO: 3.1 %
NEUTROPHILS # BLD AUTO: 14.06 X10(3)/MCL (ref 2.1–9.2)
NEUTROPHILS NFR BLD AUTO: 90.8 %
NRBC BLD AUTO-RTO: 0 %
O2 HB BLOOD GAS (OHS): 94.5 % (ref 94–97)
OHS QRS DURATION: 90 MS
OHS QTC CALCULATION: 472 MS
OXYHGB MFR BLDA: 12.8 G/DL (ref 12–16)
PCO2 BLDA: 24 MMHG (ref 35–45)
PH BLDA: 7.12 [PH] (ref 7.35–7.45)
PLATELET # BLD AUTO: 328 X10(3)/MCL (ref 130–400)
PMV BLD AUTO: 10.1 FL (ref 7.4–10.4)
PO2 BLDA: 98 MMHG (ref 80–100)
POCT GLUCOSE: 161 MG/DL (ref 70–110)
POCT GLUCOSE: 212 MG/DL (ref 70–110)
POCT GLUCOSE: 263 MG/DL (ref 70–110)
POCT GLUCOSE: 353 MG/DL (ref 70–110)
POTASSIUM BLOOD GAS (OHS): 4.9 MMOL/L (ref 3.5–5)
POTASSIUM SERPL-SCNC: 4.1 MMOL/L (ref 3.5–5.1)
POTASSIUM SERPL-SCNC: 4.9 MMOL/L (ref 3.5–5.1)
PROT SERPL-MCNC: 6.4 GM/DL (ref 5.8–7.6)
RBC # BLD AUTO: 3.96 X10(6)/MCL (ref 4.2–5.4)
SAMPLE SITE BLOOD GAS (OHS): ABNORMAL
SAO2 % BLDA: 94.7 %
SODIUM BLOOD GAS (OHS): 127 MMOL/L (ref 137–145)
SODIUM SERPL-SCNC: 133 MMOL/L (ref 136–145)
SODIUM SERPL-SCNC: 135 MMOL/L (ref 136–145)
TROPONIN I SERPL-MCNC: <0.01 NG/ML (ref 0–0.04)
WBC # SPEC AUTO: 15.48 X10(3)/MCL (ref 4.5–11.5)

## 2024-03-02 PROCEDURE — 20000000 HC ICU ROOM

## 2024-03-02 PROCEDURE — 82803 BLOOD GASES ANY COMBINATION: CPT

## 2024-03-02 PROCEDURE — 93005 ELECTROCARDIOGRAM TRACING: CPT

## 2024-03-02 PROCEDURE — 99285 EMERGENCY DEPT VISIT HI MDM: CPT | Mod: 25

## 2024-03-02 PROCEDURE — 25000003 PHARM REV CODE 250: Performed by: STUDENT IN AN ORGANIZED HEALTH CARE EDUCATION/TRAINING PROGRAM

## 2024-03-02 PROCEDURE — 36600 WITHDRAWAL OF ARTERIAL BLOOD: CPT

## 2024-03-02 PROCEDURE — 85025 COMPLETE CBC W/AUTO DIFF WBC: CPT | Performed by: STUDENT IN AN ORGANIZED HEALTH CARE EDUCATION/TRAINING PROGRAM

## 2024-03-02 PROCEDURE — 83605 ASSAY OF LACTIC ACID: CPT | Performed by: STUDENT IN AN ORGANIZED HEALTH CARE EDUCATION/TRAINING PROGRAM

## 2024-03-02 PROCEDURE — 63600175 PHARM REV CODE 636 W HCPCS

## 2024-03-02 PROCEDURE — 83735 ASSAY OF MAGNESIUM: CPT | Performed by: STUDENT IN AN ORGANIZED HEALTH CARE EDUCATION/TRAINING PROGRAM

## 2024-03-02 PROCEDURE — 96374 THER/PROPH/DIAG INJ IV PUSH: CPT

## 2024-03-02 PROCEDURE — 82010 KETONE BODYS QUAN: CPT | Performed by: STUDENT IN AN ORGANIZED HEALTH CARE EDUCATION/TRAINING PROGRAM

## 2024-03-02 PROCEDURE — 11000001 HC ACUTE MED/SURG PRIVATE ROOM

## 2024-03-02 PROCEDURE — 83690 ASSAY OF LIPASE: CPT | Performed by: STUDENT IN AN ORGANIZED HEALTH CARE EDUCATION/TRAINING PROGRAM

## 2024-03-02 PROCEDURE — 63600175 PHARM REV CODE 636 W HCPCS: Performed by: STUDENT IN AN ORGANIZED HEALTH CARE EDUCATION/TRAINING PROGRAM

## 2024-03-02 PROCEDURE — 84484 ASSAY OF TROPONIN QUANT: CPT | Performed by: STUDENT IN AN ORGANIZED HEALTH CARE EDUCATION/TRAINING PROGRAM

## 2024-03-02 PROCEDURE — 96376 TX/PRO/DX INJ SAME DRUG ADON: CPT

## 2024-03-02 PROCEDURE — 99900035 HC TECH TIME PER 15 MIN (STAT)

## 2024-03-02 PROCEDURE — 80053 COMPREHEN METABOLIC PANEL: CPT | Performed by: STUDENT IN AN ORGANIZED HEALTH CARE EDUCATION/TRAINING PROGRAM

## 2024-03-02 PROCEDURE — 96361 HYDRATE IV INFUSION ADD-ON: CPT

## 2024-03-02 RX ORDER — LATANOPROST 50 UG/ML
1 SOLUTION/ DROPS OPHTHALMIC NIGHTLY
Status: DISCONTINUED | OUTPATIENT
Start: 2024-03-02 | End: 2024-03-05 | Stop reason: HOSPADM

## 2024-03-02 RX ORDER — SODIUM CHLORIDE 0.9 % (FLUSH) 0.9 %
10 SYRINGE (ML) INJECTION EVERY 12 HOURS PRN
Status: DISCONTINUED | OUTPATIENT
Start: 2024-03-02 | End: 2024-03-05 | Stop reason: HOSPADM

## 2024-03-02 RX ORDER — POTASSIUM CHLORIDE 7.45 MG/ML
60 INJECTION INTRAVENOUS
Status: DISCONTINUED | OUTPATIENT
Start: 2024-03-02 | End: 2024-03-04

## 2024-03-02 RX ORDER — POTASSIUM CHLORIDE 14.9 MG/ML
60 INJECTION INTRAVENOUS
Status: DISCONTINUED | OUTPATIENT
Start: 2024-03-02 | End: 2024-03-04

## 2024-03-02 RX ORDER — IBUPROFEN 200 MG
16 TABLET ORAL
Status: DISCONTINUED | OUTPATIENT
Start: 2024-03-02 | End: 2024-03-05 | Stop reason: HOSPADM

## 2024-03-02 RX ORDER — DEXTROSE MONOHYDRATE 100 MG/ML
INJECTION, SOLUTION INTRAVENOUS
Status: DISCONTINUED | OUTPATIENT
Start: 2024-03-02 | End: 2024-03-05 | Stop reason: HOSPADM

## 2024-03-02 RX ORDER — SODIUM CHLORIDE 9 MG/ML
1000 INJECTION, SOLUTION INTRAVENOUS CONTINUOUS
Status: ACTIVE | OUTPATIENT
Start: 2024-03-02 | End: 2024-03-03

## 2024-03-02 RX ORDER — NALOXONE HCL 0.4 MG/ML
0.02 VIAL (ML) INJECTION
Status: DISCONTINUED | OUTPATIENT
Start: 2024-03-02 | End: 2024-03-05 | Stop reason: HOSPADM

## 2024-03-02 RX ORDER — ONDANSETRON HYDROCHLORIDE 2 MG/ML
4 INJECTION, SOLUTION INTRAVENOUS
Status: COMPLETED | OUTPATIENT
Start: 2024-03-02 | End: 2024-03-02

## 2024-03-02 RX ORDER — SODIUM CHLORIDE 0.9 % (FLUSH) 0.9 %
10 SYRINGE (ML) INJECTION
Status: DISCONTINUED | OUTPATIENT
Start: 2024-03-02 | End: 2024-03-05 | Stop reason: HOSPADM

## 2024-03-02 RX ORDER — POTASSIUM CHLORIDE 14.9 MG/ML
80 INJECTION INTRAVENOUS
Status: DISCONTINUED | OUTPATIENT
Start: 2024-03-02 | End: 2024-03-04

## 2024-03-02 RX ORDER — POTASSIUM CHLORIDE 7.45 MG/ML
80 INJECTION INTRAVENOUS
Status: DISCONTINUED | OUTPATIENT
Start: 2024-03-02 | End: 2024-03-04

## 2024-03-02 RX ORDER — LISINOPRIL 10 MG/1
10 TABLET ORAL DAILY
Status: DISCONTINUED | OUTPATIENT
Start: 2024-03-03 | End: 2024-03-05 | Stop reason: HOSPADM

## 2024-03-02 RX ORDER — GLUCAGON 1 MG
1 KIT INJECTION
Status: DISCONTINUED | OUTPATIENT
Start: 2024-03-02 | End: 2024-03-05 | Stop reason: HOSPADM

## 2024-03-02 RX ORDER — IBUPROFEN 200 MG
24 TABLET ORAL
Status: DISCONTINUED | OUTPATIENT
Start: 2024-03-02 | End: 2024-03-05 | Stop reason: HOSPADM

## 2024-03-02 RX ORDER — ONDANSETRON HYDROCHLORIDE 2 MG/ML
INJECTION, SOLUTION INTRAVENOUS
Status: COMPLETED
Start: 2024-03-02 | End: 2024-03-02

## 2024-03-02 RX ORDER — DEXTROSE MONOHYDRATE AND SODIUM CHLORIDE 5; .45 G/100ML; G/100ML
INJECTION, SOLUTION INTRAVENOUS CONTINUOUS PRN
Status: DISCONTINUED | OUTPATIENT
Start: 2024-03-02 | End: 2024-03-05 | Stop reason: HOSPADM

## 2024-03-02 RX ORDER — ENOXAPARIN SODIUM 100 MG/ML
40 INJECTION SUBCUTANEOUS EVERY 24 HOURS
Status: DISCONTINUED | OUTPATIENT
Start: 2024-03-02 | End: 2024-03-05 | Stop reason: HOSPADM

## 2024-03-02 RX ORDER — POTASSIUM CHLORIDE 29.8 MG/ML
40 INJECTION INTRAVENOUS
Status: DISCONTINUED | OUTPATIENT
Start: 2024-03-02 | End: 2024-03-04

## 2024-03-02 RX ORDER — MAGNESIUM SULFATE HEPTAHYDRATE 40 MG/ML
2 INJECTION, SOLUTION INTRAVENOUS ONCE
Status: COMPLETED | OUTPATIENT
Start: 2024-03-02 | End: 2024-03-02

## 2024-03-02 RX ORDER — POTASSIUM CHLORIDE 7.45 MG/ML
40 INJECTION INTRAVENOUS
Status: DISCONTINUED | OUTPATIENT
Start: 2024-03-02 | End: 2024-03-04

## 2024-03-02 RX ADMIN — SODIUM CHLORIDE, POTASSIUM CHLORIDE, SODIUM LACTATE AND CALCIUM CHLORIDE 1000 ML: 600; 310; 30; 20 INJECTION, SOLUTION INTRAVENOUS at 05:03

## 2024-03-02 RX ADMIN — ONDANSETRON HYDROCHLORIDE 4 MG: 2 INJECTION, SOLUTION INTRAVENOUS at 07:03

## 2024-03-02 RX ADMIN — ENOXAPARIN SODIUM 40 MG: 40 INJECTION SUBCUTANEOUS at 09:03

## 2024-03-02 RX ADMIN — ONDANSETRON 4 MG: 2 INJECTION INTRAMUSCULAR; INTRAVENOUS at 05:03

## 2024-03-02 RX ADMIN — SODIUM CHLORIDE 1000 ML: 9 INJECTION, SOLUTION INTRAVENOUS at 08:03

## 2024-03-02 RX ADMIN — INSULIN HUMAN 0.1 UNITS/KG/HR: 1 INJECTION, SOLUTION INTRAVENOUS at 07:03

## 2024-03-02 RX ADMIN — MAGNESIUM SULFATE HEPTAHYDRATE 2 G: 40 INJECTION, SOLUTION INTRAVENOUS at 08:03

## 2024-03-02 RX ADMIN — ONDANSETRON 4 MG: 2 INJECTION INTRAMUSCULAR; INTRAVENOUS at 07:03

## 2024-03-02 NOTE — ED PROVIDER NOTES
Encounter Date: 3/2/2024    SCRIBE #1 NOTE: I, Daryl Pringle, am scribing for, and in the presence of,  Rizwan Reyna IV, MD. I have scribed the following portions of the note - the EKG reading. Other sections scribed: HPI, ROS, PE.       History     Chief Complaint   Patient presents with    Vomiting     Presents from home via EMS c/o N/V. HX of DM. EMS reports  enroute      67 y/o female with a hx of DM, HTN, and hypothyroidism presents to the ED for nausea and vomiting beginning today. Pt states this is the third time in the past 2 months that these symptoms have occurred. Pt had a recent diagnosis of a UTI but states her symptoms are not the same as the UTI. Pt's chart notes a recent admission for nausea and vomiting from gastroenteritis. Pt complains of associated symptoms of abdominal pain but denies any fever or chills.     The history is provided by the patient and medical records. No  was used.     Review of patient's allergies indicates:   Allergen Reactions    Levofloxacin      Other reaction(s): itching, swelling    Oxycodone-aspirin     Brimonidine Nausea And Vomiting     Past Medical History:   Diagnosis Date    Diabetes type I     HTN (hypertension)     Hypothyroidism, unspecified     Tobacco user      Past Surgical History:   Procedure Laterality Date    CARPAL TUNNEL RELEASE      CATARACT EXTRACTION       SECTION      CHOLECYSTECTOMY      TOTAL ABDOMINAL HYSTERECTOMY      TUBAL LIGATION       History reviewed. No pertinent family history.  Social History     Tobacco Use    Smoking status: Every Day     Current packs/day: 0.25     Types: Cigarettes    Smokeless tobacco: Never   Substance Use Topics    Alcohol use: Yes     Alcohol/week: 6.0 standard drinks of alcohol     Types: 6 Cans of beer per week     Review of Systems   Constitutional:  Negative for chills and fever.   HENT:  Negative for congestion, rhinorrhea and sore throat.    Eyes:  Negative for visual  disturbance.   Respiratory:  Negative for cough and shortness of breath.    Cardiovascular:  Negative for chest pain and leg swelling.   Gastrointestinal:  Positive for abdominal pain, nausea and vomiting.   Genitourinary:  Negative for dysuria, hematuria, vaginal bleeding and vaginal discharge.   Musculoskeletal:  Negative for joint swelling.   Skin:  Negative for rash.   Neurological:  Negative for weakness.   Psychiatric/Behavioral:  Negative for confusion.        Physical Exam     Initial Vitals [03/02/24 1649]   BP Pulse Resp Temp SpO2   (!) 95/55 94 16 98.4 °F (36.9 °C) 98 %      MAP       --         Physical Exam    Nursing note and vitals reviewed.  Constitutional: She is not diaphoretic. No distress.   HENT:   Head: Normocephalic and atraumatic.   Mouth/Throat: Mucous membranes are dry.   Neck: Neck supple.   Normal range of motion.  Cardiovascular:  Normal rate and regular rhythm.           No murmur heard.  Pulmonary/Chest: Breath sounds normal. No respiratory distress.   Abdominal: Abdomen is soft. She exhibits no distension. There is abdominal tenderness (mild) in the epigastric area.   Musculoskeletal:      Cervical back: Normal range of motion and neck supple.     Neurological: She is alert and oriented to person, place, and time. She has normal strength. No cranial nerve deficit or sensory deficit.   Skin: Skin is warm. Capillary refill takes less than 2 seconds.   Psychiatric: She has a normal mood and affect.         ED Course   Critical Care    Date/Time: 3/2/2024 7:15 PM    Performed by: Rizwan Reyna IV, MD  Authorized by: Rizwan Reyna IV, MD  Total critical care time (exclusive of procedural time) : 36 minutes  Critical care time was exclusive of separately billable procedures and treating other patients.  Critical care was necessary to treat or prevent imminent or life-threatening deterioration of the following conditions: dehydration and metabolic crisis.  Critical care was time spent  personally by me on the following activities: development of treatment plan with patient or surrogate, blood draw for specimens, discussions with consultants, interpretation of cardiac output measurements, evaluation of patient's response to treatment, examination of patient, obtaining history from patient or surrogate, ordering and review of laboratory studies, ordering and performing treatments and interventions, pulse oximetry, ordering and review of radiographic studies, re-evaluation of patient's condition and review of old charts.        Labs Reviewed   COMPREHENSIVE METABOLIC PANEL - Abnormal; Notable for the following components:       Result Value    Sodium Level 133 (*)     Carbon Dioxide 9 (*)     Glucose Level 369 (*)     All other components within normal limits   CBC WITH DIFFERENTIAL - Abnormal; Notable for the following components:    WBC 15.48 (*)     RBC 3.96 (*)     .3 (*)     MCH 34.3 (*)     MCHC 32.9 (*)     Neut # 14.06 (*)     IG# 0.13 (*)     All other components within normal limits   BETA - HYDROXYBUTYRATE, SERUM - Abnormal; Notable for the following components:    Beta Hydroxybutyrate 11.30 (*)     All other components within normal limits   BLOOD GAS - Abnormal; Notable for the following components:    pH, Blood gas 7.120 (*)     pCO2, Blood gas 24.0 (*)     Sodium, Blood Gas 127 (*)     Calcium Level Ionized 1.26 (*)     Base Excess, Blood gas -19.90 (*)     HCO3, Blood gas 7.8 (*)     CO Hgb 1.9 (*)     All other components within normal limits   BASIC METABOLIC PANEL - Abnormal; Notable for the following components:    Sodium Level 135 (*)     Carbon Dioxide 9 (*)     Glucose Level 262 (*)     Calcium Level Total 8.3 (*)     All other components within normal limits   POCT GLUCOSE - Abnormal; Notable for the following components:    POCT Glucose 353 (*)     All other components within normal limits   LACTIC ACID, PLASMA - Normal   LIPASE - Normal   MAGNESIUM - Normal   TROPONIN  I - Normal   CBC W/ AUTO DIFFERENTIAL    Narrative:     The following orders were created for panel order CBC auto differential.  Procedure                               Abnormality         Status                     ---------                               -----------         ------                     CBC with Differential[[id number]]       Abnormal            Final result                 Please view results for these tests on the individual orders.   URINALYSIS, REFLEX TO URINE CULTURE   BASIC METABOLIC PANEL   POCT GLUCOSE MONITORING CONTINUOUS     EKG Readings: (Independently Interpreted)   Initial Reading: No STEMI. Rhythm: Normal Sinus Rhythm. Heart Rate: 94. Ectopy: No Ectopy. Conduction: Normal. ST Segments: Normal ST Segments. T Waves: Normal. Axis: Normal. Clinical Impression: Normal Sinus Rhythm   1710     ECG Results              EKG 12-lead (Final result)        Collection Time Result Time QRS Duration OHS QTC Calculation    03/02/24 17:10:17 03/02/24 18:03:35 90 472                     Final result by Interface, Lab In [facility] (03/02/24 18:03:41)                   Narrative:    Test Reason : R11.2,    Vent. Rate : 094 BPM     Atrial Rate : 094 BPM     P-R Int : 130 ms          QRS Dur : 090 ms      QT Int : 378 ms       P-R-T Axes : 069 052 049 degrees     QTc Int : 472 ms    Normal sinus rhythm  Normal ECG  Confirmed by Simon Madison MD ([id number]) on 3/2/2024 6:03:34 PM    Referred By:             Confirmed By:Simon Madison MD                                  Imaging Results    None          Medications   sodium chloride 0.9% flush 10 mL (has no administration in time range)   0.9%  NaCl infusion (1,000 mLs Intravenous New Bag 3/2/24 2015)   dextrose 5 % and 0.45 % NaCl infusion (has no administration in time range)   dextrose 10 % infusion (has no administration in time range)   dextrose 10 % infusion (has no administration in time range)   insulin regular in 0.9 % NaCl 100 unit/100 mL (1 unit/mL)  infusion (0.1 Units/kg/hr × 47.6 kg Intravenous New Bag 3/2/24 1932)   potassium chloride 10 mEq in 100 mL IVPB (has no administration in time range)     And   potassium chloride 10 mEq in 100 mL IVPB (has no administration in time range)     And   potassium chloride 10 mEq in 100 mL IVPB (has no administration in time range)   potassium chloride 40 mEq in 100 mL IVPB (FOR CENTRAL LINE ADMINISTRATION ONLY) (has no administration in time range)     And   potassium chloride 20 mEq in 100 mL IVPB (FOR CENTRAL LINE ADMINISTRATION ONLY) (has no administration in time range)     And   potassium chloride 20 mEq in 100 mL IVPB (FOR CENTRAL LINE ADMINISTRATION ONLY) (has no administration in time range)   dextrose 10% bolus 125 mL 125 mL (has no administration in time range)   dextrose 10% bolus 250 mL 250 mL (has no administration in time range)   sodium chloride 0.9% flush 10 mL (has no administration in time range)   naloxone 0.4 mg/mL injection 0.02 mg (has no administration in time range)   glucose chewable tablet 16 g (has no administration in time range)   glucose chewable tablet 24 g (has no administration in time range)   glucagon (human recombinant) injection 1 mg (has no administration in time range)   dextrose 10% bolus 125 mL 125 mL (has no administration in time range)   dextrose 10% bolus 250 mL 250 mL (has no administration in time range)   latanoprost 0.005 % ophthalmic solution 1 drop (has no administration in time range)   lisinopriL tablet 10 mg (has no administration in time range)   enoxaparin injection 40 mg (has no administration in time range)   lactated ringers bolus 1,000 mL (0 mLs Intravenous Stopped 3/2/24 1935)   ondansetron injection 4 mg (4 mg Intravenous Given 3/2/24 1723)   insulin regular bolus from bag/infusion 4.76 Units 4.76 mL (4.76 Units Intravenous Bolus from Bag 3/2/24 1933)   magnesium sulfate 2g in water 50mL IVPB (premix) (2 g Intravenous New Bag 3/2/24 2030)   ondansetron  injection 4 mg (4 mg Intravenous Given 3/2/24 1915)     Medical Decision Making  68-year-old female with a history of type 1 diabetes insulin pump dependent presenting with epigastric pain nausea vomiting mildly elevated glucose in 300s on arrival  Patient reports her insulin pump functioning appropriately still with plenty of insulin - however Labs with severe acidosis elevated beta hydroxybutyrate consistent with DKA  Insulin drip started patient admitted to the ICU    Differential diagnosis (includes but is not limited to):   Hyperglycemia, dehydration, jalyn, dka         Problems Addressed:  Hyperglycemia: acute illness or injury that poses a threat to life or bodily functions  Nausea and vomiting: acute illness or injury that poses a threat to life or bodily functions  Type 1 diabetes mellitus with ketoacidosis without coma: acute illness or injury that poses a threat to life or bodily functions    Amount and/or Complexity of Data Reviewed  External Data Reviewed: notes.  Labs: ordered.  ECG/medicine tests: ordered and independent interpretation performed.     Details: No STEMI. Rhythm: Normal Sinus Rhythm. Heart Rate: 94. Ectopy: No Ectopy. Conduction: Normal. ST Segments: Normal ST Segments. T Waves: Normal. Axis: Normal. Clinical Impression: Normal Sinus Rhythm   1710     Discussion of management or test interpretation with external provider(s): Discussed with ICU - will admit     Risk  Prescription drug management.  Decision regarding hospitalization.    Critical Care  Total time providing critical care: 36 minutes            Scribe Attestation:   Scribe #1: I performed the above scribed service and the documentation accurately describes the services I performed. I attest to the accuracy of the note.    Attending Attestation:           Physician Attestation for Scribe:  Physician Attestation Statement for Scribe #1: I, Rizwan Reyna IV, MD, reviewed documentation, as scribed by Daryl Pringle in my presence,  and it is both accurate and complete.             ED Course as of 03/02/24 2244   Sat Mar 02, 2024   1724 68-year-old with a history of diabetes presenting with epigastric pain nausea vomiting  She was admitted for similar symptoms diagnosed with gastroenteritis earlier this month  Patient is well-appearing on exam has some mild epigastric tenderness is not actively vomiting on arrival to ER will treat nausea hydrate obtain labs and reassess [AC]   1911 Patient is acidemic bicarb 9 anion gap 24, beta hydroxy, appears to be in DKA despite insulin pump will discontinue pump and start DKA insulin protocol.  Discussed the case with ICU resident they will admit [AC]   1913 Paged ICU [LH]      ED Course User Index  [AC] Rizwan Reyna IV, MD  [] Daryl Pringle                             Clinical Impression:  Final diagnoses:  [R11.2] Nausea and vomiting  [R73.9] Hyperglycemia  [E10.10] Type 1 diabetes mellitus with ketoacidosis without coma (Primary)          ED Disposition Condition    Admit Stable                Rizwan Reyna IV, MD  03/02/24 1102

## 2024-03-02 NOTE — Clinical Note
Diagnosis: Nausea and vomiting [756203]   Future Attending Provider: REICK SUTTON [[id number]]   Admit to which facility:: [facility] [[id number]]   Reason for IP Medical Treatment  (Clinical interventions that can only be accomplished in the IP setting? ) :: insulin ggt   I certify that Inpatient services for greater than or equal to 2 midnights are medically necessary:: Yes   Plans for Post-Acute care--if anticipated (pick the single best option):: A. No post acute care anticipated at this time   Special Needs:: No Special Needs [1]

## 2024-03-03 LAB
ALBUMIN SERPL-MCNC: 3 G/DL (ref 3.4–4.8)
ALBUMIN SERPL-MCNC: 3.3 G/DL (ref 3.4–4.8)
ALBUMIN/GLOB SERPL: 1.3 RATIO (ref 1.1–2)
ALBUMIN/GLOB SERPL: 1.5 RATIO (ref 1.1–2)
ALP SERPL-CCNC: 41 UNIT/L (ref 40–150)
ALP SERPL-CCNC: 44 UNIT/L (ref 40–150)
ALT SERPL-CCNC: 19 UNIT/L (ref 0–55)
ALT SERPL-CCNC: 22 UNIT/L (ref 0–55)
ANION GAP SERPL CALC-SCNC: 9 MEQ/L
AST SERPL-CCNC: 21 UNIT/L (ref 5–34)
AST SERPL-CCNC: 26 UNIT/L (ref 5–34)
BASOPHILS # BLD AUTO: 0.02 X10(3)/MCL
BASOPHILS # BLD AUTO: 0.03 X10(3)/MCL
BASOPHILS NFR BLD AUTO: 0.1 %
BASOPHILS NFR BLD AUTO: 0.2 %
BILIRUB SERPL-MCNC: 0.3 MG/DL
BILIRUB SERPL-MCNC: 0.4 MG/DL
BUN SERPL-MCNC: 14.8 MG/DL (ref 9.8–20.1)
BUN SERPL-MCNC: 15.9 MG/DL (ref 9.8–20.1)
BUN SERPL-MCNC: 17.4 MG/DL (ref 9.8–20.1)
CALCIUM SERPL-MCNC: 8.1 MG/DL (ref 8.4–10.2)
CALCIUM SERPL-MCNC: 8.2 MG/DL (ref 8.4–10.2)
CALCIUM SERPL-MCNC: 9 MG/DL (ref 8.4–10.2)
CHLORIDE SERPL-SCNC: 103 MMOL/L (ref 98–107)
CHLORIDE SERPL-SCNC: 107 MMOL/L (ref 98–107)
CHLORIDE SERPL-SCNC: 109 MMOL/L (ref 98–107)
CO2 SERPL-SCNC: 14 MMOL/L (ref 23–31)
CO2 SERPL-SCNC: 20 MMOL/L (ref 23–31)
CO2 SERPL-SCNC: 21 MMOL/L (ref 23–31)
CREAT SERPL-MCNC: 0.89 MG/DL (ref 0.55–1.02)
CREAT SERPL-MCNC: 0.94 MG/DL (ref 0.55–1.02)
CREAT SERPL-MCNC: 0.96 MG/DL (ref 0.55–1.02)
CREAT/UREA NIT SERPL: 16
EOSINOPHIL # BLD AUTO: 0.01 X10(3)/MCL (ref 0–0.9)
EOSINOPHIL # BLD AUTO: 0.02 X10(3)/MCL (ref 0–0.9)
EOSINOPHIL NFR BLD AUTO: 0.1 %
EOSINOPHIL NFR BLD AUTO: 0.1 %
ERYTHROCYTE [DISTWIDTH] IN BLOOD BY AUTOMATED COUNT: 12 % (ref 11.5–17)
ERYTHROCYTE [DISTWIDTH] IN BLOOD BY AUTOMATED COUNT: 12 % (ref 11.5–17)
GFR SERPLBLD CREATININE-BSD FMLA CKD-EPI: >60 MLS/MIN/1.73/M2
GLOBULIN SER-MCNC: 2 GM/DL (ref 2.4–3.5)
GLOBULIN SER-MCNC: 2.5 GM/DL (ref 2.4–3.5)
GLUCOSE SERPL-MCNC: 127 MG/DL (ref 70–110)
GLUCOSE SERPL-MCNC: 130 MG/DL (ref 70–110)
GLUCOSE SERPL-MCNC: 139 MG/DL (ref 70–110)
GLUCOSE SERPL-MCNC: 141 MG/DL (ref 82–115)
GLUCOSE SERPL-MCNC: 144 MG/DL (ref 70–110)
GLUCOSE SERPL-MCNC: 149 MG/DL (ref 70–110)
GLUCOSE SERPL-MCNC: 151 MG/DL (ref 70–110)
GLUCOSE SERPL-MCNC: 152 MG/DL (ref 82–115)
GLUCOSE SERPL-MCNC: 154 MG/DL (ref 70–110)
GLUCOSE SERPL-MCNC: 160 MG/DL (ref 70–110)
GLUCOSE SERPL-MCNC: 161 MG/DL (ref 70–110)
GLUCOSE SERPL-MCNC: 165 MG/DL (ref 70–110)
GLUCOSE SERPL-MCNC: 167 MG/DL (ref 82–115)
GLUCOSE SERPL-MCNC: 237 MG/DL (ref 70–110)
GLUCOSE SERPL-MCNC: 240 MG/DL (ref 70–110)
GLUCOSE SERPL-MCNC: 249 MG/DL (ref 70–110)
GLUCOSE SERPL-MCNC: 368 MG/DL (ref 70–110)
GLUCOSE SERPL-MCNC: 434 MG/DL (ref 70–110)
HCT VFR BLD AUTO: 30.4 % (ref 37–47)
HCT VFR BLD AUTO: 32.7 % (ref 37–47)
HGB BLD-MCNC: 10.5 G/DL (ref 12–16)
HGB BLD-MCNC: 11.2 G/DL (ref 12–16)
IMM GRANULOCYTES # BLD AUTO: 0.11 X10(3)/MCL (ref 0–0.04)
IMM GRANULOCYTES # BLD AUTO: 0.19 X10(3)/MCL (ref 0–0.04)
IMM GRANULOCYTES NFR BLD AUTO: 0.6 %
IMM GRANULOCYTES NFR BLD AUTO: 1.2 %
LYMPHOCYTES # BLD AUTO: 1.45 X10(3)/MCL (ref 0.6–4.6)
LYMPHOCYTES # BLD AUTO: 1.93 X10(3)/MCL (ref 0.6–4.6)
LYMPHOCYTES NFR BLD AUTO: 12 %
LYMPHOCYTES NFR BLD AUTO: 7.9 %
MAGNESIUM SERPL-MCNC: 1.9 MG/DL (ref 1.6–2.6)
MCH RBC QN AUTO: 34.9 PG (ref 27–31)
MCH RBC QN AUTO: 34.9 PG (ref 27–31)
MCHC RBC AUTO-ENTMCNC: 34.3 G/DL (ref 33–36)
MCHC RBC AUTO-ENTMCNC: 34.5 G/DL (ref 33–36)
MCV RBC AUTO: 101 FL (ref 80–94)
MCV RBC AUTO: 101.9 FL (ref 80–94)
MONOCYTES # BLD AUTO: 0.86 X10(3)/MCL (ref 0.1–1.3)
MONOCYTES # BLD AUTO: 0.89 X10(3)/MCL (ref 0.1–1.3)
MONOCYTES NFR BLD AUTO: 4.7 %
MONOCYTES NFR BLD AUTO: 5.6 %
NEUTROPHILS # BLD AUTO: 12.98 X10(3)/MCL (ref 2.1–9.2)
NEUTROPHILS # BLD AUTO: 15.97 X10(3)/MCL (ref 2.1–9.2)
NEUTROPHILS NFR BLD AUTO: 81 %
NEUTROPHILS NFR BLD AUTO: 86.5 %
NRBC BLD AUTO-RTO: 0 %
NRBC BLD AUTO-RTO: 0 %
PHOSPHATE SERPL-MCNC: 2.5 MG/DL (ref 2.3–4.7)
PLATELET # BLD AUTO: 291 X10(3)/MCL (ref 130–400)
PLATELET # BLD AUTO: 314 X10(3)/MCL (ref 130–400)
PMV BLD AUTO: 10.8 FL (ref 7.4–10.4)
PMV BLD AUTO: 9.9 FL (ref 7.4–10.4)
POTASSIUM SERPL-SCNC: 4.1 MMOL/L (ref 3.5–5.1)
POTASSIUM SERPL-SCNC: 4.4 MMOL/L (ref 3.5–5.1)
POTASSIUM SERPL-SCNC: 4.4 MMOL/L (ref 3.5–5.1)
PROT SERPL-MCNC: 5 GM/DL (ref 5.8–7.6)
PROT SERPL-MCNC: 5.8 GM/DL (ref 5.8–7.6)
RBC # BLD AUTO: 3.01 X10(6)/MCL (ref 4.2–5.4)
RBC # BLD AUTO: 3.21 X10(6)/MCL (ref 4.2–5.4)
SODIUM SERPL-SCNC: 133 MMOL/L (ref 136–145)
SODIUM SERPL-SCNC: 135 MMOL/L (ref 136–145)
SODIUM SERPL-SCNC: 135 MMOL/L (ref 136–145)
WBC # SPEC AUTO: 16.03 X10(3)/MCL (ref 4.5–11.5)
WBC # SPEC AUTO: 18.43 X10(3)/MCL (ref 4.5–11.5)

## 2024-03-03 PROCEDURE — 63600175 PHARM REV CODE 636 W HCPCS

## 2024-03-03 PROCEDURE — 80053 COMPREHEN METABOLIC PANEL: CPT

## 2024-03-03 PROCEDURE — 85025 COMPLETE CBC W/AUTO DIFF WBC: CPT

## 2024-03-03 PROCEDURE — 87040 BLOOD CULTURE FOR BACTERIA: CPT

## 2024-03-03 PROCEDURE — 83735 ASSAY OF MAGNESIUM: CPT | Performed by: INTERNAL MEDICINE

## 2024-03-03 PROCEDURE — 84100 ASSAY OF PHOSPHORUS: CPT | Performed by: INTERNAL MEDICINE

## 2024-03-03 PROCEDURE — 25000003 PHARM REV CODE 250: Performed by: STUDENT IN AN ORGANIZED HEALTH CARE EDUCATION/TRAINING PROGRAM

## 2024-03-03 PROCEDURE — 25000003 PHARM REV CODE 250: Performed by: INTERNAL MEDICINE

## 2024-03-03 PROCEDURE — 63600175 PHARM REV CODE 636 W HCPCS: Performed by: STUDENT IN AN ORGANIZED HEALTH CARE EDUCATION/TRAINING PROGRAM

## 2024-03-03 PROCEDURE — S5010 5% DEXTROSE AND 0.45% SALINE: HCPCS | Performed by: STUDENT IN AN ORGANIZED HEALTH CARE EDUCATION/TRAINING PROGRAM

## 2024-03-03 PROCEDURE — 11000001 HC ACUTE MED/SURG PRIVATE ROOM

## 2024-03-03 PROCEDURE — 25000003 PHARM REV CODE 250

## 2024-03-03 RX ORDER — SODIUM CHLORIDE AND POTASSIUM CHLORIDE 150; 900 MG/100ML; MG/100ML
INJECTION, SOLUTION INTRAVENOUS CONTINUOUS PRN
Status: DISCONTINUED | OUTPATIENT
Start: 2024-03-03 | End: 2024-03-05 | Stop reason: HOSPADM

## 2024-03-03 RX ORDER — GLUCAGON 1 MG
1 KIT INJECTION
Status: DISCONTINUED | OUTPATIENT
Start: 2024-03-03 | End: 2024-03-05 | Stop reason: HOSPADM

## 2024-03-03 RX ORDER — IBUPROFEN 200 MG
24 TABLET ORAL
Status: DISCONTINUED | OUTPATIENT
Start: 2024-03-03 | End: 2024-03-05 | Stop reason: HOSPADM

## 2024-03-03 RX ORDER — LEVOTHYROXINE SODIUM 125 UG/1
125 TABLET ORAL
Status: DISCONTINUED | OUTPATIENT
Start: 2024-03-04 | End: 2024-03-05 | Stop reason: HOSPADM

## 2024-03-03 RX ORDER — LEVOTHYROXINE SODIUM 125 UG/1
125 TABLET ORAL
COMMUNITY

## 2024-03-03 RX ORDER — INSULIN ASPART 100 [IU]/ML
0-10 INJECTION, SOLUTION INTRAVENOUS; SUBCUTANEOUS
Status: DISCONTINUED | OUTPATIENT
Start: 2024-03-03 | End: 2024-03-05 | Stop reason: HOSPADM

## 2024-03-03 RX ORDER — SODIUM CHLORIDE 9 MG/ML
INJECTION, SOLUTION INTRAVENOUS CONTINUOUS
Status: DISCONTINUED | OUTPATIENT
Start: 2024-03-03 | End: 2024-03-04

## 2024-03-03 RX ORDER — IBUPROFEN 200 MG
16 TABLET ORAL
Status: DISCONTINUED | OUTPATIENT
Start: 2024-03-03 | End: 2024-03-05 | Stop reason: HOSPADM

## 2024-03-03 RX ORDER — DEXTROSE MONOHYDRATE, SODIUM CHLORIDE, AND POTASSIUM CHLORIDE 50; 1.49; 4.5 G/1000ML; G/1000ML; G/1000ML
INJECTION, SOLUTION INTRAVENOUS CONTINUOUS PRN
Status: DISCONTINUED | OUTPATIENT
Start: 2024-03-03 | End: 2024-03-05 | Stop reason: HOSPADM

## 2024-03-03 RX ADMIN — ENOXAPARIN SODIUM 40 MG: 40 INJECTION SUBCUTANEOUS at 04:03

## 2024-03-03 RX ADMIN — INSULIN ASPART 10 UNITS: 100 INJECTION, SOLUTION INTRAVENOUS; SUBCUTANEOUS at 08:03

## 2024-03-03 RX ADMIN — INSULIN ASPART 2 UNITS: 100 INJECTION, SOLUTION INTRAVENOUS; SUBCUTANEOUS at 11:03

## 2024-03-03 RX ADMIN — SODIUM CHLORIDE: 9 INJECTION, SOLUTION INTRAVENOUS at 11:03

## 2024-03-03 RX ADMIN — SODIUM CHLORIDE: 9 INJECTION, SOLUTION INTRAVENOUS at 07:03

## 2024-03-03 RX ADMIN — LATANOPROST 1 DROP: 50 SOLUTION OPHTHALMIC at 09:03

## 2024-03-03 RX ADMIN — DEXTROSE AND SODIUM CHLORIDE: 5; 450 INJECTION, SOLUTION INTRAVENOUS at 08:03

## 2024-03-03 NOTE — H&P
[facility] - Emergency Dept  Pulmonary Critical Care Note    Patient Name: Daisy Calderon  MRN: [id number]  Admission Date: 3/2/2024  Hospital Length of Stay: 0 days  Code Status: Full Code  Attending Provider: ERICK Patel MD  Primary Care Provider: Yana Pearce MD     Subjective:     HPI:   68-year-old female with a past medical history of diabetes type 1, hypertension, hypothyroidism presented to the emergency department for nausea and vomiting which began earlier today.  She also endorses having some generalized abdominal pain.  She was recently admitted to this hospital for gastroenteritis  and was staying for 1 day before being discharged.  Her last episode DKA was in January of this year.  Interestingly enough patient does have continuous glucose monitoring implant along with an insulin pump and is followed by endocrinology closely.    In the emergency department she had a WBC 15.48, macrocytic RBCs .3, sodium 133, bicarb 9 gap 24 the glucose being 369, beta hydroxybutyrate 11.3, positive urine ketones,  lactate within normal limits, troponin negative.  Her ABG showed 7.1 98.  I suspect her labs are somewhat concentrated given she is usually anemic closer to the 11-12 range where her hg is nearly 14 at this time.    Hospital Course/Significant events:      24 Hour Interval History:      Past Medical History:   Diagnosis Date    Diabetes type I     HTN (hypertension)     Hypothyroidism, unspecified     Tobacco user        Past Surgical History:   Procedure Laterality Date    CARPAL TUNNEL RELEASE      CATARACT EXTRACTION       SECTION      CHOLECYSTECTOMY      TOTAL ABDOMINAL HYSTERECTOMY      TUBAL LIGATION         Social History     Socioeconomic History    Marital status: Single   Tobacco Use    Smoking status: Every Day     Current packs/day: 0.25     Types: Cigarettes    Smokeless tobacco: Never   Substance and Sexual Activity    Alcohol use: Yes      Alcohol/week: 6.0 standard drinks of alcohol     Types: 6 Cans of beer per week     Social Determinants of Health     Financial Resource Strain: Low Risk  (7/26/2023)    Overall Financial Resource Strain (CARDIA)     Difficulty of Paying Living Expenses: Not hard at all   Food Insecurity: No Food Insecurity (1/18/2024)    Hunger Vital Sign     Worried About Running Out of Food in the Last Year: Never true     Ran Out of Food in the Last Year: Never true   Transportation Needs: No Transportation Needs (1/18/2024)    PRAPARE - Transportation     Lack of Transportation (Medical): No     Lack of Transportation (Non-Medical): No   Physical Activity: Sufficiently Active (7/26/2023)    Exercise Vital Sign     Days of Exercise per Week: 5 days     Minutes of Exercise per Session: 30 min   Stress: No Stress Concern Present (7/26/2023)    [employer] - Occupational Stress Questionnaire     Feeling of Stress : Not at all   Social Connections: Moderately Integrated (7/26/2023)    Social Connection and Isolation Panel [NHANES]     Frequency of Communication with Friends and Family: Twice a week     Frequency of Social Gatherings with Friends and Family: Once a week     Attends [employer] Services: More than 4 times per year     Active Member of Clubs or Organizations: No     Attends Club or Organization Meetings: Never     Marital Status: Living with partner   Housing Stability: Low Risk  (1/18/2024)    Housing Stability Vital Sign     Unable to Pay for Housing in the Last Year: No     Number of Places Lived in the Last Year: 1     Unstable Housing in the Last Year: No           Current Outpatient Medications   Medication Instructions    insulin aspart U-100 (NOVOLOG) 67 Units, Subcutaneous, Daily, Average taken 34 units daily    Lactobacillus rhamnosus GG (CULTURELLE) 10 billion cell capsule 1 capsule, Oral, Daily PRN    latanoprost 0.005 % ophthalmic solution 1 drop, Ophthalmic, Nightly     levothyroxine (SYNTHROID) 150 mcg, Oral, Before breakfast, Mon - Sat no pill on Sun    lisinopriL 10 mg, Oral, Daily    loratadine (CLARITIN) 5 mg, Oral, Daily    multivitamin (ONE DAILY MULTIVITAMIN) per tablet 1 tablet, Oral, Daily    nitrofurantoin, macrocrystal-monohydrate, (MACROBID) 100 MG capsule 100 mg, Oral, 2 times daily    ondansetron (ZOFRAN) 4 mg, Oral, Every 6 hours PRN    zoledronic acid-mannitol & water (RECLAST) 5 mg/100 mL PgBk 5 mg, Intravenous, Once       Current Inpatient Medications   magnesium sulfate IVPB  2 g Intravenous Once       Current Intravenous Infusions   sodium chloride 0.9% 1,000 mL (03/02/24 2015)    dextrose 5 % and 0.45 % NaCl      insulin regular 1 units/mL infusion orderable (DKA) 0.1 Units/kg/hr (03/02/24 1932)         Review of Systems   Constitutional:  Negative for chills and fever.   Respiratory:  Negative for cough and shortness of breath.    Cardiovascular:  Negative for chest pain and palpitations.   Gastrointestinal:  Positive for abdominal pain, nausea and vomiting. Negative for blood in stool, constipation and diarrhea.   Genitourinary:  Negative for flank pain and hematuria.   Skin:  Negative for itching and rash.   Neurological:  Negative for weakness and headaches.   Endo/Heme/Allergies:  Negative for polydipsia. Does not bruise/bleed easily.          Objective:       Intake/Output Summary (Last 24 hours) at 3/2/2024 1952  Last data filed at 3/2/2024 1935  Gross per 24 hour   Intake 1000 ml   Output --   Net 1000 ml         Vital Signs (Most Recent):  Temp: 98.4 °F (36.9 °C) (03/02/24 1649)  Pulse: 106 (03/02/24 1804)  Resp: 16 (03/02/24 1804)  BP: 123/75 (03/02/24 1804)  SpO2: 99 % (03/02/24 1804)  Body mass index is 19.2 kg/m².  Weight: 47.6 kg (105 lb) Vital Signs (24h Range):  Temp:  [98.4 °F (36.9 °C)] 98.4 °F (36.9 °C)  Pulse:  [] 106  Resp:  [16] 16  SpO2:  [98 %-99 %] 99 %  BP: ()/(55-75) 123/75     Physical Exam  Vitals reviewed.    Constitutional:       General: She is not in acute distress.     Appearance: Normal appearance. She is ill-appearing.   HENT:      Head: Normocephalic and atraumatic.      Mouth/Throat:      Mouth: Mucous membranes are dry.      Pharynx: No oropharyngeal exudate or posterior oropharyngeal erythema.   Eyes:      General: No scleral icterus.     Conjunctiva/sclera: Conjunctivae normal.   Cardiovascular:      Rate and Rhythm: Normal rate and regular rhythm.      Pulses: Normal pulses.   Pulmonary:      Effort: Pulmonary effort is normal. No respiratory distress.      Breath sounds: Normal breath sounds. No wheezing.   Abdominal:      General: There is no distension.      Palpations: Abdomen is soft.      Tenderness: There is abdominal tenderness (generalized, diffuse). There is no guarding or rebound.   Musculoskeletal:      Cervical back: Neck supple. No rigidity.   Skin:     General: Skin is warm and dry.   Neurological:      Mental Status: She is alert and oriented to person, place, and time. Mental status is at baseline.   Psychiatric:         Mood and Affect: Mood normal.         Behavior: Behavior normal.           Lines/Drains/Airways       Peripheral Intravenous Line  Duration                  Peripheral IV - Single Lumen 03/02/24 1705 Anterior;Left Hand <1 day         Peripheral IV - Single Lumen 03/02/24 20 G Anterior;Proximal;Right Forearm <1 day                    Significant Labs:    Lab Results   Component Value Date    WBC 15.48 (H) 03/02/2024    HGB 13.6 03/02/2024    HCT 41.3 03/02/2024    .3 (H) 03/02/2024     03/02/2024           BMP  Lab Results   Component Value Date     (L) 03/02/2024    K 4.9 03/02/2024    CHLORIDE 100 03/02/2024    CO2 9 (LL) 03/02/2024    BUN 17.7 03/02/2024    CREATININE 1.02 03/02/2024    CALCIUM 9.3 03/02/2024    AGAP 3.0 01/18/2024    EGFRNONAA >60 01/14/2022         ABG  Recent Labs   Lab 03/02/24  1802   PH 7.120*   PO2 98.0   PCO2 24.0*   HCO3 7.8*    POCBASEDEF -19.90*       Mechanical Ventilation Support:         Significant Imaging:  I have reviewed the pertinent imaging within the past 24 hours.        Assessment/Plan:     Assessment  DKA  On arrival: CB  Urine Ketones: +  Bicarb: 9  Gap: 24  BHB: 11.3  pH: 7.12  HbA1c on 24:  6.8. Pending repeat  Likely 2/2 gastroenteritis  Home DM regimen:  In his insulin pump usually receives between 30-40 units daily.  Metabolic acidosis, respiratory alkalosis  Past medical history hypertension, hypothyroidism      Plan  -Admitted to ICU with cardiac monitoring, DKA Protocol initiated  -Start insulin gtt per protocol with q1h accuchecks while on the drip.    -Transition to SQ insulin and continue the drip for additional 2 hours if able to tolerate PO w/o N/V  Bicarb > 18  Anion gap < 12  Glucose < 250 on 2 consecutive readings.    SQ insulin dosing: SSI or 0.5-0.8 U/kg divided between Long and short acting  -Monitor electrolytes with BMP q4h, replete per DKA protocol. Keep K > 4, Mg > 2, Phos > 3  -Bariatric clear liquid diet while on insulin gtt then change to Diabetic diet when initiating subq insulin with accuchecks 4x daily before meals and at bedtime (AC and HS)  -Continue close ICU monitoring. Once off drip and stable, downgrade to floor      hold off on restarting her levothyroxine as she takes it Monday through Saturday and tomorrow is .    DVT Prophylaxis: lovenox  GI Prophylaxis: na     32 minutes of critical care was time spent personally by me on the following activities: development of treatment plan with patient or surrogate and bedside caregivers, discussions with consultants, evaluation of patient's response to treatment, examination of patient, ordering and performing treatments and interventions, ordering and review of laboratory studies, ordering and review of radiographic studies, pulse oximetry, re-evaluation of patient's condition.  This critical care time did not overlap  with that of any other provider or involve time for any procedures.     Zac Bang,   Pulmonary Critical Care Medicine  [facility] - Emergency Dept  DOS: 03/02/2024

## 2024-03-03 NOTE — NURSING
Nurses Note -- 4 Eyes      3/3/2024   10:21 AM      Skin assessed during: Q Shift Change      [x] No Altered Skin Integrity Present    [x]Prevention Measures Documented      [] Yes- Altered Skin Integrity Present or Discovered   [] LDA Added if Not in Epic (Describe Wound)   [] New Altered Skin Integrity was Present on Admit and Documented in LDA   [] Wound Image Taken    Wound Care Consulted? No    Attending Nurse:  Mahamed Gomez RN/Staff Member:   brenda alarcon RN

## 2024-03-04 LAB
GLUCOSE SERPL-MCNC: 206 MG/DL (ref 70–110)
POCT GLUCOSE: 143 MG/DL (ref 70–110)
POCT GLUCOSE: 253 MG/DL (ref 70–110)

## 2024-03-04 PROCEDURE — 63600175 PHARM REV CODE 636 W HCPCS: Performed by: STUDENT IN AN ORGANIZED HEALTH CARE EDUCATION/TRAINING PROGRAM

## 2024-03-04 PROCEDURE — 25000003 PHARM REV CODE 250: Performed by: INTERNAL MEDICINE

## 2024-03-04 PROCEDURE — 25000003 PHARM REV CODE 250

## 2024-03-04 PROCEDURE — 63600175 PHARM REV CODE 636 W HCPCS

## 2024-03-04 PROCEDURE — 21400001 HC TELEMETRY ROOM

## 2024-03-04 PROCEDURE — 63600175 PHARM REV CODE 636 W HCPCS: Performed by: INTERNAL MEDICINE

## 2024-03-04 PROCEDURE — 99233 SBSQ HOSP IP/OBS HIGH 50: CPT | Mod: ,,, | Performed by: INTERNAL MEDICINE

## 2024-03-04 RX ORDER — CEFTRIAXONE 250 MG/1
1000 INJECTION, POWDER, FOR SOLUTION INTRAMUSCULAR; INTRAVENOUS
Status: DISCONTINUED | OUTPATIENT
Start: 2024-03-04 | End: 2024-03-04

## 2024-03-04 RX ORDER — SODIUM CHLORIDE 9 MG/ML
INJECTION, SOLUTION INTRAVENOUS CONTINUOUS
Status: DISCONTINUED | OUTPATIENT
Start: 2024-03-04 | End: 2024-03-05 | Stop reason: HOSPADM

## 2024-03-04 RX ADMIN — INSULIN ASPART 2 UNITS: 100 INJECTION, SOLUTION INTRAVENOUS; SUBCUTANEOUS at 04:03

## 2024-03-04 RX ADMIN — ENOXAPARIN SODIUM 40 MG: 40 INJECTION SUBCUTANEOUS at 04:03

## 2024-03-04 RX ADMIN — LISINOPRIL 10 MG: 10 TABLET ORAL at 10:03

## 2024-03-04 RX ADMIN — LEVOTHYROXINE SODIUM 125 MCG: 125 TABLET ORAL at 05:03

## 2024-03-04 RX ADMIN — SODIUM CHLORIDE: 9 INJECTION, SOLUTION INTRAVENOUS at 03:03

## 2024-03-04 RX ADMIN — CEFTRIAXONE 1 G: 1 INJECTION, POWDER, FOR SOLUTION INTRAMUSCULAR; INTRAVENOUS at 10:03

## 2024-03-04 NOTE — NURSING
"0800  Patient using own insulin pump for our coverage scale accorking to her bloood sugar, we are using her basel and "on Board" insulin and she will bolus herself with her pump to match our slideing scale total.   Her bolus wa 0.54 at 8am  with    1145 her "onboard" insulin was 1.22 and basal of 0.53 with needing a total of 6 units for coverage and she bolused 4.25 units. Report given to nurse reguarding this type of coverage for this patient.  "

## 2024-03-04 NOTE — PROGRESS NOTES
03/04/24 1512   Discharge Assessment   Assessment Type Discharge Planning Assessment   Confirmed/corrected address, phone number and insurance Yes   Confirmed Demographics Correct on Facesheet   Source of Information patient   When was your last doctors appointment?   (Feb 2024)   Communicated NAUN with patient/caregiver Date not available/Unable to determine   Reason For Admission Acidosis, Hyperglycemia, Nausea and vomiting, Chest pain, Type 1 diabetes mellitus with ketoacidosis without coma   People in Home alone   Do you expect to return to your current living situation? Yes   Do you have help at home or someone to help you manage your care at home? Yes   Who are your caregiver(s) and their phone number(s)? Sommer Calderon (Other) [phone] (   Prior to hospitilization cognitive status: Alert/Oriented   Current cognitive status: Alert/Oriented   Walking or Climbing Stairs Difficulty no   Dressing/Bathing Difficulty no   Equipment Currently Used at Home other (see comments)  (insulin pump)   Readmission within 30 days? Yes   Patient currently being followed by outpatient case management? No   Do you currently have service(s) that help you manage your care at home? No   Do you take prescription medications? Yes   Do you have prescription coverage? Yes   Coverage mcr   Do you have any problems affording any of your prescribed medications? No   Is the patient taking medications as prescribed? yes   Who is going to help you get home at discharge? family   How do you get to doctors appointments? car, drives self   Are you on dialysis? No   Do you take coumadin? No   Discharge Plan A Home   Discharge Plan B Home   DME Needed Upon Discharge  none   Discharge Plan discussed with: Patient   Financial Resource Strain   How hard is it for you to pay for the very basics like food, housing, medical care, and heating? Not hard   Housing Stability   In the last 12 months, was there a time when you were not able to pay the  mortgage or rent on time? N   In the last 12 months, how many places have you lived? 1   In the last 12 months, was there a time when you did not have a steady place to sleep or slept in a shelter (including now)? N   Transportation Needs   In the past 12 months, has lack of transportation kept you from medical appointments or from getting medications? no   In the past 12 months, has lack of transportation kept you from meetings, work, or from getting things needed for daily living? No   Food Insecurity   Within the past 12 months, you worried that your food would run out before you got the money to buy more. Never true   Within the past 12 months, the food you bought just didn't last and you didn't have money to get more. Never true   Social Connections   Are you , , , , never , or living with a partner? Never marrie   Alcohol Use   Q1: How often do you have a drink containing alcohol? Monthly or l   Q2: How many drinks containing alcohol do you have on a typical day when you are drinking? 1 or 2   Q3: How often do you have six or more drinks on one occasion? Never     Patient states she does need any services from Case Management at this time.

## 2024-03-04 NOTE — NURSING
Nurses Note -- 4 Eyes      3/4/2024   1:53 PM      Skin assessed during: Transfer      [x] No Altered Skin Integrity Present    []Prevention Measures Documented      [] Yes- Altered Skin Integrity Present or Discovered   [] LDA Added if Not in Epic (Describe Wound)   [] New Altered Skin Integrity was Present on Admit and Documented in LDA   [] Wound Image Taken    Wound Care Consulted? No    Attending Nurse:  Khanh Bernabe RN     Second RN/Staff Member:   Autumn Schreiber RN

## 2024-03-04 NOTE — PROGRESS NOTES
[facility] Hospital Medicine Progress Note        Chief Complaint: Inpatient Follow-up for     HPI per admitting team:  68-year-old female with diabetes mellitus type 1, hypertension, hypothyroidism presented to the ED with complaints of nausea and vomiting.  Also had some generalized abdominal pain.  Recently was treated for gastroenteritis and another episode of DKA.  Remains on an insulin pump for her diabetes mellitus type 1.    On admission WBC was noted to be in the 15 K range with an anion gap of 24 and a glucose level of 369 with a beta hydroxybutyrate level of 11.3.  She was positive for ketones.  She was admitted to the ICU for diabetic ketoacidosis protocol.  Transition to medicine service once DKA resolved.        Interval Hx:   The patient was seen and examined.  No acute complaints as such at the time of my visit.  Still boarding in the ICU.  In error I had seen a urinalysis from last visit which was noted to be positive.  She will receive 1 dose of Rocephin.  No UA was completed at this time.    Blood cultures have remained negative.  Nurse taking care of her is at bedside and educated on being mindful of the insulin on board considering patient's basal rate on insulin pump before calculating the sliding scale requirement.    Only complaint patient has is an infected tooth, unsure if this is significant for her admission however.      Case was discussed with patient's nurse and  on the floor.    Objective/physical exam:  General: In no acute distress, afebrile  Chest: Clear to auscultation bilaterally  Heart: RRR, +S1, S2, no appreciable murmur  Abdomen: Soft, nontender, BS +  MSK: Warm, no lower extremity edema, no clubbing or cyanosis  Neurologic: Alert and oriented x4, nonfocal     VITAL SIGNS: 24 HRS MIN & MAX LAST   Temp  Min: 97.8 °F (36.6 °C)  Max: 98.6 °F (37 °C) 97.8 °F (36.6 °C)   BP  Min: 97/66  Max: 139/79 137/72   Pulse  Min: 68  Max: 91  83    Resp  Min: 10  Max: 24 17   SpO2  Min: 92 %  Max: 99 % 96 %     I have reviewed the following labs:  Recent Labs   Lab 03/02/24 1708 03/03/24 0105 03/03/24 0425   WBC 15.48* 18.43* 16.03*   RBC 3.96* 3.21* 3.01*   HGB 13.6 11.2* 10.5*   HCT 41.3 32.7* 30.4*   .3* 101.9* 101.0*   MCH 34.3* 34.9* 34.9*   MCHC 32.9* 34.3 34.5   RDW 11.9 12.0 12.0    314 291   MPV 10.1 10.8* 9.9     Recent Labs   Lab 03/02/24 1708 03/02/24 1802 03/02/24 2113 03/03/24 0105 03/03/24 0425 03/03/24  0746   *  --    < > 135* 135* 133*   K 4.9  --    < > 4.1 4.4 4.4   CO2 9*  --    < > 14* 20* 21*   BUN 17.7  --    < > 17.4 15.9 14.8   CREATININE 1.02  --    < > 0.89 0.96 0.94   CALCIUM 9.3  --    < > 8.2* 8.1* 9.0   PH  --  7.120*  --   --   --   --    MG 1.70  --   --   --   --  1.90   ALBUMIN 3.9  --   --  3.3* 3.0*  --    ALKPHOS 56  --   --  44 41  --    ALT 25  --   --  22 19  --    AST 27  --   --  26 21  --    BILITOT 0.5  --   --  0.3 0.4  --     < > = values in this interval not displayed.     Microbiology Results (last 7 days)       Procedure Component Value Units Date/Time    Blood Culture [[id number]] Collected: 03/03/24 1227    Order Status: Resulted Specimen: Blood Updated: 03/03/24 1807    Blood Culture [[id number]] Collected: 03/03/24 1227    Order Status: Resulted Specimen: Blood Updated: 03/03/24 1807             See below for Radiology    Scheduled Med:   cefTRIAXone (Rocephin) IV (PEDS and ADULTS)  1 g Intravenous Q24H    enoxparin  40 mg Subcutaneous Daily    latanoprost  1 drop Left Eye QHS    levothyroxine  125 mcg Oral Before breakfast    lisinopriL  10 mg Oral Daily      Continuous Infusions:   0.45% NaCl with KCl 20 mEq infusion      0/9% NACL & POTASSIUM CHLORIDE 20 MEQ/L      sodium chloride 0.9%      dextrose 5 % and 0.45 % NaCl Stopped (03/03/24 1022)    dextrose 5 % and 0.45 % NaCl with KCl 20 mEq        PRN Meds:  0.45% NaCl with KCl 20 mEq infusion, 0/9% NACL & POTASSIUM CHLORIDE  20 MEQ/L, dextrose 10 % in water (D10W), dextrose 10 % in water (D10W), dextrose 10%, dextrose 10%, dextrose 10%, dextrose 10%, dextrose 10%, dextrose 10%, dextrose 5 % and 0.45 % NaCl, dextrose 5 % and 0.45 % NaCl with KCl 20 mEq, glucagon (human recombinant), glucagon (human recombinant), glucose, glucose, glucose, glucose, insulin aspart U-100, naloxone, sodium chloride 0.9%, sodium chloride 0.9%     Assessment/Plan:  Insulin-dependent diabetes mellitus type 1   -presented with diabetic ketoacidosis, now resolved  -scale back on IV fluids to 50 cc an hour   -patient tolerating p.o.   -remains on insulin pump with basal bolus insulin   -continue to monitor, nursing staff advised on calculation of sliding scale insulin with being mindful of insulin on board with basal coverage     2. Tooth infection   -patient will need to be evaluated by dentist on an outpatient basis and is advised to get treatment completed   -no indication for antibiotic, patient recently completed a course during last discharge    3. Hypothyroidism   -continue levothyroxine 125 mcg p.o. daily     4. Hypertension   -on lisinopril, continue     VTE prophylaxis:  Lovenox    Patient condition:  Stable  Anticipated discharge and Disposition:   Home when stable      All diagnosis and differential diagnosis have been reviewed; assessment and plan has been documented; I have personally reviewed the labs and test results that are presently available; I have reviewed the patients medication list; I have reviewed the consulting providers response and recommendations. I have reviewed or attempted to review medical records based upon their availability    All of the patient's questions have been  addressed and answered. Patient's is agreeable to the above stated plan. I will continue to monitor closely and make adjustments to medical management as needed.  _____________________________________________________________________    Nutrition  Status:    Radiology:  I have personally reviewed the following imaging and agree with the radiologist.     CT Abdomen Pelvis With IV Contrast NO Oral Contrast  Narrative: EXAMINATION:  CT ABDOMEN PELVIS WITH IV CONTRAST    CLINICAL HISTORY:  Abdominal pain, acute, nonlocalized; vomiting and diarrhea today    TECHNIQUE:  Multidetector axial images were obtained of the abdomen and pelvis following the administration of IV contrast. Oral contrast was not administered.    Dose length product of 177 mGycm. Automated exposure control was utilized to minimize radiation dose.    COMPARISON:  May 26, 2019.    FINDINGS:  Included portion of the lungs are without suspicious nodularity, acute air space infiltrates or fluid within the pleural spaces.    Liver is remarkable for steatosis without focal space occupying lesion.  Gallbladder is surgically absent. No biliary dilation identified. Pancreatic atrophy without acute peripancreatic phlegmons. Main pancreatic duct is not dilated. Spleen is of normal size without focal lesion.    The adrenal glands appear within normal limits. The kidneys are unremarkable in size and contour. No solid or cystic renal lesion identified. There is no hydronephrosis. No perinephric fluid strandings or collections identified.    There is similar size of hiatal hernia and distal esophageal mural thickening.  There is concentric mural thickening of stomach and the proximal duodenum without surrounding inflammatory changes.  There is also segmental concentric mural thickening of the upper abdomen small bowel segment most apparent on image 71 series 2.  Distal loops of small bowel are without abnormal dilatation and there is no focal or generalized mural thickening.  Appendix is unremarkable.  Colonic fecal loading without abnormal dilatation or pericolonic acute strandings.   No bowel obstruction.  No free fluid.    There is urinary bladder mural thickening which was also evident on previous exam  consistent with cystitis.  Uterus is not present.  No pelvic free fluid..    There is stable chronic compression deformity along the superior endplate of T12.  No acute or aggressive skeletal abnormality.  Impression: 1. Concentric mural thickening of the stomach, proximal duodenum and right upper abdomen segmental small bowel loops consistent with gastroenteritis.  Follow-up exams are recommended to ensure these findings improve and resolve.    2.  Hiatal hernia and distal esophageal mural thickening which may be the result of reflux disease or related to esophagitis.    3.  Urinary bladder wall thickening is consistent with cystitis..    Electronically signed by: Reese Ness  Date:    02/17/2024  Time:    20:17      Yana Pearce MD  Department of Hospital Medicine   [facility]   03/04/2024

## 2024-03-05 ENCOUNTER — TELEPHONE (OUTPATIENT)
Dept: INTERNAL MEDICINE | Facility: CLINIC | Age: 69
End: 2024-03-05
Payer: MEDICARE

## 2024-03-05 VITALS
RESPIRATION RATE: 19 BRPM | BODY MASS INDEX: 19.31 KG/M2 | HEART RATE: 68 BPM | TEMPERATURE: 98 F | OXYGEN SATURATION: 96 % | HEIGHT: 62 IN | WEIGHT: 104.94 LBS | SYSTOLIC BLOOD PRESSURE: 143 MMHG | DIASTOLIC BLOOD PRESSURE: 72 MMHG

## 2024-03-05 LAB
ALBUMIN SERPL-MCNC: 2.8 G/DL (ref 3.4–4.8)
ALBUMIN/GLOB SERPL: 1.3 RATIO (ref 1.1–2)
ALP SERPL-CCNC: 42 UNIT/L (ref 40–150)
ALT SERPL-CCNC: 36 UNIT/L (ref 0–55)
AST SERPL-CCNC: 29 UNIT/L (ref 5–34)
BASOPHILS # BLD AUTO: 0.03 X10(3)/MCL
BASOPHILS NFR BLD AUTO: 0.6 %
BILIRUB SERPL-MCNC: 0.3 MG/DL
BUN SERPL-MCNC: 7.8 MG/DL (ref 9.8–20.1)
CALCIUM SERPL-MCNC: 8.5 MG/DL (ref 8.4–10.2)
CHLORIDE SERPL-SCNC: 106 MMOL/L (ref 98–107)
CO2 SERPL-SCNC: 26 MMOL/L (ref 23–31)
CREAT SERPL-MCNC: 0.57 MG/DL (ref 0.55–1.02)
EOSINOPHIL # BLD AUTO: 0.76 X10(3)/MCL (ref 0–0.9)
EOSINOPHIL NFR BLD AUTO: 15.5 %
ERYTHROCYTE [DISTWIDTH] IN BLOOD BY AUTOMATED COUNT: 11.8 % (ref 11.5–17)
GFR SERPLBLD CREATININE-BSD FMLA CKD-EPI: >60 MLS/MIN/1.73/M2
GLOBULIN SER-MCNC: 2.1 GM/DL (ref 2.4–3.5)
GLUCOSE SERPL-MCNC: 103 MG/DL (ref 82–115)
HCT VFR BLD AUTO: 33.9 % (ref 37–47)
HGB BLD-MCNC: 11.8 G/DL (ref 12–16)
IMM GRANULOCYTES # BLD AUTO: 0.01 X10(3)/MCL (ref 0–0.04)
IMM GRANULOCYTES NFR BLD AUTO: 0.2 %
LYMPHOCYTES # BLD AUTO: 1.74 X10(3)/MCL (ref 0.6–4.6)
LYMPHOCYTES NFR BLD AUTO: 35.4 %
MCH RBC QN AUTO: 34.5 PG (ref 27–31)
MCHC RBC AUTO-ENTMCNC: 34.8 G/DL (ref 33–36)
MCV RBC AUTO: 99.1 FL (ref 80–94)
MONOCYTES # BLD AUTO: 0.28 X10(3)/MCL (ref 0.1–1.3)
MONOCYTES NFR BLD AUTO: 5.7 %
NEUTROPHILS # BLD AUTO: 2.09 X10(3)/MCL (ref 2.1–9.2)
NEUTROPHILS NFR BLD AUTO: 42.6 %
NRBC BLD AUTO-RTO: 0 %
PLATELET # BLD AUTO: 259 X10(3)/MCL (ref 130–400)
PMV BLD AUTO: 9.9 FL (ref 7.4–10.4)
POCT GLUCOSE: 121 MG/DL (ref 70–110)
POCT GLUCOSE: 176 MG/DL (ref 70–110)
POCT GLUCOSE: 203 MG/DL (ref 70–110)
POTASSIUM SERPL-SCNC: 3.9 MMOL/L (ref 3.5–5.1)
PROT SERPL-MCNC: 4.9 GM/DL (ref 5.8–7.6)
RBC # BLD AUTO: 3.42 X10(6)/MCL (ref 4.2–5.4)
SODIUM SERPL-SCNC: 137 MMOL/L (ref 136–145)
WBC # SPEC AUTO: 4.91 X10(3)/MCL (ref 4.5–11.5)

## 2024-03-05 PROCEDURE — 25000003 PHARM REV CODE 250

## 2024-03-05 PROCEDURE — 85025 COMPLETE CBC W/AUTO DIFF WBC: CPT

## 2024-03-05 PROCEDURE — 99239 HOSP IP/OBS DSCHRG MGMT >30: CPT | Mod: ,,, | Performed by: INTERNAL MEDICINE

## 2024-03-05 PROCEDURE — 80053 COMPREHEN METABOLIC PANEL: CPT

## 2024-03-05 PROCEDURE — 1111F DSCHRG MED/CURRENT MED MERGE: CPT | Mod: CPTII,,, | Performed by: INTERNAL MEDICINE

## 2024-03-05 RX ORDER — AMOXICILLIN AND CLAVULANATE POTASSIUM 500; 125 MG/1; MG/1
1 TABLET, FILM COATED ORAL 3 TIMES DAILY
Qty: 21 TABLET | Refills: 0 | Status: SHIPPED | OUTPATIENT
Start: 2024-03-05 | End: 2024-04-07 | Stop reason: ALTCHOICE

## 2024-03-05 RX ADMIN — LISINOPRIL 10 MG: 10 TABLET ORAL at 08:03

## 2024-03-05 RX ADMIN — LEVOTHYROXINE SODIUM 125 MCG: 125 TABLET ORAL at 05:03

## 2024-03-05 NOTE — TELEPHONE ENCOUNTER
Pt requesting an antibiotic sent to the pharmacy prior to her dental procedure. There is nothing in Pt chart to refill. Please advise thank you

## 2024-03-05 NOTE — DISCHARGE SUMMARY
[facility] Hospital Medicine Discharge Summary    Admit Date: 3/2/2024  Discharge Date and Time: 3/5/71271:54 AM  Admitting Physician:  Team  Discharging Physician: Yana Pearce MD.  Primary Care Physician: Yana Pearce MD  Consults: None    Discharge Diagnoses:  Diabetes Mellitus Type 1    Hospital Course:   68-year-old female with diabetes mellitus type 1, hypertension, hypothyroidism presented to the ED with complaints of nausea and vomiting.  Also had some generalized abdominal pain.  Recently was treated for gastroenteritis and another episode of DKA.  Remains on an insulin pump for her diabetes mellitus type 1.    On admission WBC was noted to be in the 15 K range with an anion gap of 24 and a glucose level of 369 with a beta hydroxybutyrate level of 11.3.  She was positive for ketones.  She was admitted to the ICU for diabetic ketoacidosis protocol.  Transition to medicine service once DKA resolved.    Overall patient seems to be doing well.  She did well with the last 24 hours and her CBGS have remained stable.  We will give her a printout of the sliding scale B been using in the hospital for her to better understand.  She is advised to always calculate her bolus needs keeping in mind to calculate the insulin on board.    Her other medical comorbidities have remained stable     Pt was seen and examined on the day of discharge  Vitals:  VITAL SIGNS: 24 HRS MIN & MAX LAST   Temp  Min: 97.8 °F (36.6 °C)  Max: 98.4 °F (36.9 °C) 97.8 °F (36.6 °C)   BP  Min: 114/61  Max: 143/72 (!) 143/72   Pulse  Min: 68  Max: 90  68   Resp  Min: 17  Max: 20 (P) 18   SpO2  Min: 95 %  Max: 97 % 96 %       Physical Exam:  General: In no acute distress, afebrile  Chest: Clear to auscultation bilaterally  Heart: RRR, +S1, S2, no appreciable murmur  Abdomen: Soft, nontender, BS +  MSK: Warm, no lower extremity edema, no clubbing or cyanosis  Neurologic: Alert and oriented x4,  nonfocal     Procedures Performed: No admission procedures for hospital encounter.     Significant Diagnostic Studies: See Full reports for all details    Recent Labs   Lab 03/03/24 0105 03/03/24 0425 03/05/24  0507   WBC 18.43* 16.03* 4.91   RBC 3.21* 3.01* 3.42*   HGB 11.2* 10.5* 11.8*   HCT 32.7* 30.4* 33.9*   .9* 101.0* 99.1*   MCH 34.9* 34.9* 34.5*   MCHC 34.3 34.5 34.8   RDW 12.0 12.0 11.8    291 259   MPV 10.8* 9.9 9.9       Recent Labs   Lab 03/02/24  1708 03/02/24  1802 03/02/24 2113 03/03/24 0105 03/03/24 0425 03/03/24  0746 03/05/24  0507   *  --    < > 135* 135* 133* 137   K 4.9  --    < > 4.1 4.4 4.4 3.9   CO2 9*  --    < > 14* 20* 21* 26   BUN 17.7  --    < > 17.4 15.9 14.8 7.8*   CREATININE 1.02  --    < > 0.89 0.96 0.94 0.57   CALCIUM 9.3  --    < > 8.2* 8.1* 9.0 8.5   PH  --  7.120*  --   --   --   --   --    MG 1.70  --   --   --   --  1.90  --    ALBUMIN 3.9  --   --  3.3* 3.0*  --  2.8*   ALKPHOS 56  --   --  44 41  --  42   ALT 25  --   --  22 19  --  36   AST 27  --   --  26 21  --  29   BILITOT 0.5  --   --  0.3 0.4  --  0.3    < > = values in this interval not displayed.        Microbiology Results (last 7 days)       Procedure Component Value Units Date/Time    Blood Culture [[id number]]  (Normal) Collected: 03/03/24 1227    Order Status: Completed Specimen: Blood Updated: 03/04/24 1900     CULTURE, BLOOD ([facility]) No Growth At 24 Hours    Blood Culture [[id number]]  (Normal) Collected: 03/03/24 1227    Order Status: Completed Specimen: Blood Updated: 03/04/24 1900     CULTURE, BLOOD ([facility]) No Growth At 24 Hours             CT Abdomen Pelvis With IV Contrast NO Oral Contrast  Narrative: EXAMINATION:  CT ABDOMEN PELVIS WITH IV CONTRAST    CLINICAL HISTORY:  Abdominal pain, acute, nonlocalized; vomiting and diarrhea today    TECHNIQUE:  Multidetector axial images were obtained of the abdomen and pelvis following the administration of IV contrast. Oral contrast was not  administered.    Dose length product of 177 mGycm. Automated exposure control was utilized to minimize radiation dose.    COMPARISON:  May 26, 2019.    FINDINGS:  Included portion of the lungs are without suspicious nodularity, acute air space infiltrates or fluid within the pleural spaces.    Liver is remarkable for steatosis without focal space occupying lesion.  Gallbladder is surgically absent. No biliary dilation identified. Pancreatic atrophy without acute peripancreatic phlegmons. Main pancreatic duct is not dilated. Spleen is of normal size without focal lesion.    The adrenal glands appear within normal limits. The kidneys are unremarkable in size and contour. No solid or cystic renal lesion identified. There is no hydronephrosis. No perinephric fluid strandings or collections identified.    There is similar size of hiatal hernia and distal esophageal mural thickening.  There is concentric mural thickening of stomach and the proximal duodenum without surrounding inflammatory changes.  There is also segmental concentric mural thickening of the upper abdomen small bowel segment most apparent on image 71 series 2.  Distal loops of small bowel are without abnormal dilatation and there is no focal or generalized mural thickening.  Appendix is unremarkable.  Colonic fecal loading without abnormal dilatation or pericolonic acute strandings.   No bowel obstruction.  No free fluid.    There is urinary bladder mural thickening which was also evident on previous exam consistent with cystitis.  Uterus is not present.  No pelvic free fluid..    There is stable chronic compression deformity along the superior endplate of T12.  No acute or aggressive skeletal abnormality.  Impression: 1. Concentric mural thickening of the stomach, proximal duodenum and right upper abdomen segmental small bowel loops consistent with gastroenteritis.  Follow-up exams are recommended to ensure these findings improve and resolve.    2.   Hiatal hernia and distal esophageal mural thickening which may be the result of reflux disease or related to esophagitis.    3.  Urinary bladder wall thickening is consistent with cystitis..    Electronically signed by: Reese Ness  Date:    02/17/2024  Time:    20:17         Medication List        CONTINUE taking these medications      insulin aspart U-100 100 unit/mL injection  Commonly known as: NovoLOG     Lactobacillus rhamnosus GG 10 billion cell capsule  Commonly known as: CULTURELLE     latanoprost 0.005 % ophthalmic solution     levothyroxine 125 MCG tablet  Commonly known as: SYNTHROID     lisinopriL 10 MG tablet     ondansetron 4 MG tablet  Commonly known as: ZOFRAN  Take 1 tablet (4 mg total) by mouth every 6 (six) hours as needed for Nausea.     ONE DAILY MULTIVITAMIN per tablet  Generic drug: multivitamin     zoledronic acid-mannitol & water 5 mg/100 mL Pgbk  Commonly known as: RECLAST               Explained in detail to the patient about the discharge plan, medications, and follow-up visits. Pt understands and agrees with the treatment plan  Discharge Disposition: Home or Self Care   Discharged Condition: stable  Diet- Diabetic  Dietary Orders (From admission, onward)       Start     Ordered    03/03/24 1230  Diet diabetic  Diet effective now         03/03/24 1229                   Medications Per DC med rec  Activities as tolerated    For further questions contact Dr Pearce's office    Discharge time 33 minutes    For worsening symptoms, chest pain, shortness of breath, increased abdominal pain, high grade fever, stroke or stroke like symptoms, immediately go to the nearest Emergency Room or call 911 as soon as possible.      Yana Menard M.D on 3/5/2024. at 9:54 AM.

## 2024-03-05 NOTE — TELEPHONE ENCOUNTER
----- Message from Mckenzie Aragon sent at 3/5/2024  1:15 PM CST -----  Regarding: medical advice  Type:  Needs Medical Advice    Who Called: Daisy    Pharmacy name and phone #:  [facility] in [place]  Would the patient rather a call back or a response via [facility]? Call back   Best Call Back Number: [phone]  Additional Information: Daisy called to see if a Rx was sent for an antibotic to [facility] for her before her dental work.

## 2024-03-05 NOTE — CONSULTS
Inpatient Nutrition Evaluation    Admit Date: 3/2/2024   Total duration of encounter: 3 days   Patient Age: 68 y.o.    Nutrition Recommendation/Prescription     Continue Diet diabetic as tolerated.     Nutrition Assessment     Chart Review    Reason Seen: continuous nutrition monitoring and physician consult for pt wants to have more specific diet available to her to control her blood sugar levels    Malnutrition Screening Tool Results   Have you recently lost weight without trying?: No  Have you been eating poorly because of a decreased appetite?: No   MST Score: 0   Diagnosis:  Insulin-dependent diabetes mellitus type 1 with DKA, now resolved  Tooth infection     Relevant Medical History: Diabetes mellitus type 1, HTN, Hypothyroidism    Scheduled Medications:  enoxparin, 40 mg, Daily  latanoprost, 1 drop, QHS  levothyroxine, 125 mcg, Before breakfast  lisinopriL, 10 mg, Daily    Continuous Infusions:  0.45% NaCl with KCl 20 mEq infusion  0/9% NACL & POTASSIUM CHLORIDE 20 MEQ/L  sodium chloride 0.9%  dextrose 5 % and 0.45 % NaCl, Last Rate: Stopped (03/03/24 1022)  dextrose 5 % and 0.45 % NaCl with KCl 20 mEq    PRN Medications: 0.45% NaCl with KCl 20 mEq infusion, 0/9% NACL & POTASSIUM CHLORIDE 20 MEQ/L, dextrose 10 % in water (D10W), dextrose 10 % in water (D10W), dextrose 10%, dextrose 10%, dextrose 10%, dextrose 10%, dextrose 10%, dextrose 10%, dextrose 5 % and 0.45 % NaCl, dextrose 5 % and 0.45 % NaCl with KCl 20 mEq, glucagon (human recombinant), glucagon (human recombinant), glucose, glucose, glucose, glucose, insulin aspart U-100, naloxone, sodium chloride 0.9%, sodium chloride 0.9%    Recent Labs   Lab 03/02/24  1708 03/02/24  2113 03/03/24  0105 03/03/24  0425 03/03/24  0746 03/05/24  0507   * 135* 135* 135* 133* 137   K 4.9 4.1 4.1 4.4 4.4 3.9   CALCIUM 9.3 8.3* 8.2* 8.1* 9.0 8.5   PHOS  --   --   --   --  2.5  --    MG 1.70  --   --   --  1.90  --    CHLORIDE 100 106 107 109* 103 106   CO2 9* 9*  "14* 20* 21* 26   BUN 17.7 19.8 17.4 15.9 14.8 7.8*   CREATININE 1.02 0.97 0.89 0.96 0.94 0.57   EGFRNORACEVR 60 >60 >60 >60 >60 >60   GLUCOSE 369* 262* 141* 152* 167* 103   BILITOT 0.5  --  0.3 0.4  --  0.3   ALKPHOS 56  --  44 41  --  42   ALT 25  --  22 19  --  36   AST 27  --  26 21  --  29   ALBUMIN 3.9  --  3.3* 3.0*  --  2.8*   LIPASE 9  --   --   --   --   --    WBC 15.48*  --  18.43* 16.03*  --  4.91   HGB 13.6  --  11.2* 10.5*  --  11.8*   HCT 41.3  --  32.7* 30.4*  --  33.9*     Nutrition Orders:  Diet diabetic      Appetite/Oral Intake: good/% of meals  Factors Affecting Nutritional Intake: none identified  Food/Worship/Cultural Preferences: none reported  Food Allergies: none reported  Last Bowel Movement: 03/03/24  Wound(s):      Comments    3/5/24 Reports good appetite since DKA resolved, no longer nausea and vomiting. Denies any GI complaints or unintentional wt loss.     Anthropometrics    Height: 5' 2" (157.5 cm), Height Method: Stated  Last Weight: 47.6 kg (104 lb 15 oz) (03/03/24 0233), Weight Method: Stated  BMI (Calculated): 19.2  BMI Classification: underweight (BMI less than 22 if >65 years of age)     Ideal Body Weight (IBW), Female: 110 lb     % Ideal Body Weight, Female (lb): 95.4 %                             Usual Weight Provided By: patient denies unintentional weight loss    Wt Readings from Last 5 Encounters:   03/03/24 47.6 kg (104 lb 15 oz)   02/17/24 52.6 kg (116 lb)   01/29/24 52.2 kg (115 lb)   01/17/24 44.1 kg (97 lb 3.6 oz)   07/26/23 47.4 kg (104 lb 9.6 oz)     Weight Change(s) Since Admission:   Wt Readings from Last 1 Encounters:   03/03/24 0233 47.6 kg (104 lb 15 oz)   03/02/24 1649 47.6 kg (105 lb)   Admit Weight: 47.6 kg (105 lb) (03/02/24 1649), Weight Method: Stated    Patient Education     Education Provided: diabetic diet  Teaching Method: explanation of consult received  Comprehension: verbalizes understanding  Barriers to Learning: none evident  Expected " Compliance: good  Comments: Pt reports being a diabetic for >57 years. Politely declined needing additional information on diabetic diet. Has insulin pump and states she counts carbohydrates and uses short acting insulin as needed. All questions were answered and dietitian's contact information was provided.     Nutrition Goals & Monitoring     Dietitian will monitor: energy intake and food/nutrition knowledge skill    Nutrition Risk/Follow-Up: low (follow-up in 5-7 days)  Patients assigned 'low nutrition risk' status do not qualify for a full nutritional assessment but will be monitored and re-evaluated in a 5-7 day time period. Please consult if re-evaluation needed sooner.

## 2024-03-05 NOTE — PROGRESS NOTES
03/05/24 1002   Final Note   Assessment Type Final Discharge Note   Anticipated Discharge Disposition Home   Post-Acute Status   Discharge Delays None known at this time

## 2024-03-05 NOTE — DISCHARGE INSTRUCTIONS
For worsening symptoms, chest pain, shortness of breath, increased abdominal pain, high grade fever, stroke or stroke like symptoms, immediately go to the nearest Emergency Room or call 911 as soon as possible.

## 2024-03-06 ENCOUNTER — PATIENT OUTREACH (OUTPATIENT)
Dept: ADMINISTRATIVE | Facility: CLINIC | Age: 69
End: 2024-03-06
Payer: MEDICARE

## 2024-03-06 NOTE — PROGRESS NOTES
C3 nurse attempted to contact Daisy Calderon  for a TCC post hospital discharge follow up call. Contact number listed not in service. Called emergency contact number for Rhemi. No answer. Left voicemail with call back number. The patient does not have a scheduled HOSFU appointment with Yana Pearce MD  within 5-7 days post hospital discharge date 03/05/2024.

## 2024-03-06 NOTE — PROGRESS NOTES
C3 nurse attempted to contact Daisy Calderon  for a TCC post hospital discharge follow up call. Contact number listed not in service. Called emergency contact number for Rhemi. No answer. Left voicemail with call back number. The patient does not have a scheduled HOSFU appointment with Yana Pearce MD  within 5-7 days post hospital discharge date 03/05/2024.     Message sent to PCP staff requesting they contact patient and schedule follow up appointment.

## 2024-03-07 NOTE — PROGRESS NOTES
C3 nurse attempted to contact Daisy Calderon  for a TCC post hospital discharge follow up call. Contact number listed not in service. Called emergency contact number for Rhemi. No answer. Left voicemail with call back number.

## 2024-03-08 LAB
BACTERIA BLD CULT: NORMAL
BACTERIA BLD CULT: NORMAL

## 2024-03-15 LAB
POCT GLUCOSE: 130 MG/DL (ref 70–110)
POCT GLUCOSE: 139 MG/DL (ref 70–110)
POCT GLUCOSE: 144 MG/DL (ref 70–110)
POCT GLUCOSE: 149 MG/DL (ref 70–110)
POCT GLUCOSE: 151 MG/DL (ref 70–110)
POCT GLUCOSE: 159 MG/DL (ref 70–110)
POCT GLUCOSE: 160 MG/DL (ref 70–110)
POCT GLUCOSE: 161 MG/DL (ref 70–110)
POCT GLUCOSE: 165 MG/DL (ref 70–110)
POCT GLUCOSE: 206 MG/DL (ref 70–110)
POCT GLUCOSE: 235 MG/DL (ref 70–110)
POCT GLUCOSE: 237 MG/DL (ref 70–110)
POCT GLUCOSE: 240 MG/DL (ref 70–110)
POCT GLUCOSE: 249 MG/DL (ref 70–110)
POCT GLUCOSE: 294 MG/DL (ref 70–110)
POCT GLUCOSE: 368 MG/DL (ref 70–110)
POCT GLUCOSE: 434 MG/DL (ref 70–110)

## 2024-04-04 ENCOUNTER — TELEPHONE (OUTPATIENT)
Dept: RADIOLOGY | Facility: HOSPITAL | Age: 69
End: 2024-04-04
Payer: MEDICARE

## 2024-04-04 DIAGNOSIS — R92.0 ABNORMAL MAMMOGRAM WITH MICROCALCIFICATION: Primary | ICD-10-CM

## 2024-04-04 NOTE — CARE UPDATE
Called patient to follow up regarding recommended breast biopsy. She is feeling better since being hospitalized for her diabetes but she is currently awaiting an appointment with her dentist and also has an injured foot.  Offered to reschedule appointment for biopsy at end of April giving her time to address her tooth and injured foot, but pt declined stating that she does not want to have to cancel another appointment.  Educated patient that calcifications in her breast are suspicious and encouraged her to reschedule biopsy as soon as possible to have a definitive diagnosis in order to determine her plan of care. Pt verblized understanding and stated that she would contact the breast center when she is ready to reschedule her biopsy appointment

## 2024-04-07 ENCOUNTER — HOSPITAL ENCOUNTER (EMERGENCY)
Facility: HOSPITAL | Age: 69
Discharge: HOME | End: 2024-04-07
Attending: EMERGENCY MEDICINE
Payer: MEDICARE

## 2024-04-07 VITALS
DIASTOLIC BLOOD PRESSURE: 69 MMHG | HEART RATE: 72 BPM | SYSTOLIC BLOOD PRESSURE: 115 MMHG | WEIGHT: 105 LBS | TEMPERATURE: 98 F | HEIGHT: 63 IN | RESPIRATION RATE: 16 BRPM | BODY MASS INDEX: 18.61 KG/M2 | OXYGEN SATURATION: 99 %

## 2024-04-07 DIAGNOSIS — L03.115 CELLULITIS OF RIGHT FOOT: ICD-10-CM

## 2024-04-07 DIAGNOSIS — M79.5 FOREIGN BODY (FB) IN SOFT TISSUE: ICD-10-CM

## 2024-04-07 DIAGNOSIS — M79.661 PAIN AND SWELLING OF RIGHT LOWER LEG: ICD-10-CM

## 2024-04-07 DIAGNOSIS — M79.89 PAIN AND SWELLING OF RIGHT LOWER LEG: ICD-10-CM

## 2024-04-07 DIAGNOSIS — R50.9 FEVER, UNSPECIFIED FEVER CAUSE: ICD-10-CM

## 2024-04-07 DIAGNOSIS — S99.922A FOOT INJURY, LEFT, INITIAL ENCOUNTER: ICD-10-CM

## 2024-04-07 DIAGNOSIS — S90.211A SUBUNGUAL HEMATOMA OF GREAT TOE OF RIGHT FOOT, INITIAL ENCOUNTER: ICD-10-CM

## 2024-04-07 DIAGNOSIS — S92.314A NONDISPLACED FRACTURE OF FIRST METATARSAL BONE, RIGHT FOOT, INITIAL ENCOUNTER FOR CLOSED FRACTURE: ICD-10-CM

## 2024-04-07 DIAGNOSIS — S99.921A RIGHT FOOT INJURY, INITIAL ENCOUNTER: ICD-10-CM

## 2024-04-07 LAB
ALBUMIN SERPL-MCNC: 3.7 G/DL (ref 3.4–4.8)
ALBUMIN/GLOB SERPL: 1.5 RATIO (ref 1.1–2)
ALP SERPL-CCNC: 53 UNIT/L (ref 40–150)
ALT SERPL-CCNC: 20 UNIT/L (ref 0–55)
AST SERPL-CCNC: 20 UNIT/L (ref 5–34)
BASOPHILS # BLD AUTO: 0.02 X10(3)/MCL
BASOPHILS NFR BLD AUTO: 0.3 %
BILIRUB SERPL-MCNC: 0.3 MG/DL
BUN SERPL-MCNC: 10.4 MG/DL (ref 9.8–20.1)
CALCIUM SERPL-MCNC: 9.5 MG/DL (ref 8.4–10.2)
CHLORIDE SERPL-SCNC: 100 MMOL/L (ref 98–107)
CO2 SERPL-SCNC: 26 MMOL/L (ref 23–31)
CREAT SERPL-MCNC: 0.63 MG/DL (ref 0.55–1.02)
EOSINOPHIL # BLD AUTO: 0.64 X10(3)/MCL (ref 0–0.9)
EOSINOPHIL NFR BLD AUTO: 10.4 %
ERYTHROCYTE [DISTWIDTH] IN BLOOD BY AUTOMATED COUNT: 11.3 % (ref 11.5–17)
FLUAV AG UPPER RESP QL IA.RAPID: NOT DETECTED
FLUBV AG UPPER RESP QL IA.RAPID: NOT DETECTED
GFR SERPLBLD CREATININE-BSD FMLA CKD-EPI: >60 MLS/MIN/1.73/M2
GLOBULIN SER-MCNC: 2.5 GM/DL (ref 2.4–3.5)
GLUCOSE SERPL-MCNC: 152 MG/DL (ref 82–115)
HCT VFR BLD AUTO: 35.7 % (ref 37–47)
HGB BLD-MCNC: 12.5 G/DL (ref 12–16)
IMM GRANULOCYTES # BLD AUTO: 0.02 X10(3)/MCL (ref 0–0.04)
IMM GRANULOCYTES NFR BLD AUTO: 0.3 %
LYMPHOCYTES # BLD AUTO: 1.81 X10(3)/MCL (ref 0.6–4.6)
LYMPHOCYTES NFR BLD AUTO: 29.5 %
MCH RBC QN AUTO: 34.3 PG (ref 27–31)
MCHC RBC AUTO-ENTMCNC: 35 G/DL (ref 33–36)
MCV RBC AUTO: 98.1 FL (ref 80–94)
MONOCYTES # BLD AUTO: 0.31 X10(3)/MCL (ref 0.1–1.3)
MONOCYTES NFR BLD AUTO: 5.1 %
NEUTROPHILS # BLD AUTO: 3.33 X10(3)/MCL (ref 2.1–9.2)
NEUTROPHILS NFR BLD AUTO: 54.4 %
NRBC BLD AUTO-RTO: 0 %
PLATELET # BLD AUTO: 212 X10(3)/MCL (ref 130–400)
PMV BLD AUTO: 9.4 FL (ref 7.4–10.4)
POTASSIUM SERPL-SCNC: 3.9 MMOL/L (ref 3.5–5.1)
PROT SERPL-MCNC: 6.2 GM/DL (ref 5.8–7.6)
RBC # BLD AUTO: 3.64 X10(6)/MCL (ref 4.2–5.4)
SARS-COV-2 RNA RESP QL NAA+PROBE: NOT DETECTED
SODIUM SERPL-SCNC: 134 MMOL/L (ref 136–145)
WBC # SPEC AUTO: 6.13 X10(3)/MCL (ref 4.5–11.5)

## 2024-04-07 PROCEDURE — 85025 COMPLETE CBC W/AUTO DIFF WBC: CPT | Performed by: PHYSICIAN ASSISTANT

## 2024-04-07 PROCEDURE — 80053 COMPREHEN METABOLIC PANEL: CPT | Performed by: PHYSICIAN ASSISTANT

## 2024-04-07 PROCEDURE — 99284 EMERGENCY DEPT VISIT MOD MDM: CPT | Mod: 25

## 2024-04-07 PROCEDURE — 0240U COVID/FLU A&B PCR: CPT | Performed by: PHYSICIAN ASSISTANT

## 2024-04-07 RX ORDER — CEPHALEXIN 500 MG/1
500 CAPSULE ORAL 4 TIMES DAILY
Qty: 40 CAPSULE | Refills: 0 | Status: SHIPPED | OUTPATIENT
Start: 2024-04-07 | End: 2024-04-15

## 2024-04-07 RX ORDER — HYDROCODONE BITARTRATE AND ACETAMINOPHEN 7.5; 325 MG/1; MG/1
1 TABLET ORAL EVERY 6 HOURS PRN
Qty: 18 TABLET | Refills: 0 | Status: SHIPPED | OUTPATIENT
Start: 2024-04-07 | End: 2024-04-12

## 2024-04-07 RX ORDER — BACITRACIN ZINC 500 UNIT/G
OINTMENT (GRAM) TOPICAL 3 TIMES DAILY
Qty: 30 G | Refills: 0 | Status: SHIPPED | OUTPATIENT
Start: 2024-04-07 | End: 2024-04-15

## 2024-04-07 RX ORDER — HYDROCODONE BITARTRATE AND ACETAMINOPHEN 7.5; 325 MG/1; MG/1
1 TABLET ORAL EVERY 6 HOURS PRN
Status: DISCONTINUED | OUTPATIENT
Start: 2024-04-07 | End: 2024-04-07 | Stop reason: HOSPADM

## 2024-04-07 NOTE — FIRST PROVIDER EVALUATION
"Medical screening examination initiated.  I have conducted a focused provider triage encounter, findings are as follows:    Brief history of present illness:  67 yo female presents to ED for evaluation of bilateral foot and leg pain after a concrete block fell on top of her on Friday. Reports to having temp of 103 at home today. No meds taken.     Vitals:    04/07/24 1441   BP: (!) 161/73   Pulse: 80   Resp: 18   Temp: 98.6 °F (37 °C)   SpO2: 99%   Weight: 47.6 kg (105 lb)   Height: 5' 3" (1.6 m)       Pertinent physical exam:  Patient awake and alert sitting in wheelchair. Bilateral leg swelling with redness. Multiple abrasions noted to right shin.     Brief workup plan:  labs, COVID/FLU, XR    Preliminary workup initiated; this workup will be continued and followed by the physician or advanced practice provider that is assigned to the patient when roomed.  "

## 2024-04-07 NOTE — DISCHARGE INSTRUCTIONS
Foreign body seen on the x-ray of your left foot today does not appear to be an acute finding as there is no redness, wound or tenderness at the site.  If you do have any trouble with this foreign body in the future, you could be seen by a general surgeon or podiatrist and they could consider removing it

## 2024-04-07 NOTE — ED PROVIDER NOTES
Encounter Date: 2024       History     Chief Complaint   Patient presents with    Foot Injury    Leg Swelling     Bilateral foot swelling and redness that started Friday morning after concrete blocks fell on her feet while she was gardening. Hx Dm. Reports fever at home, Max temp of 103 today.      68 year old female with a  past medical history of DMT1, HTN, and hypothyroidism presents to the ED for bilateral foot swelling and redness onset Friday morning. Patient reports concrete blocks fell onto her foot while she was gardening, and her symptoms started shortly after. She states that her right foot feels warm to the touch, so she checked to see if she had a fever. Her home thermometer was reading 103 degrees, but patient states she did not feel bad and believes her thermometer may be inaccurate.       The history is provided by the patient. No  was used.     Review of patient's allergies indicates:   Allergen Reactions    Levofloxacin      Other reaction(s): itching, swelling    Oxycodone-aspirin     Brimonidine Nausea And Vomiting     Past Medical History:   Diagnosis Date    Diabetes type I     HTN (hypertension)     Hypothyroidism, unspecified     Tobacco user      Past Surgical History:   Procedure Laterality Date    CARPAL TUNNEL RELEASE      CATARACT EXTRACTION       SECTION      CHOLECYSTECTOMY      TOTAL ABDOMINAL HYSTERECTOMY      TUBAL LIGATION       No family history on file.  Social History     Tobacco Use    Smoking status: Every Day     Current packs/day: 0.25     Types: Cigarettes    Smokeless tobacco: Never   Substance Use Topics    Alcohol use: Yes     Alcohol/week: 6.0 standard drinks of alcohol     Types: 6 Cans of beer per week     Review of Systems   Respiratory:  Negative for shortness of breath.    Cardiovascular:  Negative for chest pain.   Gastrointestinal:  Negative for abdominal pain.   Genitourinary:  Negative for dysuria.   Musculoskeletal:         Foot  pain       Physical Exam     Initial Vitals [04/07/24 1441]   BP Pulse Resp Temp SpO2   (!) 161/73 80 18 98.6 °F (37 °C) 99 %      MAP       --         Physical Exam    Nursing note and vitals reviewed.  Constitutional: No distress.   HENT:   Head: Normocephalic and atraumatic.   Mouth/Throat: Oropharynx is clear and moist.   Eyes: Conjunctivae are normal.   Neck: Neck supple.   Normal range of motion.  Cardiovascular:  Normal rate and regular rhythm.           No murmur heard.  Pulses:       Dorsalis pedis pulses are 2+ on the right side and 2+ on the left side.   Pulmonary/Chest: Breath sounds normal. No respiratory distress. She exhibits no tenderness.   Abdominal: Abdomen is soft. Bowel sounds are normal. She exhibits no distension. There is no abdominal tenderness.   Musculoskeletal:         General: Normal range of motion.      Cervical back: Normal range of motion and neck supple.      Lumbar back: Normal. Normal range of motion.      Comments: The left heel is non tender. It does not appear red or warm. There is no open wound present.  Mild forefoot tenderness, tenderness and subungual hematoma to great toenail bed  Right foot distal first metatarsal has diffuse tenderness and bruising.  Right foot has subungual hematoma to the great toe, blister just proximal to nail bed  Multiple abrasions and scabs to the right shin     Neurological: She is alert and oriented to person, place, and time. She has normal strength. No cranial nerve deficit or sensory deficit.   Skin: Skin is warm and dry.         ED Course   Procedures  Labs Reviewed   COMPREHENSIVE METABOLIC PANEL - Abnormal; Notable for the following components:       Result Value    Sodium Level 134 (*)     Glucose Level 152 (*)     All other components within normal limits   CBC WITH DIFFERENTIAL - Abnormal; Notable for the following components:    RBC 3.64 (*)     Hct 35.7 (*)     MCV 98.1 (*)     MCH 34.3 (*)     RDW 11.3 (*)     All other components  within normal limits   COVID/FLU A&B PCR - Normal    Narrative:     The Xpert Xpress SARS-CoV-2/FLU/RSV plus is a rapid, multiplexed real-time PCR test intended for the simultaneous qualitative detection and differentiation of SARS-CoV-2, Influenza A, Influenza B, and respiratory syncytial virus (RSV) viral RNA in either nasopharyngeal swab or nasal swab specimens.         CBC W/ AUTO DIFFERENTIAL    Narrative:     The following orders were created for panel order CBC auto differential.  Procedure                               Abnormality         Status                     ---------                               -----------         ------                     CBC with Differential[[id number]]       Abnormal            Final result                 Please view results for these tests on the individual orders.          Imaging Results              X-Ray Foot Complete Right (Final result)  Result time 04/07/24 15:41:02      Final result by Glen Sparks MD (04/07/24 15:41:02)                   Impression:      Nondisplaced fracture of the distal 1st metatarsal.      Electronically signed by: Glen Sparks MD  Date:    04/07/2024  Time:    15:41               Narrative:    EXAMINATION:  XR FOOT COMPLETE 3 VIEW RIGHT    CLINICAL HISTORY:  Lower leg pain - dropped cinder block on both feet yesterday - swelling to great toe.    TECHNIQUE:  AP, lateral, and oblique views of the right foot were performed.    COMPARISON:  None    FINDINGS:  Nondisplaced fracture of the distal aspect of the 1st metatarsal.  No dislocation or subluxation.  Lisfranc interval is maintained.  Soft tissue swelling over the dorsal aspect of the foot.  Atherosclerotic calcifications are noted.                                       X-Ray Foot Complete Left (Final result)  Result time 04/07/24 15:39:26      Final result by Glen Sparks MD (04/07/24 15:39:26)                   Impression:      1. No acute fracture or dislocation.  2. 5 mm  metallic needle like foreign body in the soft tissues of the plantar aspect of the heel.      Electronically signed by: Glen Sparks MD  Date:    04/07/2024  Time:    15:39               Narrative:    EXAMINATION:  XR FOOT COMPLETE 3 VIEW LEFT    CLINICAL HISTORY:  Lower leg pain - dropped cinder block on both feet yesterday.    TECHNIQUE:  AP, lateral and oblique views of the left foot were performed.    COMPARISON:  None    FINDINGS:  No definite acute displaced fracture.  Diffuse osseous demineralization.  No dislocation or subluxation.  Lisfranc interval is maintained.  Atherosclerotic calcifications.  5 mm metallic needle like foreign body in the soft tissues of the plantar aspect of the heel.                                       X-Ray Tibia Fibula 2 View Right (Final result)  Result time 04/07/24 15:37:09      Final result by Angel Rick MD (04/07/24 15:37:09)                   Impression:      No acute osseous abnormality.      Electronically signed by: Angel Rick  Date:    04/07/2024  Time:    15:37               Narrative:    EXAMINATION:  XR TIBIA FIBULA 2 VIEW RIGHT    CLINICAL HISTORY:  Pain in right lower leg    TECHNIQUE:  AP and lateral views of the right tibia and fibula were performed.    COMPARISON:  None.    FINDINGS:  No acute displaced fracture, dislocation or osseous destructive process.  Nonspecific subcutaneous edema throughout the distal right lower extremity.  Vascular calcifications present.  No radiopaque retained foreign body.                                       Medications   HYDROcodone-acetaminophen 7.5-325 mg per tablet 1 tablet (1 tablet Oral Not Given 4/7/24 1736)     Medical Decision Making  Problems Addressed:  Cellulitis of right foot: acute illness or injury  Fever, unspecified fever cause: acute illness or injury  Foot injury, left, initial encounter: acute illness or injury  Foreign body (FB) in soft tissue: undiagnosed new problem with uncertain  prognosis  Nondisplaced fracture of first metatarsal bone, right foot, initial encounter for closed fracture: acute illness or injury  Pain and swelling of right lower leg: acute illness or injury  Right foot injury, initial encounter: acute illness or injury  Subungual hematoma of great toe of right foot, initial encounter: acute illness or injury    Risk  OTC drugs.  Prescription drug management.      ED assessment:    Ms. Calderon presented w/ foot injury several days ago, + subungual hematoma and diffuse R foot swelling. Multiple abrasions noted. No lacerations    Differential diagnosis (including but not limited to):   Foot fracture, soft tissue infection, subungual hematoma, abrasions, cellulitis, abscess    ED management:   XR w/ right first metatarsal fx. Will manage w/ flat soled shoe, crutches to WBAT, analgesics. L foot w/ no osseous injury; however, noted metallic foreign body. No signs of recent penetrating injury to the foot on my examination and no tenderness at the site. Maybe incidental/remote injury. Advised patient no indication for emergent removal, could follow up with surgery clinic if desires removal in the future.   With regards to her reported fever, no s/s respiratory or urinary infection. She does have erythema surrounding the multiple sites of abrasions particularly to the right lower leg. Will rx abx course for possible cellulitis. ED return precautions reviewed at the bedside and provided in the written discharge instructions. All questions answered to the best of my ability.       My independent radiology interpretation:   XR R foot: distal 1st metatarsal fx  XR L foot: thin radiopaque foreign body in heel soft tissues, no acute fracture      Amount and/or Complexity of Data Reviewed  Independent historian: none   Summary of history:   External data reviewed: prior imaging  Summary of data reviewed: no prior foot imaging for comparison of FB noted today  Risk and benefits of testing:  discussed   Labs: ordered and reviewed  Radiology: ordered and independent interpretation performed (see above or ED course)      Risk  Prescription drug management   Shared decision making     Critical Care  none    I, Namrata Stokes MD personally performed the history, PE, MDM, and procedures as documented above and agree with the scribe's documentation.                                     Clinical Impression:  Final diagnoses:  [M79.661, M79.89] Pain and swelling of right lower leg  [S99.921A] Right foot injury, initial encounter  [S99.922A] Foot injury, left, initial encounter  [S92.314A] Nondisplaced fracture of first metatarsal bone, right foot, initial encounter for closed fracture  [L03.115] Cellulitis of right foot  [S90.211A] Subungual hematoma of great toe of right foot, initial encounter  [M79.5] Foreign body (FB) in soft tissue - Suspected nonacute, left heel  [R50.9] Fever, unspecified fever cause          ED Disposition Condition    Discharge Stable          ED Prescriptions       Medication Sig Dispense Start Date End Date Auth. Provider    HYDROcodone-acetaminophen (NORCO) 7.5-325 mg per tablet Take 1 tablet by mouth every 6 (six) hours as needed for Pain. 18 tablet 4/7/2024 4/12/2024 Namrata Stokes MD    cephALEXin (KEFLEX) 500 MG capsule Take 1 capsule (500 mg total) by mouth 4 (four) times daily. for 10 days 40 capsule 4/7/2024 4/17/2024 Namrata Stokes MD    bacitracin 500 unit/gram Oint Apply topically 3 (three) times daily. for 10 days 30 g 4/7/2024 4/17/2024 Namrata Stokes MD          Follow-up Information       Follow up With Specialties Details Why Contact Info    Graham Moya MD Orthopedic Surgery Schedule an appointment as soon as possible for a visit   [address] [facility] [address] [phone]      Yana Pearce MD Internal Medicine Schedule an appointment as soon as possible for a visit   [address] [place] [id number]  [phone]       [facility] - Emergency Dept Emergency Medicine  As needed, If symptoms worsen [address] [facility] [id number] [phone]             Namrata Stokes MD  04/10/24 1532

## 2024-04-08 ENCOUNTER — PATIENT OUTREACH (OUTPATIENT)
Dept: EMERGENCY MEDICINE | Facility: HOSPITAL | Age: 69
End: 2024-04-08
Payer: MEDICARE

## 2024-04-08 NOTE — PROGRESS NOTES
Hilaria Moya  ED Navigator  Emergency Department    [place] [facility] [place]  Role: Community Health Worker    Date: 04/08/2024  Patient Name: Daisy Calderon  MRN: [id number]  PCP: Yana Pearce MD    Assessment:     Daisy Calderon is a 68 y.o. female who has presented to ED for swelling of right lower leg. Patient has visited the ED 1 times in the past 3 months. Patient did not contact PCP.     ED Navigator Initial Assessment    ED Navigator Enrollment Documentation  Consent to Services  Does patient consent to completing the assessment?: Yes  Contact  Method of Initial Contact: Phone  Transportation  Does the patient have issues with Transportation?: No  Does the patient have transportation to and from healthcare appointments?: Yes  Insurance Coverage  Do you have coverage/adequate coverage?: Yes  Type/kind of coverage: [employer] Managed [employer]/ [employer] HMO  Specialist Appointment  PCP Follow Up Appointment  Medications  Is patient able to afford medication?: Yes  Is patient unable to get medication due to lack of transportation?: No  Psychological  Food  Communication/Education  Other Financial Concerns  Other Social Barriers/Concerns  Primary Barrier  Barriers identified: Structural barrier (service availability, waiting times, etc.)  Root Cause of ED Utilization: Chronic Conditions  Plan to address Chronic Conditions: Encourage patient to contact PCP/specialist for follow up per ED discharge instructions (Comment: Patient transferred to orthopedic  to schedule appointment.)  Next steps: Provided Education  Was education/educational materials provided surrounding PCP services/creating a medical home?: Yes Was education verbal or written?: Verbal     Was education/educational materials provided surrounding low cost, healthy foods?: Yes Was education verbal or written?: Verbal     Was education/educational materials provided surrounding other items? If so, use comment to  explain.: Yes Was education verbal or written?: Verbal            Social History     Socioeconomic History    Marital status: Single   Tobacco Use    Smoking status: Every Day     Current packs/day: 0.25     Types: Cigarettes    Smokeless tobacco: Never   Substance and Sexual Activity    Alcohol use: Yes     Alcohol/week: 6.0 standard drinks of alcohol     Types: 6 Cans of beer per week     Social Determinants of Health     Financial Resource Strain: Low Risk  (3/4/2024)    Overall Financial Resource Strain (CARDIA)     Difficulty of Paying Living Expenses: Not hard at all   Food Insecurity: No Food Insecurity (3/4/2024)    Hunger Vital Sign     Worried About Running Out of Food in the Last Year: Never true     Ran Out of Food in the Last Year: Never true   Transportation Needs: No Transportation Needs (4/8/2024)    PRAPARE - Transportation     Lack of Transportation (Medical): No     Lack of Transportation (Non-Medical): No   Physical Activity: Sufficiently Active (7/26/2023)    Exercise Vital Sign     Days of Exercise per Week: 5 days     Minutes of Exercise per Session: 30 min   Stress: No Stress Concern Present (7/26/2023)    [employer] [place] [employer] - Occupational Stress Questionnaire     Feeling of Stress : Not at all   Social Connections: Moderately Isolated (3/4/2024)    Social Connection and Isolation Panel [NHANES]     Frequency of Communication with Friends and Family: Twice a week     Frequency of Social Gatherings with Friends and Family: Once a week     Attends Restoration Services: More than 4 times per year     Active Member of Clubs or Organizations: No     Attends Club or Organization Meetings: Never     Marital Status: Never    Housing Stability: Low Risk  (3/4/2024)    Housing Stability Vital Sign     Unable to Pay for Housing in the Last Year: No     Number of Places Lived in the Last Year: 1     Unstable Housing in the Last Year: No       Plan:     Patient visited the ED on  4/7/2024.  ED Navigator unable to schedule a Post ED 7 day orthopedic appointment for patient.  Patient transferred to orthopedic  for further assistance with scheduling appointment.

## 2024-04-12 RX ORDER — ZOLEDRONIC ACID 5 MG/100ML
5 INJECTION, SOLUTION INTRAVENOUS
Status: CANCELLED | OUTPATIENT
Start: 2024-04-12

## 2024-04-12 RX ORDER — SODIUM CHLORIDE 0.9 % (FLUSH) 0.9 %
10 SYRINGE (ML) INJECTION
Status: CANCELLED | OUTPATIENT
Start: 2024-04-12

## 2024-04-12 RX ORDER — HEPARIN 100 UNIT/ML
500 SYRINGE INTRAVENOUS
Status: CANCELLED | OUTPATIENT
Start: 2024-04-12

## 2024-04-15 ENCOUNTER — OFFICE VISIT (OUTPATIENT)
Dept: INTERNAL MEDICINE | Facility: CLINIC | Age: 69
End: 2024-04-15
Payer: MEDICARE

## 2024-04-15 VITALS
HEART RATE: 81 BPM | BODY MASS INDEX: 18.64 KG/M2 | RESPIRATION RATE: 18 BRPM | WEIGHT: 105.19 LBS | SYSTOLIC BLOOD PRESSURE: 136 MMHG | OXYGEN SATURATION: 99 % | DIASTOLIC BLOOD PRESSURE: 84 MMHG | TEMPERATURE: 97 F

## 2024-04-15 DIAGNOSIS — L03.119 CELLULITIS OF LOWER EXTREMITY, UNSPECIFIED LATERALITY: Primary | ICD-10-CM

## 2024-04-15 DIAGNOSIS — S92.314A CLOSED NONDISPLACED FRACTURE OF FIRST METATARSAL BONE OF RIGHT FOOT, INITIAL ENCOUNTER: ICD-10-CM

## 2024-04-15 DIAGNOSIS — S90.221A SUBUNGUAL HEMATOMA OF TOE OF RIGHT FOOT, INITIAL ENCOUNTER: ICD-10-CM

## 2024-04-15 PROCEDURE — 3008F BODY MASS INDEX DOCD: CPT | Mod: CPTII,,,

## 2024-04-15 PROCEDURE — 1160F RVW MEDS BY RX/DR IN RCRD: CPT | Mod: CPTII,,,

## 2024-04-15 PROCEDURE — 1101F PT FALLS ASSESS-DOCD LE1/YR: CPT | Mod: CPTII,,,

## 2024-04-15 PROCEDURE — 3288F FALL RISK ASSESSMENT DOCD: CPT | Mod: CPTII,,,

## 2024-04-15 PROCEDURE — 3075F SYST BP GE 130 - 139MM HG: CPT | Mod: CPTII,,,

## 2024-04-15 PROCEDURE — 3044F HG A1C LEVEL LT 7.0%: CPT | Mod: CPTII,,,

## 2024-04-15 PROCEDURE — 99214 OFFICE O/P EST MOD 30 MIN: CPT | Mod: ,,,

## 2024-04-15 PROCEDURE — 3079F DIAST BP 80-89 MM HG: CPT | Mod: CPTII,,,

## 2024-04-15 PROCEDURE — 1159F MED LIST DOCD IN RCRD: CPT | Mod: CPTII,,,

## 2024-04-15 PROCEDURE — 1125F AMNT PAIN NOTED PAIN PRSNT: CPT | Mod: CPTII,,,

## 2024-04-15 RX ORDER — TRAMADOL HYDROCHLORIDE 50 MG/1
50 TABLET ORAL EVERY 8 HOURS PRN
Qty: 21 TABLET | Refills: 0 | Status: SHIPPED | OUTPATIENT
Start: 2024-04-15 | End: 2024-04-22

## 2024-04-15 RX ORDER — MUPIROCIN 20 MG/G
OINTMENT TOPICAL 3 TIMES DAILY
Qty: 15 G | Refills: 0 | Status: SHIPPED | OUTPATIENT
Start: 2024-04-15 | End: 2024-04-22

## 2024-04-15 RX ORDER — CLINDAMYCIN HYDROCHLORIDE 300 MG/1
300 CAPSULE ORAL 3 TIMES DAILY
Qty: 21 CAPSULE | Refills: 0 | Status: SHIPPED | OUTPATIENT
Start: 2024-04-15 | End: 2024-04-22

## 2024-04-15 NOTE — PROGRESS NOTES
Patient ID: Daisy Calderon is a 68 y.o. female.    Chief Complaint: Foot Injury (Cement blocks fell on foot )    63-year-old female with history of type 1 diabetes mellitus and hypothyroidism here today for an ER follow-up visit.  Patient recently presented to ER 2024 for bilateral foot swelling and redness after having concrete blocks fall onto her feet while gardening.  X-ray right foot indicated nondisplaced distal 1st metatarsal fracture; x-ray left foot indicated thin radiopaque foreign body in heel soft tissues, no acute fracture.  Ultimately discharged with metatarsal fx managed with flat soled shoe, crutches to WBAT, and oral analgesics. L foot  metallic foreign body noted without signs of recent penetrating injury or tenderness at the site.  Noted no indication for emergent removal, could follow up with surgery clinic if desires removal in the future.  Bilateral lower extremities also noted with erythema and multiple abrasions with possible cellulitis initiated on bacitracin and cephalexin.  Today reports doing well however with persistent foot pain, worsened when weight-bearing.  Right great toenail also with; hematoma causing some discomfort, does have serosanguineous discharge.  Bilateral lower extremities also with continued erythema, no fevers or chills.  Denies upcoming appointments with Podiatry or Orthopedics, stressed importance of obtaining close follow-up.          MEDICAL HISTORY:    Past Medical History:   Diagnosis Date    Diabetes type I     HTN (hypertension)     Hypothyroidism, unspecified     Tobacco user       Past Surgical History:   Procedure Laterality Date    CARPAL TUNNEL RELEASE      CATARACT EXTRACTION       SECTION      CHOLECYSTECTOMY      TOTAL ABDOMINAL HYSTERECTOMY      TUBAL LIGATION        Social History     Tobacco Use    Smoking status: Every Day     Current packs/day: 0.25     Types: Cigarettes    Smokeless tobacco: Never   Substance Use Topics     Alcohol use: Yes     Alcohol/week: 6.0 standard drinks of alcohol     Types: 6 Cans of beer per week          Health Maintenance Due   Topic Date Due    Hepatitis C Screening  Never done    Low Dose Statin  Never done    Shingles Vaccine (1 of 2) Never done    TETANUS VACCINE  10/25/2015    RSV Vaccine (Age 60+ and Pregnant patients) (1 - 1-dose 60+ series) Never done    Diabetes Urine Screening  01/26/2024    Eye Exam  06/09/2024          Patient Care Team:  Yana Pearce MD as PCP - General (Internal Medicine)  Freeman Beltrán MD as Consulting Physician (Endocrinology)  Heidi Pringle, EL as Registered Nurse (Diagnostic Radiology)  Hilaria Moya as ED Navigator      Review of Systems   Constitutional:  Negative for fatigue and fever.   HENT:  Negative for congestion, rhinorrhea, sore throat and trouble swallowing.    Eyes:  Negative for redness and visual disturbance.   Respiratory:  Negative for cough, chest tightness and shortness of breath.    Cardiovascular:  Negative for chest pain and palpitations.   Gastrointestinal:  Negative for abdominal pain, constipation, diarrhea, nausea and vomiting.   Genitourinary:  Negative for dysuria, flank pain, frequency and urgency.   Musculoskeletal:  Positive for arthralgias and gait problem. Negative for myalgias.   Skin:  Positive for color change and wound. Negative for rash.   Neurological:  Negative for facial asymmetry, speech difficulty, weakness and headaches.   All other systems reviewed and are negative.      Objective:   /84 (BP Location: Left arm, Patient Position: Sitting, BP Method: Small (Automatic))   Pulse 81   Temp 96.9 °F (36.1 °C) (Temporal)   Resp 18   Wt 47.7 kg (105 lb 3.2 oz)   SpO2 99%   BMI 18.64 kg/m²      Physical Exam  Constitutional:       General: She is not in acute distress.     Appearance: Normal appearance.   HENT:      Right Ear: Tympanic membrane, ear canal and external ear normal.      Left Ear: Tympanic  membrane, ear canal and external ear normal.      Nose: Nose normal.      Mouth/Throat:      Mouth: Mucous membranes are moist.      Pharynx: Oropharynx is clear.   Eyes:      Extraocular Movements: Extraocular movements intact.      Conjunctiva/sclera: Conjunctivae normal.      Pupils: Pupils are equal, round, and reactive to light.   Cardiovascular:      Rate and Rhythm: Normal rate and regular rhythm.      Pulses: Normal pulses.      Heart sounds: Normal heart sounds. No murmur heard.     No gallop.   Pulmonary:      Effort: Pulmonary effort is normal.      Breath sounds: Normal breath sounds. No wheezing.   Abdominal:      General: Bowel sounds are normal. There is no distension.      Palpations: Abdomen is soft. There is no mass.      Tenderness: There is no abdominal tenderness. There is no guarding.   Musculoskeletal:         General: Normal range of motion.   Feet:      Right foot:      Skin integrity: Erythema present.      Left foot:      Skin integrity: Erythema present.      Toenail Condition: Left toenails are abnormally thick.   Skin:     General: Skin is warm and dry.      Findings: Abrasion, erythema and signs of injury present.   Neurological:      Mental Status: She is alert. Mental status is at baseline.      Sensory: No sensory deficit.      Motor: No weakness.                     Assessment:       ICD-10-CM ICD-9-CM   1. Cellulitis of lower extremity, unspecified laterality  L03.119 682.6   2. Closed nondisplaced fracture of first metatarsal bone of right foot, initial encounter  S92.314A 825.25   3. Subungual hematoma of toe of right foot, initial encounter  S90.221A 924.3        Plan:     Problem List Items Addressed This Visit          ID    Cellulitis of lower extremity - Primary    Relevant Medications    clindamycin (CLEOCIN) 300 MG capsule    mupirocin (BACTROBAN) 2 % ointment       Orthopedic    Closed nondisplaced fracture of first right metatarsal bone    Relevant Medications     traMADoL (ULTRAM) 50 mg tablet    Other Relevant Orders    Ambulatory referral/consult to Podiatry    Subungual hematoma of toe of right foot    Relevant Medications    traMADoL (ULTRAM) 50 mg tablet    Other Relevant Orders    Ambulatory referral/consult to Podiatry          Follow up in about 1 week (around 4/22/2024) for General Checkup, Present to ER/Urgent Care if symtoms worsen.   -plan specifics discussed above    Orders Placed This Encounter    Ambulatory referral/consult to Podiatry    clindamycin (CLEOCIN) 300 MG capsule    traMADoL (ULTRAM) 50 mg tablet    mupirocin (BACTROBAN) 2 % ointment        Medication List with Changes/Refills   New Medications    CLINDAMYCIN (CLEOCIN) 300 MG CAPSULE    Take 1 capsule (300 mg total) by mouth 3 (three) times daily. for 7 days    MUPIROCIN (BACTROBAN) 2 % OINTMENT    Apply topically 3 (three) times daily. for 7 days    TRAMADOL (ULTRAM) 50 MG TABLET    Take 1 tablet (50 mg total) by mouth every 8 (eight) hours as needed for Pain.   Current Medications    INSULIN ASPART U-100 (NOVOLOG) 100 UNIT/ML INJECTION    Inject 67 Units into the skin Daily. Average taken 34 units daily    LACTOBACILLUS RHAMNOSUS GG (CULTURELLE) 10 BILLION CELL CAPSULE    Take 1 capsule by mouth daily as needed.    LATANOPROST 0.005 % OPHTHALMIC SOLUTION    Apply 1 drop to eye every evening.    LEVOTHYROXINE (SYNTHROID) 125 MCG TABLET    Take 125 mcg by mouth before breakfast.    LISINOPRIL 10 MG TABLET    Take 10 mg by mouth once daily.    MULTIVITAMIN (ONE DAILY MULTIVITAMIN) PER TABLET    Take 1 tablet by mouth once daily.    ONDANSETRON (ZOFRAN) 4 MG TABLET    Take 1 tablet (4 mg total) by mouth every 6 (six) hours as needed for Nausea.    ZOLEDRONIC ACID-MANNITOL & WATER (RECLAST) 5 MG/100 ML PGBK    Inject 5 mg into the vein once.   Discontinued Medications    BACITRACIN 500 UNIT/GRAM OINT    Apply topically 3 (three) times daily. for 10 days    CEPHALEXIN (KEFLEX) 500 MG CAPSULE    Take 1  capsule (500 mg total) by mouth 4 (four) times daily. for 10 days

## 2024-04-16 PROBLEM — S90.221A SUBUNGUAL HEMATOMA OF TOE OF RIGHT FOOT: Status: ACTIVE | Noted: 2024-04-16

## 2024-04-16 PROBLEM — S92.314A CLOSED NONDISPLACED FRACTURE OF FIRST RIGHT METATARSAL BONE: Status: ACTIVE | Noted: 2024-04-16

## 2024-04-16 PROBLEM — L03.119 CELLULITIS OF LOWER EXTREMITY: Status: ACTIVE | Noted: 2024-04-16

## 2024-04-22 ENCOUNTER — OFFICE VISIT (OUTPATIENT)
Dept: INTERNAL MEDICINE | Facility: CLINIC | Age: 69
End: 2024-04-22
Payer: MEDICARE

## 2024-04-22 VITALS
HEART RATE: 78 BPM | TEMPERATURE: 99 F | WEIGHT: 106 LBS | SYSTOLIC BLOOD PRESSURE: 132 MMHG | BODY MASS INDEX: 18.78 KG/M2 | RESPIRATION RATE: 18 BRPM | DIASTOLIC BLOOD PRESSURE: 80 MMHG | OXYGEN SATURATION: 97 %

## 2024-04-22 DIAGNOSIS — L03.119 CELLULITIS OF LOWER EXTREMITY, UNSPECIFIED LATERALITY: Primary | ICD-10-CM

## 2024-04-22 DIAGNOSIS — S92.314D CLOSED NONDISPLACED FRACTURE OF FIRST METATARSAL BONE OF RIGHT FOOT WITH ROUTINE HEALING, SUBSEQUENT ENCOUNTER: ICD-10-CM

## 2024-04-22 DIAGNOSIS — L98.9 SKIN LESION OF LEFT LEG: ICD-10-CM

## 2024-04-22 PROCEDURE — 1159F MED LIST DOCD IN RCRD: CPT | Mod: CPTII,,,

## 2024-04-22 PROCEDURE — 1160F RVW MEDS BY RX/DR IN RCRD: CPT | Mod: CPTII,,,

## 2024-04-22 PROCEDURE — 3044F HG A1C LEVEL LT 7.0%: CPT | Mod: CPTII,,,

## 2024-04-22 PROCEDURE — 3288F FALL RISK ASSESSMENT DOCD: CPT | Mod: CPTII,,,

## 2024-04-22 PROCEDURE — 3079F DIAST BP 80-89 MM HG: CPT | Mod: CPTII,,,

## 2024-04-22 PROCEDURE — 1125F AMNT PAIN NOTED PAIN PRSNT: CPT | Mod: CPTII,,,

## 2024-04-22 PROCEDURE — 1101F PT FALLS ASSESS-DOCD LE1/YR: CPT | Mod: CPTII,,,

## 2024-04-22 PROCEDURE — 3008F BODY MASS INDEX DOCD: CPT | Mod: CPTII,,,

## 2024-04-22 PROCEDURE — 3075F SYST BP GE 130 - 139MM HG: CPT | Mod: CPTII,,,

## 2024-04-22 PROCEDURE — 99214 OFFICE O/P EST MOD 30 MIN: CPT | Mod: ,,,

## 2024-04-22 NOTE — Clinical Note
Patient was previously referred to Podiatry for foot fracture, please follow-up ensure that she has not upcoming appointment for management

## 2024-04-22 NOTE — PROGRESS NOTES
Patient ID: Daisy Calderon is a 68 y.o. female.    Chief Complaint: Follow-up (Pt doing well )    68-year-old female with history of type 1 diabetes mellitus and hypothyroidism here today for a one-week follow-up visit.  Last visit seen for an ER follow-up for a metatarsal fx managed with flat soled shoe, crutches to WBAT, and oral analgesics.  Patient was noted to have Bilateral lower extremities cellulitis with erythema and multiple abrasions despite treating with bacitracin and cephalexin.  Subsequently initiated on clindamycin instead.  Today bilateral lower extremities look much improved with reduction in redness and size of abrasions.  No fevers or chills.  Still awaiting upcoming appointments with Podiatry, patient was stressed importance of obtaining close follow-up.  Otherwise stable for today's visit          MEDICAL HISTORY:    Past Medical History:   Diagnosis Date    Diabetes type I     HTN (hypertension)     Hypothyroidism, unspecified     Tobacco user       Past Surgical History:   Procedure Laterality Date    CARPAL TUNNEL RELEASE      CATARACT EXTRACTION       SECTION      CHOLECYSTECTOMY      TOTAL ABDOMINAL HYSTERECTOMY      TUBAL LIGATION        Social History     Tobacco Use    Smoking status: Every Day     Current packs/day: 0.25     Types: Cigarettes    Smokeless tobacco: Never   Substance Use Topics    Alcohol use: Yes     Alcohol/week: 6.0 standard drinks of alcohol     Types: 6 Cans of beer per week          Health Maintenance Due   Topic Date Due    Hepatitis C Screening  Never done    Low Dose Statin  Never done    Shingles Vaccine (1 of 2) Never done    TETANUS VACCINE  10/25/2015    RSV Vaccine (Age 60+ and Pregnant patients) (1 - 1-dose 60+ series) Never done    Diabetes Urine Screening  2024    Eye Exam  2024          Patient Care Team:  Yana Pearce MD as PCP - General (Internal Medicine)  Freeman Beltrán MD as Consulting Physician  (Endocrinology)  Heidi Pringle, RN as Registered Nurse (Diagnostic Radiology)  Hilaria Moya as ED Navigator      Review of Systems   Constitutional:  Negative for fatigue and fever.   HENT:  Negative for congestion, rhinorrhea, sore throat and trouble swallowing.    Eyes:  Negative for redness and visual disturbance.   Respiratory:  Negative for cough, chest tightness and shortness of breath.    Cardiovascular:  Negative for chest pain and palpitations.   Gastrointestinal:  Negative for abdominal pain, constipation, diarrhea, nausea and vomiting.   Genitourinary:  Negative for dysuria, flank pain, frequency and urgency.   Musculoskeletal:  Positive for arthralgias and gait problem. Negative for myalgias.   Skin:  Positive for wound. Negative for color change and rash.   Neurological:  Negative for facial asymmetry, speech difficulty, weakness and headaches.   All other systems reviewed and are negative.      Objective:   /80 (BP Location: Right arm, Patient Position: Sitting, BP Method: Small (Automatic))   Pulse 78   Temp 98.5 °F (36.9 °C) (Temporal)   Resp 18   Wt 48.1 kg (106 lb)   SpO2 97%   BMI 18.78 kg/m²      Physical Exam  Constitutional:       General: She is not in acute distress.     Appearance: Normal appearance.   HENT:      Right Ear: Tympanic membrane, ear canal and external ear normal.      Left Ear: Tympanic membrane, ear canal and external ear normal.      Nose: Nose normal.      Mouth/Throat:      Mouth: Mucous membranes are moist.      Pharynx: Oropharynx is clear.   Eyes:      Extraocular Movements: Extraocular movements intact.      Conjunctiva/sclera: Conjunctivae normal.      Pupils: Pupils are equal, round, and reactive to light.   Cardiovascular:      Rate and Rhythm: Normal rate and regular rhythm.      Pulses: Normal pulses.      Heart sounds: Normal heart sounds. No murmur heard.     No gallop.   Pulmonary:      Effort: Pulmonary effort is normal.      Breath  sounds: Normal breath sounds. No wheezing.   Abdominal:      General: Bowel sounds are normal. There is no distension.      Palpations: Abdomen is soft. There is no mass.      Tenderness: There is no abdominal tenderness. There is no guarding.   Musculoskeletal:         General: Normal range of motion.   Feet:      Right foot:      Skin integrity: Erythema present.      Left foot:      Skin integrity: Erythema present.      Toenail Condition: Left toenails are abnormally thick.   Skin:     General: Skin is warm and dry.      Findings: Abrasion, erythema (much improved) and signs of injury present.   Neurological:      Mental Status: She is alert. Mental status is at baseline.      Sensory: No sensory deficit.      Motor: No weakness.                 Assessment:       ICD-10-CM ICD-9-CM   1. Cellulitis of lower extremity, unspecified laterality  L03.119 682.6   2. Closed nondisplaced fracture of first metatarsal bone of right foot with routine healing, subsequent encounter  S92.314D V54.19   3. Skin lesion of left leg  L98.9 709.9          Plan:     Problem List Items Addressed This Visit          Derm    Skin lesion of left leg    Relevant Orders    Ambulatory referral/consult to Dermatology       ID    Cellulitis of lower extremity - Primary     -much improved/resolved  -completed clindamycin  -currently utilizing mupirocin b.i.d., continue until abrasions healed              Orthopedic    Closed nondisplaced fracture of first right metatarsal bone     -managed with flat shoe and crutches, continue  -weight-bearing as tolerated, continue  -previously referred to Podiatry for fracture, follow-up on referral                 Follow up for Previously scheduled and PRN if need.   -plan specifics discussed above    Orders Placed This Encounter    Ambulatory referral/consult to Dermatology          Medication List with Changes/Refills   Current Medications    INSULIN ASPART U-100 (NOVOLOG) 100 UNIT/ML INJECTION    Inject  67 Units into the skin Daily. Average taken 34 units daily    LACTOBACILLUS RHAMNOSUS GG (CULTURELLE) 10 BILLION CELL CAPSULE    Take 1 capsule by mouth daily as needed.    LATANOPROST 0.005 % OPHTHALMIC SOLUTION    Apply 1 drop to eye every evening.    LEVOTHYROXINE (SYNTHROID) 125 MCG TABLET    Take 125 mcg by mouth before breakfast.    LISINOPRIL 10 MG TABLET    Take 10 mg by mouth once daily.    MULTIVITAMIN (ONE DAILY MULTIVITAMIN) PER TABLET    Take 1 tablet by mouth once daily.    ONDANSETRON (ZOFRAN) 4 MG TABLET    Take 1 tablet (4 mg total) by mouth every 6 (six) hours as needed for Nausea.    ZOLEDRONIC ACID-MANNITOL & WATER (RECLAST) 5 MG/100 ML PGBK    Inject 5 mg into the vein once.

## 2024-04-26 ENCOUNTER — TELEPHONE (OUTPATIENT)
Dept: INTERNAL MEDICINE | Facility: CLINIC | Age: 69
End: 2024-04-26
Payer: MEDICARE

## 2024-04-26 PROBLEM — L98.9 SKIN LESION OF LEFT LEG: Status: ACTIVE | Noted: 2024-04-26

## 2024-04-26 NOTE — ASSESSMENT & PLAN NOTE
-managed with flat shoe and crutches, continue  -weight-bearing as tolerated, continue  -previously referred to Podiatry for fracture, follow-up on referral

## 2024-04-26 NOTE — TELEPHONE ENCOUNTER
----- Message from KENNY Evans sent at 4/26/2024 11:14 AM CDT -----  Patient was previously referred to Podiatry for foot fracture, please follow-up ensure that she has not upcoming appointment for management

## 2024-04-26 NOTE — ASSESSMENT & PLAN NOTE
-much improved/resolved  -completed clindamycin  -currently utilizing mupirocin b.i.d., continue until abrasions healed

## 2024-04-29 NOTE — TELEPHONE ENCOUNTER
Spoke with pt and she sated that she contacted our offices by accident. She stated she's fine and doesn't need assistance at the moment.

## 2024-06-10 PROBLEM — E10.10 DIABETIC KETOACIDOSIS WITHOUT COMA ASSOCIATED WITH TYPE 1 DIABETES MELLITUS: Status: RESOLVED | Noted: 2024-01-17 | Resolved: 2024-06-10

## 2024-06-13 ENCOUNTER — INFUSION (OUTPATIENT)
Dept: INFUSION THERAPY | Facility: HOSPITAL | Age: 69
End: 2024-06-13
Attending: INTERNAL MEDICINE
Payer: MEDICARE

## 2024-06-13 VITALS
OXYGEN SATURATION: 100 % | TEMPERATURE: 98 F | DIASTOLIC BLOOD PRESSURE: 53 MMHG | SYSTOLIC BLOOD PRESSURE: 115 MMHG | RESPIRATION RATE: 18 BRPM | WEIGHT: 104.5 LBS | BODY MASS INDEX: 18.51 KG/M2

## 2024-06-13 DIAGNOSIS — M81.0 OSTEOPOROSIS, UNSPECIFIED OSTEOPOROSIS TYPE, UNSPECIFIED PATHOLOGICAL FRACTURE PRESENCE: Primary | ICD-10-CM

## 2024-06-13 PROCEDURE — 96365 THER/PROPH/DIAG IV INF INIT: CPT

## 2024-06-13 PROCEDURE — 63600175 PHARM REV CODE 636 W HCPCS

## 2024-06-13 RX ORDER — HEPARIN 100 UNIT/ML
500 SYRINGE INTRAVENOUS
OUTPATIENT
Start: 2024-06-13

## 2024-06-13 RX ORDER — ZOLEDRONIC ACID 5 MG/100ML
5 INJECTION, SOLUTION INTRAVENOUS
Status: CANCELLED | OUTPATIENT
Start: 2024-06-13

## 2024-06-13 RX ORDER — ZOLEDRONIC ACID 5 MG/100ML
5 INJECTION, SOLUTION INTRAVENOUS
Status: COMPLETED | OUTPATIENT
Start: 2024-06-13 | End: 2024-06-13

## 2024-06-13 RX ORDER — SODIUM CHLORIDE 0.9 % (FLUSH) 0.9 %
10 SYRINGE (ML) INJECTION
OUTPATIENT
Start: 2024-06-13

## 2024-06-13 RX ADMIN — ZOLEDRONIC ACID 5 MG: 0.05 INJECTION, SOLUTION INTRAVENOUS at 03:06

## 2024-06-17 ENCOUNTER — HOSPITAL ENCOUNTER (OUTPATIENT)
Dept: RADIOLOGY | Facility: HOSPITAL | Age: 69
Discharge: HOME | End: 2024-06-17
Attending: INTERNAL MEDICINE
Payer: MEDICARE

## 2024-06-17 DIAGNOSIS — R92.8 ABNORMAL FINDINGS ON DIAGNOSTIC IMAGING OF BREAST: ICD-10-CM

## 2024-06-17 DIAGNOSIS — R92.8 ABNORMAL FINDINGS ON DIAGNOSTIC IMAGING OF BREAST: Primary | ICD-10-CM

## 2024-06-17 DIAGNOSIS — E03.9 HYPOTHYROIDISM: ICD-10-CM

## 2024-06-17 DIAGNOSIS — E10.3593 TYPE 1 DIABETES MELLITUS WITH PROLIFERATIVE DIABETIC RETINOPATHY WITHOUT MACULAR EDEMA, BILATERAL: ICD-10-CM

## 2024-06-17 PROCEDURE — 19081 BX BREAST 1ST LESION STRTCTC: CPT | Mod: RT,,, | Performed by: STUDENT IN AN ORGANIZED HEALTH CARE EDUCATION/TRAINING PROGRAM

## 2024-06-17 PROCEDURE — A4648 IMPLANTABLE TISSUE MARKER: HCPCS

## 2024-06-17 PROCEDURE — 27201044 MAMMO BREAST BIOPSY WITH IMAGING EA ADD SITE

## 2024-06-17 PROCEDURE — 19082 BX BREAST ADD LESION STRTCTC: CPT | Mod: ,,, | Performed by: STUDENT IN AN ORGANIZED HEALTH CARE EDUCATION/TRAINING PROGRAM

## 2024-06-17 PROCEDURE — 27201044 MAMMO BREAST STEREOTACTIC BIOPSY 1ST SITE COMPLETE

## 2024-06-17 PROCEDURE — 19081 BX BREAST 1ST LESION STRTCTC: CPT | Mod: ,,, | Performed by: STUDENT IN AN ORGANIZED HEALTH CARE EDUCATION/TRAINING PROGRAM

## 2024-06-17 PROCEDURE — 88305 TISSUE EXAM BY PATHOLOGIST: CPT | Performed by: INTERNAL MEDICINE

## 2024-06-19 DIAGNOSIS — D05.10 DCIS (DUCTAL CARCINOMA IN SITU): Primary | ICD-10-CM

## 2024-06-19 LAB — PSYCHE PATHOLOGY RESULT: NORMAL

## 2024-06-27 ENCOUNTER — PATIENT OUTREACH (OUTPATIENT)
Facility: CLINIC | Age: 69
End: 2024-06-27
Payer: MEDICARE

## 2024-06-27 NOTE — PROGRESS NOTES
Population Health Outreach. Health Maintenance Topic(s) Outreach Outcomes & Actions Taken:    Eye Exam - Outreach Outcomes & Actions Taken  : External Records Requested & Care Team Updated if Applicable    Lab(s) - Outreach Outcomes & Actions Taken  : External Records Requested & Care Team Updated if Applicable         Additional Notes:  Wellness: 07/29/24    Record request sent :  [facility]& Dr. Wilson, OD for last DM eye exam   Dr. Freeman Beltrán for last DM urine screening

## 2024-06-27 NOTE — LETTER
AUTHORIZATION FOR RELEASE OF   CONFIDENTIAL INFORMATION    Dear Dr. Wilson  Fax: [phone] ,    We are seeing Daisy Calderon, date of birth 1955, in the clinic at [place] [address] INTERNAL MEDICINE. Yana Pearce MD is the patient's PCP. Daisy Calderon has an outstanding lab/procedure at the time we reviewed her chart. In order to help keep her health information updated, she has authorized us to request the following medical record(s):        (  )  MAMMOGRAM                                      (  )  COLONOSCOPY      (  )  PAP SMEAR                                          (  )  OUTSIDE LAB RESULTS     (  )  DEXA SCAN                                          ( X ) DM EYE EXAM            (  )  FOOT EXAM                                          (  )  ENTIRE RECORD     (  )  OUTSIDE IMMUNIZATIONS                 (  )  _______________         Please fax records to [facility], Bhanushali, Reshma A, MD, [phone]       If you have any questions, please contact Denise at [phone]            Patient Name: Daisy Calderon  : 1955  Patient Phone #: [phone]

## 2024-06-27 NOTE — LETTER
AUTHORIZATION FOR RELEASE OF   CONFIDENTIAL INFORMATION    Dear Dr. Beltrán,    We are seeing Daisy Calderon, date of birth 1955, in the clinic at [place] [address] INTERNAL MEDICINE. Yana Pearce MD is the patient's PCP. Daisy Calderon has an outstanding lab/procedure at the time we reviewed her chart. In order to help keep her health information updated, she has authorized us to request the following medical record(s):        (  )  MAMMOGRAM                                      (  )  COLONOSCOPY      (  )  PAP SMEAR                                          (  )  OUTSIDE LAB RESULTS     (  )  DEXA SCAN                                          (  )  EYE EXAM            (  )  FOOT EXAM                                          (  )  ENTIRE RECORD     (  )  OUTSIDE IMMUNIZATIONS                 ( X )  Diabetic Urine Screening         Please fax records to [facility], Bhanushali, Reshma A, MD, [phone]       If you have any questions, please contact Denise at [phone]            Patient Name: Daisy Calderon  : 1955  Patient Phone #: [phone]

## 2024-06-27 NOTE — PROGRESS NOTES
I received a fax back from [facility] stating patients appt is scheduled for 8/1/24. Reminder set to sent MATT after appt.

## 2024-06-27 NOTE — LETTER
AUTHORIZATION FOR RELEASE OF   CONFIDENTIAL INFORMATION    Dear Dr. Mosqueda,    We are seeing Daisy Calderon, date of birth 1955, in the clinic at [place] [address] INTERNAL MEDICINE. Yana Pearce MD is the patient's PCP. Daisy Calderon has an outstanding lab/procedure at the time we reviewed her chart. In order to help keep her health information updated, she has authorized us to request the following medical record(s):        (  )  MAMMOGRAM                                      (  )  COLONOSCOPY      (  )  PAP SMEAR                                          (  )  OUTSIDE LAB RESULTS     (  )  DEXA SCAN                                          ( X ) DM EYE EXAM            (  )  FOOT EXAM                                          (  )  ENTIRE RECORD     (  )  OUTSIDE IMMUNIZATIONS                 (  )  _______________         Please fax records to [facility], Bhanushali, Reshma A, MD, [phone]       If you have any questions, please contact Denise at [phone]            Patient Name: Daisy Calderon  : 1955  Patient Phone #: [phone]

## 2024-07-01 NOTE — PROGRESS NOTES
[facility] Breast Surg  Breast Surgical Oncology  New Patient Office Visit - H&P      Referring Provider: Dr. Yana Pearce   PCP: Yana Pearce MD     Chief Complaint:   Chief Complaint   Patient presents with    Breast Cancer     Patient reports right LOQ breast lump x 2 weeks swelling, tender to touch        Subjective:       HPI:  Daisy Calderon is a 69 y.o. female who presents on 2024 for evaluation of newly diagnosed right  breast cancer.    A detailed patient history was obtained and reviewed. She currently denies any breast issues including rashes, redness, pain, swelling, nipple discharge, or new lumps/masses.    MG breast density: Category C (heterogeneously dense)     Imagin2023  BL SC MG at [facility]  IMPRESSION: INCOMPLETE: NEEDS ADDITIONAL IMAGING   -In the R breast upper outer quadrant middle depth, there are indeterminate calcifications.  -No other significant masses, calcifications, or other findings are seen in either breast.      2024  R DG MG at [facility]  IMPRESSION: SUSPICIOUS OF MALIGNANCY   -Amorphous calcifications in a regional distribution spanning 5.0 cm in the upper-outer quadrant of the R breast, anterior to middle depths.       Pathology:  2024  Stereotactic guided Core Needle Biopsy Right Breast 10 o'clock Middle Depth Calcifications  -Ductal carcinoma in situ, grade 2 with solid, cribriform and micropapillary architecture, focal comedo necrosis and multifocal microcalcifications  -Background nonproliferative fibrocystic changes with sclerosing adenosis and focal minute sclerosing intraductal papilloma  ER 92%  W[place] 86%    2024  Stereotactic guided Core Needle Biopsy Right Breast 12 o'clock Anterior Depth Calcifications  -Nonproliferative fibrocystic changes with extensive sclerosing adenosis and microcalcifications  -No evidence of significant epithelial hyperplasia atypia or malignancy identified    OB/GYN  History:  Age at Menarche Onset: 14  Menopausal Status: postmenopausal, LMP: No LMP recorded. Patient has had a hysterectomy.  Hysterectomy/Oophorectomy: hysterectomy without BSO, at age 46  Hormonal birth control (duration): No none.   Pregnancy History:   Age at first live birth: 21  Hormone Replacement Therapy: Yes, none  Patient did not breast feed.  Patient denies nipple discharge.   Patient denies to previous breast biopsy.   Patient denies to a personal history of breast cancer.    Other Relevant History:  Prior thoracic RT: none  Genetic testing: No  [employer] descent: No    Family History:  Family History   Problem Relation Name Age of Onset    Breast cancer Sister  65        Past History:  Past Medical History:   Diagnosis Date    Diabetes type I     HTN (hypertension)     Hypothyroidism, unspecified     Tobacco user         Past Surgical History:   Procedure Laterality Date    CARPAL TUNNEL RELEASE      CATARACT EXTRACTION       SECTION      CHOLECYSTECTOMY      TOTAL ABDOMINAL HYSTERECTOMY      TUBAL LIGATION          Social History     Socioeconomic History    Marital status: Single   Tobacco Use    Smoking status: Every Day     Current packs/day: 0.25     Types: Cigarettes    Smokeless tobacco: Never   Substance and Sexual Activity    Alcohol use: Yes     Alcohol/week: 6.0 standard drinks of alcohol     Types: 6 Cans of beer per week     Social Determinants of Health     Financial Resource Strain: Low Risk  (3/4/2024)    Overall Financial Resource Strain (CARDIA)     Difficulty of Paying Living Expenses: Not hard at all   Food Insecurity: No Food Insecurity (3/4/2024)    Hunger Vital Sign     Worried About Running Out of Food in the Last Year: Never true     Ran Out of Food in the Last Year: Never true   Transportation Needs: No Transportation Needs (2024)    PRAPARE - Transportation     Lack of Transportation (Medical): No     Lack of Transportation (Non-Medical): No   Physical  Activity: Sufficiently Active (7/26/2023)    Exercise Vital Sign     Days of Exercise per Week: 5 days     Minutes of Exercise per Session: 30 min   Stress: No Stress Concern Present (7/26/2023)    [employer] - Occupational Stress Questionnaire     Feeling of Stress : Not at all   Housing Stability: Low Risk  (3/4/2024)    Housing Stability Vital Sign     Unable to Pay for Housing in the Last Year: No     Number of Places Lived in the Last Year: 1     Unstable Housing in the Last Year: No        Body mass index is 18.6 kg/m².     Allergy/Medications:   Review of patient's allergies indicates:   Allergen Reactions    Aspirin      Other Reaction(s): Unknown    Levofloxacin      Other reaction(s): itching, swelling    Oxycodone      Other Reaction(s): Unknown    Oxycodone-aspirin     Penicillin      Other Reaction(s): Unknown    Brimonidine Nausea And Vomiting          Current Outpatient Medications:     insulin aspart U-100 (NOVOLOG) 100 unit/mL injection, Inject 67 Units into the skin Daily. Average taken 34 units daily, Disp: , Rfl:     Lactobacillus rhamnosus GG (CULTURELLE) 10 billion cell capsule, Take 1 capsule by mouth daily as needed., Disp: , Rfl:     latanoprost 0.005 % ophthalmic solution, Apply 1 drop to eye every evening., Disp: , Rfl:     levothyroxine (SYNTHROID) 125 MCG tablet, Take 125 mcg by mouth before breakfast., Disp: , Rfl:     lisinopriL 10 MG tablet, Take 10 mg by mouth once daily., Disp: , Rfl:     multivitamin (ONE DAILY MULTIVITAMIN) per tablet, Take 1 tablet by mouth once daily., Disp: , Rfl:     ondansetron (ZOFRAN) 4 MG tablet, Take 1 tablet (4 mg total) by mouth every 6 (six) hours as needed for Nausea., Disp: 30 tablet, Rfl: 0    zoledronic acid-mannitol & water (RECLAST) 5 mg/100 mL PgBk, Inject 5 mg into the vein once., Disp: , Rfl:        Review of Systems:  ROS       Objective:     Vitals:  Blood pressure 129/63, pulse 76, temperature 97.9 °F (36.6  "°C), temperature source Oral, resp. rate 18, height 5' 3" (1.6 m), weight 47.6 kg (105 lb), SpO2 98%.      Physical Exam:  General: The patient is awake, alert and oriented times three. The patient is well nourished and in no acute distress.   Neck: There is no evidence of palpable cervical, supraclavicular or axillary adenopathy. The neck is supple. The thyroid is not enlarged.   Musculoskeletal: The patient has a normal range of motion of her bilateral upper extremities.   Chest: Examination of the chest wall fails to reveal any obvious abnormalities. The lungs are clear to auscultation bilaterally without rales, rhonchi, or wheezing.   Cardiovascular: The heart has a regular rate and rhythm without murmurs, gallops or rubs.  Breast:  Right: Examination of right breast fails to reveal any dominant masses or areas of significant focal nodularity. The nipple is everted without evidence of discharge. There is no skin dimpling with movement of the pectoralis. There are no significant skin changes overlying the breast.   Left: Examination of the left breast fails to reveal any dominant masses or areas of significant focal nodularity. The nipple is everted without evidence of discharge. There is no skin dimpling with movement of the pectoralis. There are no significant skin changes overlying the breast.  Abdomen: The abdomen is soft, flat, nontender and nondistended with no palpable masses or organomegaly.  Integumentary: no rashes or skin lesions present  Neurologic: cranial nerves intact, no signs of peripheral neurological deficit, motor/sensory function intact    Assessment and Discussion:      Cancer Staging   Ductal carcinoma in situ (DCIS) of right breast  Staging form: Breast, AJCC 8th Edition  - Clinical stage from 7/2/2024: Stage 0 (cTis (DCIS), cN0, cM0, ER+, SD+, HER2: Not Assessed) - Signed by Cindy Cohen MD on 7/3/2024        Encounter Diagnoses   Name Primary?    Tobacco user Yes    Ductal " carcinoma in situ (DCIS) of right breast       We discussed her imaging and pathology results in detail     We discussed the need to proceed with local and systemic treatment.      Local Surgical Treatment options:     Breast Surgical Procedures  Breast Conservation Surgery (Partial Mastectomy or Lumpectomy)  Palpation-Guided - removal of breast cancer which is felt on clinical exam  Localization-Guided - required placement of a MagSeed and/or wire (if needed) in the area of concern to allow for identification during surgery. This will take place prior to surgery (usually a few days before). During surgery the MagSeed is detected with a probe and the cancer area is removed along with the MagSeed and/or previously placed clip.   Incisions are usually closed with dissolving stitches, followed by glue and Dermabond.   Mastectomy  Simple - includes removal of breast skin, subcutaneous (fat) tissue, and nipple areolar complex.   Skin-Sparing - includes removal of the breast skin while sparing enough for reconstruction. It also is involves removal of the subcutaneous (fat) tissue and nipple areolar complex.  Nipple Sparing - includes removal of all the breast tissue underneath the nipple, areola, and breast skin is removed. The tissue beneath the nipple and areola are checked for cancer. If cancer is detected, the nipple and areola are then removed.   Surgical Risk include surgical site infections, hematoma or seroma requiring operation, necrosis of nipple-areola and/or mastectomy flaps requiring debridement or hyperbaric therapy, unplanned re-operations, and delay in adjuvant treatments.    Drain placement may be necessary after mastectomy and can remain in place for greater than 10-14 day, occasionally longer.     Axillary Surgical Procedures - would be indicated if final pathology revealed invasive carcinoma  [place] Lymph Node Biopsy  Technetium-99 - a clear substance injected around the nipple and areola on the day  "of surgery prior to surgery starting which provides a sound "road" map to the lymph nodes needing to be removed for further examination.  Blue Dye - either Methylene Blue or Isosulfan is given in the operating room and provides a color "road" map to the lymph nodes needing to be removed for further examination.  MagTrace (Superparamagnetic oxide) - is used to assist with lymph node mapping as well.  Risk - axillary swelling related to bleeding or serous fluid, infection, nerve damage (temporary or permanent), lymphedema (5-6% risk), additional surgery related to final pathology or complications from the procedure  Axillary Lymph Node Dissection  Risk including  the above plus nerve injury which could be permanent and lymphedema risk above 20-25%     Reconstruction Procedures - would need to stop smoking at least 3-4 weeks  Implant- based  Autologous-based  Combination  Immediate versus Delayed  Oncoplastic reduction     The pros and cons of these reconstructive methods were addressed. A second operation maybe required before the final completion of the reconstructive process. I also explained to the patient that the reconstructive options are generally by law required to be covered by insurance and would be considered part of a cancer operation and not a cosmetic operation.     Radiation Treatment Options:  Whole-Breast  Partial Breast      Systemic treatment options:  Hormone Blocker/Endocrine Therapy  Tamoxifen  Raloxifene  Aromatase Inhibitor   Letrozole (Femara)   Anastrozole (Arimidex)   Exemestane (Aromasin)     2. Chemotherapy - Not indicated at this time    3. Immunomodulator & Target Therapies (such as Herceptin) - Not indicated at this time        Plan:     Problem List Items Addressed This Visit          Oncology    Ductal carcinoma in situ (DCIS) of right breast    Current Assessment & Plan     Recommend BL breast MRI - re: evaluation for extent of disease  Follow-up after MRI  Considering her surgical " options, pending size maybe a better candidate for mastectomy based on breast size         Relevant Orders    MRI Breast w/wo Contrast, w/CAD, Bilateral       Other    Tobacco user - Primary    Current Assessment & Plan     Counseled on smoking cessation   Counseled her on increased surgical risk                  All of questions were answered    Cindy Cohen MD  Breast Surgical Oncology     OFFICE VISIT CODING:    Non-face-to-face time included:  Yes Preparing to see the patient such as reviewing the patient record  Yes Obtaining and reviewing separately obtained history  Yes Independently interpreting results  Yes Documenting clinical information in electronic health record  No Ordering appropriate medications  Yes Ordering appropriate tests  Yes Ordering appropriate procedures (including follow-up)  Yes Referring and communicating with other health care professionals (not separately reported)  Yes Care Coordination (not separately reported)    Face-to-face time included:  Yes Performing a medically necessary appropriate history, examination, and/or evaluation  Yes Communicating results to the patient/family/caregiver  Yes Counseling and educating the patient/family/caregiver  Yes Answering patient/family/caregiver questions    Total Time: 65 minutes    Total time includes both face-to-face and non-face-to-face time personally spent by myself on the day of the visit.

## 2024-07-02 ENCOUNTER — OFFICE VISIT (OUTPATIENT)
Dept: SURGERY | Facility: CLINIC | Age: 69
End: 2024-07-02
Payer: MEDICARE

## 2024-07-02 VITALS
TEMPERATURE: 98 F | BODY MASS INDEX: 18.61 KG/M2 | DIASTOLIC BLOOD PRESSURE: 63 MMHG | WEIGHT: 105 LBS | HEIGHT: 63 IN | HEART RATE: 76 BPM | RESPIRATION RATE: 18 BRPM | OXYGEN SATURATION: 98 % | SYSTOLIC BLOOD PRESSURE: 129 MMHG

## 2024-07-02 DIAGNOSIS — Z72.0 TOBACCO USER: Primary | ICD-10-CM

## 2024-07-02 DIAGNOSIS — D05.11 DUCTAL CARCINOMA IN SITU (DCIS) OF RIGHT BREAST: ICD-10-CM

## 2024-07-02 PROCEDURE — 99999 PR PBB SHADOW E&M-EST. PATIENT-LVL V: CPT | Mod: PBBFAC,,, | Performed by: SURGERY

## 2024-07-03 ENCOUNTER — TELEPHONE (OUTPATIENT)
Dept: SURGERY | Facility: CLINIC | Age: 69
End: 2024-07-03
Payer: MEDICARE

## 2024-07-03 PROBLEM — D05.11 DUCTAL CARCINOMA IN SITU (DCIS) OF RIGHT BREAST: Status: ACTIVE | Noted: 2024-07-03

## 2024-07-03 NOTE — ASSESSMENT & PLAN NOTE
Recommend BL breast MRI - re: evaluation for extent of disease  Follow-up after MRI  Considering her surgical options, pending size maybe a better candidate for mastectomy based on breast size

## 2024-07-03 NOTE — NURSING
Breast Nurse Navigator Note    Distress Screening Tool  Distress Score    Distress Score: 1    Met with patient after consult with Dr. Cohen. Patient's daughter present during the consultation.  Referred to the following outside resources: [employer] and [facility]. Verbalized understanding that these resources may provide support throughout the course of her treatment. Patient amenable to receiving additional support and gave her permission to be referred to these organizations.   Breast Cancer Education: Breast Cancer Binder and Nurse Jr given to the patient.   All questions answers to the patient's satisfaction.

## 2024-07-03 NOTE — TELEPHONE ENCOUNTER
Breast MRI scheduled at [facility] on 7/5/2024 with arrival time of 0815. Patient is aware of this appointment.

## 2024-07-17 DIAGNOSIS — I15.2 HYPERTENSION DUE TO ENDOCRINE DISORDER: ICD-10-CM

## 2024-07-17 DIAGNOSIS — Z13.89 SCREENING FOR CARDIOVASCULAR, RESPIRATORY, AND GENITOURINARY DISEASES: ICD-10-CM

## 2024-07-17 DIAGNOSIS — E03.9 ACQUIRED HYPOTHYROIDISM: ICD-10-CM

## 2024-07-17 DIAGNOSIS — E10.3593 TYPE 1 DIABETES MELLITUS WITH PROLIFERATIVE DIABETIC RETINOPATHY WITHOUT MACULAR EDEMA, BILATERAL: Primary | ICD-10-CM

## 2024-07-17 DIAGNOSIS — Z13.6 SCREENING FOR CARDIOVASCULAR, RESPIRATORY, AND GENITOURINARY DISEASES: ICD-10-CM

## 2024-07-17 DIAGNOSIS — Z13.83 SCREENING FOR CARDIOVASCULAR, RESPIRATORY, AND GENITOURINARY DISEASES: ICD-10-CM

## 2024-07-17 DIAGNOSIS — Z13.21 SCREENING FOR ENDOCRINE, NUTRITIONAL, METABOLIC AND IMMUNITY DISORDER: ICD-10-CM

## 2024-07-17 DIAGNOSIS — Z13.228 SCREENING FOR ENDOCRINE, NUTRITIONAL, METABOLIC AND IMMUNITY DISORDER: ICD-10-CM

## 2024-07-17 DIAGNOSIS — Z13.0 SCREENING FOR ENDOCRINE, NUTRITIONAL, METABOLIC AND IMMUNITY DISORDER: ICD-10-CM

## 2024-07-17 DIAGNOSIS — Z13.29 SCREENING FOR ENDOCRINE, NUTRITIONAL, METABOLIC AND IMMUNITY DISORDER: ICD-10-CM

## 2024-07-22 NOTE — H&P (VIEW-ONLY)
[facility] Breast Surg  Breast Surgical Oncology  Follow-Up Patient Office Visit       Referring Provider: No ref. provider found  PCP: Yana Pearce MD   Medical Oncologist: No care team member to display   Radiation Oncologist: No care team member to display   Other Care Providers:     Chief Complaint:   Chief Complaint   Patient presents with    Pre-op Exam     Patient is here for a Pre-Op Eval         Subjective:   Treatment History:  None      Interval History:  2024 - Daisy Calderon presents today for follow up after Breast MRI and surgical discussion.    HPI:  Daisy Calderon is a 69 y.o. female who presents on 2024 for evaluation of newly diagnosed right  breast cancer.     A detailed patient history was obtained and reviewed. She currently denies any breast issues including rashes, redness, pain, swelling, nipple discharge, or new lumps/masses.     MG breast density: Category C (heterogeneously dense)      Imagin2023  BL SC MG at [facility]  IMPRESSION: INCOMPLETE: NEEDS ADDITIONAL IMAGING   -In the R breast upper outer quadrant middle depth, there are indeterminate calcifications.  -No other significant masses, calcifications, or other findings are seen in either breast.       2024  R DG MG at [facility]  IMPRESSION: SUSPICIOUS OF MALIGNANCY   -Amorphous calcifications in a regional distribution spanning 5.0 cm in the upper-outer quadrant of the R breast, anterior to middle depths.      2024  BL BREAST MRI  IMPRESSION: INCOMPLETE: NEEDS ADDITIONAL IMAGING EVALUATION    1.  No suspicious enhancement in either breast.    2.  Right breast 10 o'clock middle to posterior depths metallic clip and surrounding post biopsy changes related to previous stereotactic biopsy of 2024, with pathology revealing ductal carcinoma in-situ.    3.  No suspicious enhancement at the site of recent benign stereotactic biopsy of calcifications at 12 o'clock  right breast anterior depth.  4.  Several prominent level 1 lymph nodes within bilateral axillae with cortical thickening and partial hilar effacement, slightly more prominent in the right axilla compared to the left axilla, are of uncertain clinical significance.      Pathology:  2024  Stereotactic guided Core Needle Biopsy Right Breast 10 o'clock Middle Depth Calcifications  -Ductal carcinoma in situ, grade 2 with solid, cribriform and micropapillary architecture, focal comedo necrosis and multifocal microcalcifications  -Background nonproliferative fibrocystic changes with sclerosing adenosis and focal minute sclerosing intraductal papilloma  ER 92%  MO 86%     2024  Stereotactic guided Core Needle Biopsy Right Breast 12 o'clock Anterior Depth Calcifications  -Nonproliferative fibrocystic changes with extensive sclerosing adenosis and microcalcifications  -No evidence of significant epithelial hyperplasia atypia or malignancy identified     OB/GYN History:  Age at Menarche Onset: 14  Menopausal Status: postmenopausal, LMP: No LMP recorded. Patient has had a hysterectomy.  Hysterectomy/Oophorectomy: hysterectomy without BSO, at age 46  Hormonal birth control (duration): No none.   Pregnancy History:   Age at first live birth: 21  Hormone Replacement Therapy: Yes, none  Patient did not breast feed.  Patient denies nipple discharge.   Patient denies to previous breast biopsy.   Patient denies to a personal history of breast cancer.     Other Relevant History:  Prior thoracic RT: none  Genetic testing: No  Ashkenazi Scientologist descent: No  Family History:  Family History   Problem Relation Name Age of Onset    Breast cancer Sister  65        Past History:  Past Medical History:   Diagnosis Date    Diabetes type I     HTN (hypertension)     Hypothyroidism, unspecified     Tobacco user         Past Surgical History:   Procedure Laterality Date    CARPAL TUNNEL RELEASE      CATARACT EXTRACTION        SECTION      CHOLECYSTECTOMY      TOTAL ABDOMINAL HYSTERECTOMY      TUBAL LIGATION          Social History     Socioeconomic History    Marital status: Single   Tobacco Use    Smoking status: Every Day     Current packs/day: 0.25     Types: Cigarettes    Smokeless tobacco: Never   Substance and Sexual Activity    Alcohol use: Yes     Alcohol/week: 6.0 standard drinks of alcohol     Types: 6 Cans of beer per week     Social Determinants of Health     Financial Resource Strain: Low Risk  (3/4/2024)    Overall Financial Resource Strain (CARDIA)     Difficulty of Paying Living Expenses: Not hard at all   Food Insecurity: No Food Insecurity (3/4/2024)    Hunger Vital Sign     Worried About Running Out of Food in the Last Year: Never true     Ran Out of Food in the Last Year: Never true   Transportation Needs: No Transportation Needs (4/8/2024)    PRAPARE - Transportation     Lack of Transportation (Medical): No     Lack of Transportation (Non-Medical): No   Physical Activity: Sufficiently Active (7/26/2023)    Exercise Vital Sign     Days of Exercise per Week: 5 days     Minutes of Exercise per Session: 30 min   Stress: No Stress Concern Present (7/26/2023)    [employer] - Occupational Stress Questionnaire     Feeling of Stress : Not at all   Housing Stability: Low Risk  (3/4/2024)    Housing Stability Vital Sign     Unable to Pay for Housing in the Last Year: No     Number of Places Lived in the Last Year: 1     Unstable Housing in the Last Year: No        Body mass index is 19.59 kg/m².     Allergy/Medications:   Review of patient's allergies indicates:   Allergen Reactions    Aspirin      Other Reaction(s): Unknown    Levofloxacin      Other reaction(s): itching, swelling    Oxycodone      Other Reaction(s): Unknown    Oxycodone-aspirin     Penicillin      Other Reaction(s): Unknown    Brimonidine Nausea And Vomiting          Current Outpatient Medications:     insulin aspart U-100  "(NOVOLOG) 100 unit/mL injection, Inject 67 Units into the skin Daily. Average taken 34 units daily, Disp: , Rfl:     Lactobacillus rhamnosus GG (CULTURELLE) 10 billion cell capsule, Take 1 capsule by mouth daily as needed., Disp: , Rfl:     latanoprost 0.005 % ophthalmic solution, Apply 1 drop to eye every evening., Disp: , Rfl:     levothyroxine (SYNTHROID) 125 MCG tablet, Take 125 mcg by mouth before breakfast., Disp: , Rfl:     lisinopriL 10 MG tablet, Take 10 mg by mouth once daily., Disp: , Rfl:     multivitamin (ONE DAILY MULTIVITAMIN) per tablet, Take 1 tablet by mouth once daily., Disp: , Rfl:     ondansetron (ZOFRAN) 4 MG tablet, Take 1 tablet (4 mg total) by mouth every 6 (six) hours as needed for Nausea., Disp: 30 tablet, Rfl: 0    zoledronic acid-mannitol & water (RECLAST) 5 mg/100 mL PgBk, Inject 5 mg into the vein once., Disp: , Rfl:        Review of Systems:  ROS        Objective:     Vitals:  Blood pressure 136/70, pulse 77, temperature 98 °F (36.7 °C), temperature source Oral, resp. rate 18, height 5' 3" (1.6 m), weight 50.2 kg (110 lb 9.6 oz), SpO2 98%.      Physical Exam:  General: The patient is awake, alert and oriented times three. The patient is well nourished and in no acute distress.   Neck: There is no evidence of palpable cervical, supraclavicular or axillary adenopathy. The neck is supple. The thyroid is not enlarged.   Musculoskeletal: The patient has a normal range of motion of her bilateral upper extremities.   Chest: Examination of the chest wall fails to reveal any obvious abnormalities. The lungs are clear to auscultation bilaterally without rales, rhonchi, or wheezing.   Cardiovascular: The heart has a regular rate and rhythm without murmurs, gallops or rubs.   Breast:   Right: Examination of right breast fails to reveal any dominant masses or areas of significant focal nodularity. The nipple is everted without evidence of discharge. There is no skin dimpling with movement of the " pectoralis. There is no significant skin changes overlying the breast.   Left: Examination of the left breast fails to reveal any dominant masses or areas of significant focal nodularity. The nipple is everted without evidence of discharge. There is no skin dimpling with movement of the pectoralis. There is no significant skin changes overlying the breast.  Abdomen: The abdomen is soft, flat, nontender and nondistended with no palpable masses or organomegaly.  Integumentary: no rashes or skin lesions present  Neurologic: cranial nerves intact, no signs of peripheral neurological deficit, motor/sensory function intact     Latest Reference Range & Units 04/07/24 14:52   WBC 4.50 - 11.50 x10(3)/mcL 6.13   RBC 4.20 - 5.40 x10(6)/mcL 3.64 (L)   Hemoglobin 12.0 - 16.0 g/dL 12.5   Hematocrit 37.0 - 47.0 % 35.7 (L)   MCV 80.0 - 94.0 fL 98.1 (H)   MCH 27.0 - 31.0 pg 34.3 (H)   MCHC 33.0 - 36.0 g/dL 35.0   RDW 11.5 - 17.0 % 11.3 (L)   Platelet Count 130 - 400 x10(3)/mcL 212   MPV 7.4 - 10.4 fL 9.4   Neut % % 54.4   LYMPH % % 29.5   Mono % % 5.1   Eos % % 10.4   Basophil % % 0.3   Immature Granulocytes % 0.3   Neut # 2.1 - 9.2 x10(3)/mcL 3.33   Lymph # 0.6 - 4.6 x10(3)/mcL 1.81   Mono # 0.1 - 1.3 x10(3)/mcL 0.31   Eos # 0 - 0.9 x10(3)/mcL 0.64   Baso # <=0.2 x10(3)/mcL 0.02   Immature Grans (Abs) 0 - 0.04 x10(3)/mcL 0.02   nRBC % 0.0   Sodium 136 - 145 mmol/L 134 (L)   Potassium 3.5 - 5.1 mmol/L 3.9   Chloride 98 - 107 mmol/L 100   CO2 23 - 31 mmol/L 26   BUN 9.8 - 20.1 mg/dL 10.4   Creatinine 0.55 - 1.02 mg/dL 0.63   eGFR mls/min/1.73/m2 >60   Glucose 82 - 115 mg/dL 152 (H)   Calcium 8.4 - 10.2 mg/dL 9.5   ALP 40 - 150 unit/L 53   PROTEIN TOTAL 5.8 - 7.6 gm/dL 6.2   Albumin 3.4 - 4.8 g/dL 3.7   Albumin/Globulin Ratio 1.1 - 2.0 ratio 1.5   BILIRUBIN TOTAL <=1.5 mg/dL 0.3   AST 5 - 34 unit/L 20   ALT 0 - 55 unit/L 20   Globulin, Total 2.4 - 3.5 gm/dL 2.5   (L): Data is abnormally low  (H): Data is abnormally  high        Assessment and Plan:      Cancer Staging   Ductal carcinoma in situ (DCIS) of right breast  Staging form: Breast, AJCC 8th Edition  - Clinical stage from 7/2/2024: Stage 0 (cTis (DCIS), cN0, cM0, ER+, OK+, HER2: Not Assessed) - Signed by Cindy Cohen MD on 7/3/2024      Encounter Diagnoses   Name Primary?    Ductal carcinoma in situ (DCIS) of right breast Yes        I explained the pathology report of DCIS (ductal carcinoma in-situ) to the patient. This is a noninvasive breast cancer, which means it is still confined to the ducts and not invading the surrounding tissue. We then discussed the need for LOCAL TREATMENT and ADJUVANT HORMONAL TREATMENT.    The LOCAL TREATMENT consists of the options of 1) partial mastectomy (lumpectomy) with radiation or 2) total mastectomy alone.    I then explained to her the procedure of lumpectomy. The rationale for lumpectomy and the need to obtain clear margins, which includes a minimal acceptable margin of at least 2 mm in all directions, were specifically addressed. The necessity of adding radiation following lumpectomy with good margins was explained. The logistics of radiation were discussed at length. The patient will be referred to Radiation Oncology to discuss further details of radiation.    The general operative procedure of mastectomy, the skin sparing nature, and attempts to preserve underlying muscle with a mastectomy were discussed. The options of primary reconstruction following a mastectomy include either implant reconstruction or tissue transfer type of reconstruction. The pros and cons of both of these reconstructive methods were addressed. Both of these are generally performed in stages, and a second operation is usually necessary before the final completion of the reconstructive process. I also explained to the patient that the reconstructive options are generally, by law, required to be covered by insurance and would be considered part of a  cancer operation and not a cosmetic operation.     Sometimes also a reduction or mastopexy is performed on the opposite side to achieve a better match, and this operation by itself is also considered part of the cancer treatment.     I informed her of the complications which include surgical site infections, hematoma or seroma requiring operation, necrosis of nipple-areola and/or mastectomy flaps requiring debridement or hyperbaric therapy, unplanned re-operations, and delay in adjuvant treatments.     Specifically, we went over the local recurrence rate and survival rates with mastectomy and breast conserving therapy, and the indications for postmastectomy radiation in certain subset of patients. If she were to choose the option of mastectomy, then a lymph node mapping procedure would need to be done at that same time. We discussed SLN biopsy in detail and its rationale. If any invasive cancer component is found on lumpectomy, then we would need to stage her lymph nodes with sentinel lymph node mapping. The sentinel lymph node biopsy is generally performed at the time of mastectomy given the breast tissue will be removed and would make lymph node biopsy essentially not possible. If not performed at the time of mastectomy and invasive component found on final pathology an axillary lymph node dissection is generally performed. MagTrace can be used at the time of mastectomy to allow for delayed sentinel lymph node biopsy.       Following this, I have also briefly discussed with her the rationale for ADJUVANT HORMONAL THERAPY. I do not think she will need any chemotherapy, as DCIS is a noninvasive cancer. However, if invasive component is present in the final pathology, details would be needed to determined prior to recommendations for or against chemotherapy.    We discussed the concern for bilateral axillary adenopathy. She does not wish to undergo any additional biopsies of the breast or the axilla. Recommend a  mastectomy and will perform a right axillary lymph node excision at the time of the mastectomy.    I use MagTrace intra-operatively to avoid sentinel lymph node biopsy in DCIS at the time of the operation and will inject this for potential need for sentinel lymph node biopsy.       Plan:         Problem List Items Addressed This Visit          Oncology    Ductal carcinoma in situ (DCIS) of right breast - Primary    Current Assessment & Plan     Surgery - Right simple mastectomy with intra-operative MagTrace, right axillary lymph node excision for flow cytometry  Tentative Date: 8/5/2024  Preop - labs reviewed from 4/7/2024, EKG reviewed from 3/2/2024  Surgical instructions and information were discussed in detail         Relevant Orders    Case Request Operating Room: MASTECTOMY, SIMPLE - RIGHT, EXCISION, LYMPH NODE - RIGHT (Completed)         All of questions were answered    Cindy Cohen MD  Breast Surgical Oncology     OFFICE VISIT CODING:    Non-face-to-face time included:  Yes Preparing to see the patient such as reviewing the patient record  Yes Obtaining and reviewing separately obtained history  Yes Independently interpreting results  Yes Documenting clinical information in electronic health record  No Ordering appropriate medications  Yes Ordering appropriate tests  Yes Ordering appropriate procedures (including follow-up)  Yes Referring and communicating with other health care professionals (not separately reported)  Yes Care Coordination (not separately reported)    Face-to-face time included:  Yes Performing a medically necessary appropriate history, examination, and/or evaluation  Yes Communicating results to the patient/family/caregiver  Yes Counseling and educating the patient/family/caregiver  Yes Answering patient/family/caregiver questions    Total Time: 45 minutes    Total time includes both face-to-face and non-face-to-face time personally spent by myself on the day of the visit.

## 2024-07-22 NOTE — PROGRESS NOTES
[facility] Breast Surg  Breast Surgical Oncology  Follow-Up Patient Office Visit       Referring Provider: No ref. provider found  PCP: Yana Pearce MD   Medical Oncologist: No care team member to display   Radiation Oncologist: No care team member to display   Other Care Providers:     Chief Complaint:   Chief Complaint   Patient presents with    Pre-op Exam     Patient is here for a Pre-Op Eval         Subjective:   Treatment History:  None      Interval History:  2024 - Daisy Calderon presents today for follow up after Breast MRI and surgical discussion.    HPI:  Daisy Calderon is a 69 y.o. female who presents on 2024 for evaluation of newly diagnosed right  breast cancer.     A detailed patient history was obtained and reviewed. She currently denies any breast issues including rashes, redness, pain, swelling, nipple discharge, or new lumps/masses.     MG breast density: Category C (heterogeneously dense)      Imagin2023  BL SC MG at [facility]  IMPRESSION: INCOMPLETE: NEEDS ADDITIONAL IMAGING   -In the R breast upper outer quadrant middle depth, there are indeterminate calcifications.  -No other significant masses, calcifications, or other findings are seen in either breast.       2024  R DG MG at [facility]  IMPRESSION: SUSPICIOUS OF MALIGNANCY   -Amorphous calcifications in a regional distribution spanning 5.0 cm in the upper-outer quadrant of the R breast, anterior to middle depths.      2024  BL BREAST MRI  IMPRESSION: INCOMPLETE: NEEDS ADDITIONAL IMAGING EVALUATION    1.  No suspicious enhancement in either breast.    2.  Right breast 10 o'clock middle to posterior depths metallic clip and surrounding post biopsy changes related to previous stereotactic biopsy of 2024, with pathology revealing ductal carcinoma in-situ.    3.  No suspicious enhancement at the site of recent benign stereotactic biopsy of calcifications at 12 o'clock  right breast anterior depth.  4.  Several prominent level 1 lymph nodes within bilateral axillae with cortical thickening and partial hilar effacement, slightly more prominent in the right axilla compared to the left axilla, are of uncertain clinical significance.      Pathology:  2024  Stereotactic guided Core Needle Biopsy Right Breast 10 o'clock Middle Depth Calcifications  -Ductal carcinoma in situ, grade 2 with solid, cribriform and micropapillary architecture, focal comedo necrosis and multifocal microcalcifications  -Background nonproliferative fibrocystic changes with sclerosing adenosis and focal minute sclerosing intraductal papilloma  ER 92%  [place] 86%     2024  Stereotactic guided Core Needle Biopsy Right Breast 12 o'clock Anterior Depth Calcifications  -Nonproliferative fibrocystic changes with extensive sclerosing adenosis and microcalcifications  -No evidence of significant epithelial hyperplasia atypia or malignancy identified     OB/GYN History:  Age at Menarche Onset: 14  Menopausal Status: postmenopausal, LMP: No LMP recorded. Patient has had a hysterectomy.  Hysterectomy/Oophorectomy: hysterectomy without BSO, at age 46  Hormonal birth control (duration): No none.   Pregnancy History:   Age at first live birth: 21  Hormone Replacement Therapy: Yes, none  Patient did not breast feed.  Patient denies nipple discharge.   Patient denies to previous breast biopsy.   Patient denies to a personal history of breast cancer.     Other Relevant History:  Prior thoracic RT: none  Genetic testing: No  Ashkenazi Jain descent: No  Family History:  Family History   Problem Relation Name Age of Onset    Breast cancer Sister  65        Past History:  Past Medical History:   Diagnosis Date    Diabetes type I     HTN (hypertension)     Hypothyroidism, unspecified     Tobacco user         Past Surgical History:   Procedure Laterality Date    CARPAL TUNNEL RELEASE      CATARACT EXTRACTION        SECTION      CHOLECYSTECTOMY      TOTAL ABDOMINAL HYSTERECTOMY      TUBAL LIGATION          Social History     Socioeconomic History    Marital status: Single   Tobacco Use    Smoking status: Every Day     Current packs/day: 0.25     Types: Cigarettes    Smokeless tobacco: Never   Substance and Sexual Activity    Alcohol use: Yes     Alcohol/week: 6.0 standard drinks of alcohol     Types: 6 Cans of beer per week     Social Determinants of Health     Financial Resource Strain: Low Risk  (3/4/2024)    Overall Financial Resource Strain (CARDIA)     Difficulty of Paying Living Expenses: Not hard at all   Food Insecurity: No Food Insecurity (3/4/2024)    Hunger Vital Sign     Worried About Running Out of Food in the Last Year: Never true     Ran Out of Food in the Last Year: Never true   Transportation Needs: No Transportation Needs (4/8/2024)    PRAPARE - Transportation     Lack of Transportation (Medical): No     Lack of Transportation (Non-Medical): No   Physical Activity: Sufficiently Active (7/26/2023)    Exercise Vital Sign     Days of Exercise per Week: 5 days     Minutes of Exercise per Session: 30 min   Stress: No Stress Concern Present (7/26/2023)    [employer] [place] [employer] - Occupational Stress Questionnaire     Feeling of Stress : Not at all   Housing Stability: Low Risk  (3/4/2024)    Housing Stability Vital Sign     Unable to Pay for Housing in the Last Year: No     Number of Places Lived in the Last Year: 1     Unstable Housing in the Last Year: No        Body mass index is 19.59 kg/m².     Allergy/Medications:   Review of patient's allergies indicates:   Allergen Reactions    Aspirin      Other Reaction(s): Unknown    Levofloxacin      Other reaction(s): itching, swelling    Oxycodone      Other Reaction(s): Unknown    Oxycodone-aspirin     Penicillin      Other Reaction(s): Unknown    Brimonidine Nausea And Vomiting          Current Outpatient Medications:     insulin aspart U-100  "(NOVOLOG) 100 unit/mL injection, Inject 67 Units into the skin Daily. Average taken 34 units daily, Disp: , Rfl:     Lactobacillus rhamnosus GG (CULTURELLE) 10 billion cell capsule, Take 1 capsule by mouth daily as needed., Disp: , Rfl:     latanoprost 0.005 % ophthalmic solution, Apply 1 drop to eye every evening., Disp: , Rfl:     levothyroxine (SYNTHROID) 125 MCG tablet, Take 125 mcg by mouth before breakfast., Disp: , Rfl:     lisinopriL 10 MG tablet, Take 10 mg by mouth once daily., Disp: , Rfl:     multivitamin (ONE DAILY MULTIVITAMIN) per tablet, Take 1 tablet by mouth once daily., Disp: , Rfl:     ondansetron (ZOFRAN) 4 MG tablet, Take 1 tablet (4 mg total) by mouth every 6 (six) hours as needed for Nausea., Disp: 30 tablet, Rfl: 0    zoledronic acid-mannitol & water (RECLAST) 5 mg/100 mL PgBk, Inject 5 mg into the vein once., Disp: , Rfl:        Review of Systems:  ROS        Objective:     Vitals:  Blood pressure 136/70, pulse 77, temperature 98 °F (36.7 °C), temperature source Oral, resp. rate 18, height 5' 3" (1.6 m), weight 50.2 kg (110 lb 9.6 oz), SpO2 98%.      Physical Exam:  General: The patient is awake, alert and oriented times three. The patient is well nourished and in no acute distress.   Neck: There is no evidence of palpable cervical, supraclavicular or axillary adenopathy. The neck is supple. The thyroid is not enlarged.   Musculoskeletal: The patient has a normal range of motion of her bilateral upper extremities.   Chest: Examination of the chest wall fails to reveal any obvious abnormalities. The lungs are clear to auscultation bilaterally without rales, rhonchi, or wheezing.   Cardiovascular: The heart has a regular rate and rhythm without murmurs, gallops or rubs.   Breast:   Right: Examination of right breast fails to reveal any dominant masses or areas of significant focal nodularity. The nipple is everted without evidence of discharge. There is no skin dimpling with movement of the " pectoralis. There is no significant skin changes overlying the breast.   Left: Examination of the left breast fails to reveal any dominant masses or areas of significant focal nodularity. The nipple is everted without evidence of discharge. There is no skin dimpling with movement of the pectoralis. There is no significant skin changes overlying the breast.  Abdomen: The abdomen is soft, flat, nontender and nondistended with no palpable masses or organomegaly.  Integumentary: no rashes or skin lesions present  Neurologic: cranial nerves intact, no signs of peripheral neurological deficit, motor/sensory function intact     Latest Reference Range & Units 04/07/24 14:52   WBC 4.50 - 11.50 x10(3)/mcL 6.13   RBC 4.20 - 5.40 x10(6)/mcL 3.64 (L)   Hemoglobin 12.0 - 16.0 g/dL 12.5   Hematocrit 37.0 - 47.0 % 35.7 (L)   MCV 80.0 - 94.0 fL 98.1 (H)   MCH 27.0 - 31.0 pg 34.3 (H)   MCHC 33.0 - 36.0 g/dL 35.0   RDW 11.5 - 17.0 % 11.3 (L)   Platelet Count 130 - 400 x10(3)/mcL 212   MPV 7.4 - 10.4 fL 9.4   Neut % % 54.4   LYMPH % % 29.5   Mono % % 5.1   Eos % % 10.4   Basophil % % 0.3   Immature Granulocytes % 0.3   Neut # 2.1 - 9.2 x10(3)/mcL 3.33   Lymph # 0.6 - 4.6 x10(3)/mcL 1.81   Mono # 0.1 - 1.3 x10(3)/mcL 0.31   Eos # 0 - 0.9 x10(3)/mcL 0.64   Baso # <=0.2 x10(3)/mcL 0.02   Immature Grans (Abs) 0 - 0.04 x10(3)/mcL 0.02   nRBC % 0.0   Sodium 136 - 145 mmol/L 134 (L)   Potassium 3.5 - 5.1 mmol/L 3.9   Chloride 98 - 107 mmol/L 100   CO2 23 - 31 mmol/L 26   BUN 9.8 - 20.1 mg/dL 10.4   Creatinine 0.55 - 1.02 mg/dL 0.63   eGFR mls/min/1.73/m2 >60   Glucose 82 - 115 mg/dL 152 (H)   Calcium 8.4 - 10.2 mg/dL 9.5   ALP 40 - 150 unit/L 53   PROTEIN TOTAL 5.8 - 7.6 gm/dL 6.2   Albumin 3.4 - 4.8 g/dL 3.7   Albumin/Globulin Ratio 1.1 - 2.0 ratio 1.5   BILIRUBIN TOTAL <=1.5 mg/dL 0.3   AST 5 - 34 unit/L 20   ALT 0 - 55 unit/L 20   Globulin, Total 2.4 - 3.5 gm/dL 2.5   (L): Data is abnormally low  (H): Data is abnormally  high        Assessment and Plan:      Cancer Staging   Ductal carcinoma in situ (DCIS) of right breast  Staging form: Breast, AJCC 8th Edition  - Clinical stage from 7/2/2024: Stage 0 (cTis (DCIS), cN0, cM0, ER+, UT+, HER2: Not Assessed) - Signed by Cindy Cohen MD on 7/3/2024      Encounter Diagnoses   Name Primary?    Ductal carcinoma in situ (DCIS) of right breast Yes        I explained the pathology report of DCIS (ductal carcinoma in-situ) to the patient. This is a noninvasive breast cancer, which means it is still confined to the ducts and not invading the surrounding tissue. We then discussed the need for LOCAL TREATMENT and ADJUVANT HORMONAL TREATMENT.    The LOCAL TREATMENT consists of the options of 1) partial mastectomy (lumpectomy) with radiation or 2) total mastectomy alone.    I then explained to her the procedure of lumpectomy. The rationale for lumpectomy and the need to obtain clear margins, which includes a minimal acceptable margin of at least 2 mm in all directions, were specifically addressed. The necessity of adding radiation following lumpectomy with good margins was explained. The logistics of radiation were discussed at length. The patient will be referred to Radiation Oncology to discuss further details of radiation.    The general operative procedure of mastectomy, the skin sparing nature, and attempts to preserve underlying muscle with a mastectomy were discussed. The options of primary reconstruction following a mastectomy include either implant reconstruction or tissue transfer type of reconstruction. The pros and cons of both of these reconstructive methods were addressed. Both of these are generally performed in stages, and a second operation is usually necessary before the final completion of the reconstructive process. I also explained to the patient that the reconstructive options are generally, by law, required to be covered by insurance and would be considered part of a  cancer operation and not a cosmetic operation.     Sometimes also a reduction or mastopexy is performed on the opposite side to achieve a better match, and this operation by itself is also considered part of the cancer treatment.     I informed her of the complications which include surgical site infections, hematoma or seroma requiring operation, necrosis of nipple-areola and/or mastectomy flaps requiring debridement or hyperbaric therapy, unplanned re-operations, and delay in adjuvant treatments.     Specifically, we went over the local recurrence rate and survival rates with mastectomy and breast conserving therapy, and the indications for postmastectomy radiation in certain subset of patients. If she were to choose the option of mastectomy, then a lymph node mapping procedure would need to be done at that same time. We discussed SLN biopsy in detail and its rationale. If any invasive cancer component is found on lumpectomy, then we would need to stage her lymph nodes with sentinel lymph node mapping. The sentinel lymph node biopsy is generally performed at the time of mastectomy given the breast tissue will be removed and would make lymph node biopsy essentially not possible. If not performed at the time of mastectomy and invasive component found on final pathology an axillary lymph node dissection is generally performed. MagTrace can be used at the time of mastectomy to allow for delayed sentinel lymph node biopsy.       Following this, I have also briefly discussed with her the rationale for ADJUVANT HORMONAL THERAPY. I do not think she will need any chemotherapy, as DCIS is a noninvasive cancer. However, if invasive component is present in the final pathology, details would be needed to determined prior to recommendations for or against chemotherapy.    We discussed the concern for bilateral axillary adenopathy. She does not wish to undergo any additional biopsies of the breast or the axilla. Recommend a  mastectomy and will perform a right axillary lymph node excision at the time of the mastectomy.    I use MagTrace intra-operatively to avoid sentinel lymph node biopsy in DCIS at the time of the operation and will inject this for potential need for sentinel lymph node biopsy.       Plan:         Problem List Items Addressed This Visit          Oncology    Ductal carcinoma in situ (DCIS) of right breast - Primary    Current Assessment & Plan     Surgery - Right simple mastectomy with intra-operative MagTrace, right axillary lymph node excision for flow cytometry  Tentative Date: 8/5/2024  Preop - labs reviewed from 4/7/2024, EKG reviewed from 3/2/2024  Surgical instructions and information were discussed in detail         Relevant Orders    Case Request Operating Room: MASTECTOMY, SIMPLE - RIGHT, EXCISION, LYMPH NODE - RIGHT (Completed)         All of questions were answered    Cindy Cohen MD  Breast Surgical Oncology     OFFICE VISIT CODING:    Non-face-to-face time included:  Yes Preparing to see the patient such as reviewing the patient record  Yes Obtaining and reviewing separately obtained history  Yes Independently interpreting results  Yes Documenting clinical information in electronic health record  No Ordering appropriate medications  Yes Ordering appropriate tests  Yes Ordering appropriate procedures (including follow-up)  Yes Referring and communicating with other health care professionals (not separately reported)  Yes Care Coordination (not separately reported)    Face-to-face time included:  Yes Performing a medically necessary appropriate history, examination, and/or evaluation  Yes Communicating results to the patient/family/caregiver  Yes Counseling and educating the patient/family/caregiver  Yes Answering patient/family/caregiver questions    Total Time: 45 minutes    Total time includes both face-to-face and non-face-to-face time personally spent by myself on the day of the visit.

## 2024-07-23 ENCOUNTER — OFFICE VISIT (OUTPATIENT)
Dept: SURGERY | Facility: CLINIC | Age: 69
End: 2024-07-23
Payer: MEDICARE

## 2024-07-23 VITALS
TEMPERATURE: 98 F | BODY MASS INDEX: 19.6 KG/M2 | DIASTOLIC BLOOD PRESSURE: 70 MMHG | WEIGHT: 110.63 LBS | HEIGHT: 63 IN | OXYGEN SATURATION: 98 % | SYSTOLIC BLOOD PRESSURE: 136 MMHG | RESPIRATION RATE: 18 BRPM | HEART RATE: 77 BPM

## 2024-07-23 DIAGNOSIS — D05.11 DUCTAL CARCINOMA IN SITU (DCIS) OF RIGHT BREAST: Primary | ICD-10-CM

## 2024-07-23 PROCEDURE — 3008F BODY MASS INDEX DOCD: CPT | Mod: CPTII,S$GLB,, | Performed by: SURGERY

## 2024-07-23 PROCEDURE — 3288F FALL RISK ASSESSMENT DOCD: CPT | Mod: CPTII,S$GLB,, | Performed by: SURGERY

## 2024-07-23 PROCEDURE — 3044F HG A1C LEVEL LT 7.0%: CPT | Mod: CPTII,S$GLB,, | Performed by: SURGERY

## 2024-07-23 PROCEDURE — 1160F RVW MEDS BY RX/DR IN RCRD: CPT | Mod: CPTII,S$GLB,, | Performed by: SURGERY

## 2024-07-23 PROCEDURE — 1100F PTFALLS ASSESS-DOCD GE2>/YR: CPT | Mod: CPTII,S$GLB,, | Performed by: SURGERY

## 2024-07-23 PROCEDURE — 1126F AMNT PAIN NOTED NONE PRSNT: CPT | Mod: CPTII,S$GLB,, | Performed by: SURGERY

## 2024-07-23 PROCEDURE — 99215 OFFICE O/P EST HI 40 MIN: CPT | Mod: S$GLB,,, | Performed by: SURGERY

## 2024-07-23 PROCEDURE — 3075F SYST BP GE 130 - 139MM HG: CPT | Mod: CPTII,S$GLB,, | Performed by: SURGERY

## 2024-07-23 PROCEDURE — 1159F MED LIST DOCD IN RCRD: CPT | Mod: CPTII,S$GLB,, | Performed by: SURGERY

## 2024-07-23 PROCEDURE — 3078F DIAST BP <80 MM HG: CPT | Mod: CPTII,S$GLB,, | Performed by: SURGERY

## 2024-07-23 PROCEDURE — 4010F ACE/ARB THERAPY RXD/TAKEN: CPT | Mod: CPTII,S$GLB,, | Performed by: SURGERY

## 2024-07-23 PROCEDURE — 99999 PR PBB SHADOW E&M-EST. PATIENT-LVL IV: CPT | Mod: PBBFAC,,, | Performed by: SURGERY

## 2024-07-23 NOTE — ASSESSMENT & PLAN NOTE
Surgery - Right simple mastectomy with intra-operative MagTrace, right axillary lymph node excision for flow cytometry  Tentative Date: 8/5/2024  Preop - labs reviewed from 4/7/2024, EKG reviewed from 3/2/2024  Surgical instructions and information were discussed in detail

## 2024-07-24 RX ORDER — SODIUM CHLORIDE, SODIUM LACTATE, POTASSIUM CHLORIDE, CALCIUM CHLORIDE 600; 310; 30; 20 MG/100ML; MG/100ML; MG/100ML; MG/100ML
INJECTION, SOLUTION INTRAVENOUS CONTINUOUS
OUTPATIENT
Start: 2024-07-24

## 2024-07-25 ENCOUNTER — PATIENT OUTREACH (OUTPATIENT)
Facility: CLINIC | Age: 69
End: 2024-07-25
Payer: MEDICARE

## 2024-07-25 NOTE — LETTER
AUTHORIZATION FOR RELEASE OF CONFIDENTIAL INFORMATION      2024      Dear Dr. Mosqueda,    We are seeing Daisy Calderon, date of birth 1955, in the clinic at [place] [address] [facility].   Yana Pearce MD is the patient's PCP. Daisy Calderon has an outstanding lab/procedure at the time we reviewed his chart.  In order to help keep her health information updated, Daisy has authorized us to request the following medical record(s):        Eye Exam - including evaluation for diabetic retinopathy              Please fax any records to Yana Pearce MD's at  [phone]        If you have any questions you can contact Tran Bonilla at [phone].             Patient Name: Daisy Calderon    : 1955    Patient Phone #: [phone]                                          Patient/Legal Guardian Signature  This signature was collected at 07/15/2024    Daisy Calderon     Self  _______________________________   Printed Name/Relationship to Patient      Consent for Examination and Treatment: I hereby authorize the providers and employees of [facility] ([facility]) to provide medical treatment/services which includes, but is not limited to, performing and administering tests and diagnostic procedures that are deemed necessary, including, but not limited to, imaging examinations, blood tests and other laboratory procedures as may be required by the hospital, clinic, or may be ordered by my physician(s) or persons working under the general and/or special instructions of my physician(s).      I understand and agree that this consent covers all authorized persons, including but not limited to physicians, residents, nurse practitioners, physicians' assistants, specialists, consultants, student nurses, and independently contracted physicians, who are called upon by the physician in charge, to carry out the diagnostic procedures and medical or surgical treatment.     I hereby  authorize [facility] to retain or dispose of any specimens or tissue, should there be such remaining from any test or procedure.     I hereby authorize and give consent for [facility] providers and employees to take photographs, images or videotapes of such diagnostic, surgical or treatment procedures of Patient as may be required by [facility] or as may be ordered by a physician. I further acknowledge and agree that [facility] may use cameras or other devices for patient monitoring.     I am aware that the practice of medicine is not an exact science, and I acknowledge that no guarantees have been made to me as to the outcome of any tests, procedures or treatment.     Authorization for Release of Information: I understand that my insurance company and/or their agents may need information necessary to make determinations about payment/reimbursement. I hereby provide authorization to release to all insurance companies, their successors, assignees, other parties with whom they may have contracted, or others acting on their behalf, that are involved with payment for any hospital and/or clinic charges incurred by the patient, any information that they request and deem necessary for payment/reimbursement, and/or quality review.  I further authorize the release of my health information to physicians or other health care practitioners on staff who are involved in my health care now and in the future, and to other health care providers, entities, or institutions for the purpose of my continued care and treatment, including referrals.     REGISTRATION AUTHORIZATION  Form No. 48206 (Rev. 3/25/2024)    Page 1 of 3                       Medicare Patient's Certification and Authorization to Release Information and Payment Request:  I certify that the information given by me in applying for payment under Title XVIII of the Social Security Act is correct. I authorize any joe of medical or other information about me to release to the [facility], or its intermediaries or carriers, any information needed for this or a related Medicare claim. I request that payment of authorized benefits be made on my behalf.     Assignment of Insurance Benefits:   I hereby authorize any and all insurance companies, health plans, defined   benefit plans, health insurers or any entity that is or may be responsible for payment of my medical expenses to pay all hospital and medical benefits now due, and to become due and payable to me under any hospital benefits, sick benefits, injury benefits or any other benefit for services rendered to me, including Major Medical Benefits, direct to [facility] and all independently contracted physicians. I assign any and all rights that I may have against any and all insurance companies, health plans, defined benefit plans, health insurers or any entity that is or may be responsible for payment of my medical expenses, including, but not limited to any right to appeal a denial of a claim, any right to bring any action, lawsuit, administrative proceeding, or other cause of action on my behalf. I specifically assign my right to pursue litigation against any and all insurance companies, health plans, defined benefit plans, health insurers or any entity that is or may be responsible for payment of my medical expenses based upon a refusal to pay charges.            E. Valuables: It is understood and agreed that [facility] is not liable for the damage to or loss of any money, jewelry,   documents, dentures, eye glasses, hearing aids, prosthetics, or other property of value.     F. Computer Equipment: I understand and agree that should I choose to use computer equipment owned by [facility] or if I choose to access the Internet via [facility]s network, I do so at my own risk. [facility] is not responsible for any damage to my computer equipment or to any damages of any type that might arise from my loss of equipment or data.     G. Acceptance  of Financial Responsibility:  I agree that in consideration of the services and   supplies that have been   or will be furnished to the patient, I am hereby obligated to pay all charges made for or on the account of the patient according to the standard rates (in effect at the time the services and supplies are delivered) established by [facility], including its Patient Financial Assistance Policy to the extent it is applicable. I understand that I am responsible for all charges, or portions thereof, not covered by insurance or other sources. Patient refunds will be distributed only after balances at all [facility] facilities are paid.     H. Communication Authorization:  I hereby authorize [facility] and its representatives, along with any billing service   or  who may work on their behalf, to contact me on   my cell phone and/or home phone using pre- recorded messages, artificial voice messages, automatic telephone dialing devices or other computer assisted technology, or by electronic      mail, text messaging, or by any other form of electronic communication. This includes, but is not limited to, appointment reminders, yearly physical exam reminders, preventive care reminders, patient campaigns, welcome calls, and calls about account balances on my account or any account on which I am listed as a guarantor. I understand I have the right to opt out of these communications at any time.      Relationship  Between  Facility and  Provider:      I understand that some, but not all, providers furnishing services to the patient are not employees or agents of [facility]. The patient is under the care and supervision of his/her attending physician, and it is the responsibility of the facility and its nursing staff to carry out the instructions of such physicians. It is the responsibility of the patient's physician/designee to obtain the patient's informed consent, when required, for medical or surgical treatment,  special diagnostic or therapeutic procedures, or hospital services rendered for the patient under the special instructions of the physician/designee.           REGISTRATION AUTHORIZATION  Form No. 21940 (Rev. 3/25/2024)    Page 2 of 3                       Immunizations: [facility] shares immunization information with state sponsored health departments to help you and your doctor keep track of your immunization records. By signing, you consent to have this information shared with the health department in your state:                                [place] - LINKS ([place] Immunization Network for Kids Statewide)                                [place] - MIIX ([place] Immunization Information eXchange)                                [place] - ImmPRINT (Immunization Patient Registry with Integrated Technology)     TERM: This authorization is valid for this and subsequent care/treatment I receive at [facility] and will remain valid unless/until revoked in writing by me.     [facility]: As used in this document, [facility] means all [facility] owned and managed facilities, including, but not limited to, all health centers, surgery centers, clinics, urgent care centers, and hospitals.         [facility] complies with applicable Federal civil rights laws and does not discriminate on the basis of race, color, national origin, age, disability, or sex.  ATENCIÓN: si habla español, tiene a sharp disposición servicios gratuitos de asistencia lingüística. Mitali costa [phone].  CHÚ Ý: N?u b?n nói Ti?ng Vi?t, có các d?ch v? h? tr? ngôn ng? mi?n phí dành cho b?n. G?i s? [phone].        REGISTRATION AUTHORIZATION  Form No. 66385 (Rev. 3/25/2024)   Page 3 of 3

## 2024-07-25 NOTE — PROGRESS NOTES
Health Maintenance Topic(s) Outreach Outcomes & Actions Taken:    Eye Exam - Outreach Outcomes & Actions Taken  : External Records Requested & Care Team Updated if Applicable and MATT sent to Dr. Mosqueda       Additional Notes:

## 2024-07-29 ENCOUNTER — OFFICE VISIT (OUTPATIENT)
Dept: INTERNAL MEDICINE | Facility: CLINIC | Age: 69
End: 2024-07-29
Payer: MEDICARE

## 2024-07-29 VITALS
DIASTOLIC BLOOD PRESSURE: 60 MMHG | HEART RATE: 79 BPM | WEIGHT: 112 LBS | SYSTOLIC BLOOD PRESSURE: 122 MMHG | BODY MASS INDEX: 19.84 KG/M2 | HEIGHT: 63 IN | OXYGEN SATURATION: 96 %

## 2024-07-29 DIAGNOSIS — Z00.00 ENCOUNTER FOR PREVENTIVE HEALTH EXAMINATION: ICD-10-CM

## 2024-07-29 DIAGNOSIS — Z00.00 MEDICARE ANNUAL WELLNESS VISIT, SUBSEQUENT: ICD-10-CM

## 2024-07-29 DIAGNOSIS — Z23 NEED FOR SHINGLES VACCINE: Primary | ICD-10-CM

## 2024-07-29 DIAGNOSIS — R60.0 BILATERAL LEG EDEMA: ICD-10-CM

## 2024-07-29 DIAGNOSIS — D05.11 DUCTAL CARCINOMA IN SITU (DCIS) OF RIGHT BREAST: ICD-10-CM

## 2024-07-29 DIAGNOSIS — E10.3593 TYPE 1 DIABETES MELLITUS WITH PROLIFERATIVE DIABETIC RETINOPATHY WITHOUT MACULAR EDEMA, BILATERAL: ICD-10-CM

## 2024-07-29 PROCEDURE — 4010F ACE/ARB THERAPY RXD/TAKEN: CPT | Mod: CPTII,,, | Performed by: INTERNAL MEDICINE

## 2024-07-29 PROCEDURE — 3044F HG A1C LEVEL LT 7.0%: CPT | Mod: CPTII,,, | Performed by: INTERNAL MEDICINE

## 2024-07-29 PROCEDURE — 3288F FALL RISK ASSESSMENT DOCD: CPT | Mod: CPTII,,, | Performed by: INTERNAL MEDICINE

## 2024-07-29 PROCEDURE — 3078F DIAST BP <80 MM HG: CPT | Mod: CPTII,,, | Performed by: INTERNAL MEDICINE

## 2024-07-29 PROCEDURE — 99213 OFFICE O/P EST LOW 20 MIN: CPT | Mod: 25,,, | Performed by: INTERNAL MEDICINE

## 2024-07-29 PROCEDURE — G0439 PPPS, SUBSEQ VISIT: HCPCS | Mod: ,,, | Performed by: INTERNAL MEDICINE

## 2024-07-29 PROCEDURE — 1126F AMNT PAIN NOTED NONE PRSNT: CPT | Mod: CPTII,,, | Performed by: INTERNAL MEDICINE

## 2024-07-29 PROCEDURE — 3008F BODY MASS INDEX DOCD: CPT | Mod: CPTII,,, | Performed by: INTERNAL MEDICINE

## 2024-07-29 PROCEDURE — 1159F MED LIST DOCD IN RCRD: CPT | Mod: CPTII,,, | Performed by: INTERNAL MEDICINE

## 2024-07-29 PROCEDURE — 3074F SYST BP LT 130 MM HG: CPT | Mod: CPTII,,, | Performed by: INTERNAL MEDICINE

## 2024-07-29 PROCEDURE — 1101F PT FALLS ASSESS-DOCD LE1/YR: CPT | Mod: CPTII,,, | Performed by: INTERNAL MEDICINE

## 2024-07-29 RX ORDER — FUROSEMIDE 20 MG/1
20 TABLET ORAL DAILY
Qty: 7 TABLET | Refills: 0 | Status: SHIPPED | OUTPATIENT
Start: 2024-07-29 | End: 2024-08-05

## 2024-07-29 NOTE — LETTER
AUTHORIZATION FOR RELEASE OF   CONFIDENTIAL INFORMATION    Dear Dr Beltrán    We are seeing Daisy Calderon, date of birth 1955, in the clinic at [place] [address] [facility]. Yana Pearce MD is the patient's PCP. Daisy Calderon has an outstanding lab/procedure at the time we reviewed her chart. In order to help keep her health information updated, she has authorized us to request the following medical record(s):        (  )  MAMMOGRAM                                      (  )  COLONOSCOPY      (  )  PAP SMEAR                                          (X)  OUTSIDE LAB RESULTS     (  )  DEXA SCAN                                          (  )  EYE EXAM            (  )  FOOT EXAM                                          (  )  ENTIRE RECORD     (  )  OUTSIDE IMMUNIZATIONS                 (  )  _______________         Please fax records to [facility], Bhanushali, Reshma A, MD, [phone]        Patient Name: Daisy Calderon  : 1955  Patient Phone #: [phone]

## 2024-07-29 NOTE — ASSESSMENT & PLAN NOTE
-pitting in nature, offered venous/arterial ultrasound however patient wants to hold off   -also cardiology referral will be made in the future once patient is done with her mastectomy and postop care   -in the meantime Lasix 20 mg p.o. daily for 7 days to help   -if possible, compression stockings   -keep bilateral lower extremities elevated, avoid excessive sodium intake

## 2024-07-29 NOTE — ASSESSMENT & PLAN NOTE
-scheduled for a right simple mastectomy with right axillary lymph node excision  -reviewed preop instructions with the patient and her daughter in detail and all of the questions have been addressed and answered

## 2024-07-29 NOTE — ASSESSMENT & PLAN NOTE
-labs are completed with endocrinology, request records   -patient is advised on importance of watching her carbohydrate intake and saturated fat intake, making the right nutritional choices and exercising on a regular basis  -up-to-date with the screening

## 2024-07-29 NOTE — PROGRESS NOTES
Internal Medicine    Daisy Calderon is a 69 y.o. female here today for a Medicare Annual Wellness visit and comprehensive Health Risk Assessment.     Subjective   Ms. Villa is a 69-year-old female with type 1 diabetes mellitus and hypothyroidism that is here today for a Medicare wellness visit.    Patient recently got labs done with endocrinology and records will be requested.    Between last visit and now patient is now diagnosed with DCIS and is scheduled for right mastectomy with right axillary lymph node excision at the time of the mastectomy with further recommendations to follow after.      A separate E/M visit was performed for bilateral lower extremity swelling.    After a fall about 4 months ago, patient has been having swelling of the right lower extremity which sustained an ankle fracture.  Over time she also kept hurting her left leg and now seems to have some swelling in the left ankle as well.  This probably seems to be just a process of recovery however considering her age, I did offer venous and arterial ultrasounds.  She wants to hold off for now.  We discussed cardiology referral as well which she wants to hold off until she is done with her mastectomy etc..    In the meantime I will order Lasix for 7 days at a low dose and patient is emphasized on importance of keeping her bilateral lower extremities elevated when possible.  She is somewhat worried about her brittle diabetes and surgery etc., reassured that the staff will be able to take care of her needs during her hospitalization.      The following components were reviewed and updated:  Medical history  Family History  Social history  Allergies  Current Medications  Immunizations  Health Maintenance  Patient Care Team    Review of Systems  A comprehensive review of systems was conducted and is negative except as noted above.     Objective   Visit Vitals  /60 (BP Location: Left arm, Patient Position: Sitting, BP Method: Small  "(Manual))   Pulse 79   Ht 5' 3" (1.6 m)   Wt 50.8 kg (112 lb)   SpO2 96%   BMI 19.84 kg/m²        Physical Exam  Constitutional:       Appearance: Normal appearance.   HENT:      Head: Normocephalic and atraumatic.      Nose: Nose normal.      Mouth/Throat:      Mouth: Mucous membranes are moist.      Pharynx: Oropharynx is clear.   Eyes:      Extraocular Movements: Extraocular movements intact.      Pupils: Pupils are equal, round, and reactive to light.   Cardiovascular:      Rate and Rhythm: Normal rate and regular rhythm.      Pulses: Normal pulses.   Pulmonary:      Effort: Pulmonary effort is normal.      Breath sounds: Normal breath sounds.   Abdominal:      General: Bowel sounds are normal.      Palpations: Abdomen is soft.   Musculoskeletal:         General: Normal range of motion.      Cervical back: Normal range of motion and neck supple.      Right lower leg: Edema (pitting) present.      Left lower leg: Edema (Pitting) present.   Skin:     General: Skin is warm.   Neurological:      General: No focal deficit present.      Mental Status: She is alert and oriented to person, place, and time. Mental status is at baseline.   Psychiatric:         Mood and Affect: Mood normal.          Assessment/Plan:  1. Need for shingles vaccine  -     varicella-zoster [id number], PF, (SHINGRIX) 50 mcg/0.5 mL injection; Inject 0.5 mLs into the muscle once. for 1 dose  Dispense: 1 each; Refill: 0    2. Encounter for preventive health examination    3. Ductal carcinoma in situ (DCIS) of right breast  Assessment & Plan:  -scheduled for a right simple mastectomy with right axillary lymph node excision  -reviewed preop instructions with the patient and her daughter in detail and all of the questions have been addressed and answered         4. Bilateral leg edema  Assessment & Plan:  -pitting in nature, offered venous/arterial ultrasound however patient wants to hold off   -also cardiology referral will be made in the future once " patient is done with her mastectomy and postop care   -in the meantime Lasix 20 mg p.o. daily for 7 days to help   -if possible, compression stockings   -keep bilateral lower extremities elevated, avoid excessive sodium intake      5. 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 Medicare annual wellness visit, subsequent  Assessment & Plan:  -labs are completed with endocrinology, request records   -patient is advised on importance of watching her carbohydrate intake and saturated fat intake, making the right nutritional choices and exercising on a regular basis  -up-to-date with the screening      Other orders  -     furosemide (LASIX) 20 MG tablet; Take 1 tablet (20 mg total) by mouth once daily. for 7 days  Dispense: 7 tablet; Refill: 0      A comprehensive HEALTH RISK ASSESSMENT was completed today. Results are summarized below:    There are NO EMOTIONAL/SOCIAL CONCERNS identified on today's screening for Social Isolation, Depression and Anxiety.    There are NO COGNITIVE FUNCTION CONCERNS identified on today's screening.  There are NO FUNCTIONAL OR SAFETY CONCERNS were identified on today's screening for Physical Symptoms, Nutritional, Cognitive Function, Home Safety/Living Situation, Fall Risk, Activities of Daily Living, Independent Activities of Daily Living, Physical Activity, Timed Up and Go test and Whisper test.   The patient reports NO OPIOID PRESCRIPTIONS. This was confirmed through medication reconciliation and the [facility] website.    The patient is NOT A TOBACCO USER.  The patient reports NO SIGNIFICANT ALCOHOL USE.     All Questions regarding food, transportation or housing were not answered today.      I provided Daisy Calderon with a 5-10 year written Screening Schedule per USPSTF age appropriate recommendations and a  Personal Prevention Plan based on the results of today's Health Risk Assessment. Education, counseling, and referrals were provided as documented above and can be viewed in the After Visit Summary.    Follow up in about 6 months (around 1/29/2025). In addition to this scheduled follow up, the patient has also been instructed to follow up on as needed basis.     none  The patient was asked and declined the use of a free .  Advance Care Planning   Today we discussed advance care planning. She is interested in learning more about how to make Advance Directives. Information was provided and I offered to discuss more at her discretion.

## 2024-07-29 NOTE — PATIENT INSTRUCTIONS
Medicare Annual Wellness Visit      Patient Name: Daisy Calderon  Today's Date: 7/29/2024    Below you will find your 5-10 year Screening Plan Recommendations:  Health Maintenance       Date Due Completion Date    Hepatitis C Screening Never done ---    Low Dose Statin Never done ---    Shingles Vaccine (1 of 2) Never done ---    TETANUS VACCINE 10/25/2015 10/25/2005    RSV Vaccine (Age 60+ and Pregnant patients) (1 - 1-dose 60+ series) Never done ---    Diabetes Urine Screening 01/26/2024 1/26/2023    COVID-19 Vaccine (8 - 2023-24 season) 02/20/2024 10/20/2023    Eye Exam 06/15/2024 6/15/2023    Hemoglobin A1c 08/06/2024 2/6/2024    Foot Exam 08/31/2024 8/31/2023    Influenza Vaccine (1) 09/01/2024 11/3/2023    Mammogram 12/20/2024 12/20/2023    Lipid Panel 02/06/2025 2/6/2024    DEXA Scan 07/27/2025 7/27/2022    Colorectal Cancer Screening 02/06/2026 2/6/2023          Below is your summarized Personalized Prevention Plan that addresses any concerns we discussed today at your visit. Please see attached detailed information specific to your Health Concerns.  No orders of the defined types were placed in this encounter.        The following information is provided to all patients.  This information is to help you find additional resources for any problems that may be affecting your health: Living healthy guide: www.[facility].[place].gov      Understanding Diabetes: www.diabetes.org      Eating healthy: www.cdc.gov/healthyweight      CDC home safety checklist: www.cdc.gov/steadi/patient.html      Agency on Aging: www.goea.[place].gov      Alcoholics anonymous (AA): www.aa.org      Physical Activity: www.alondra.nih.gov/kk2zrdq      Tobacco use: www.quitwithusla.org

## 2024-07-29 NOTE — ASSESSMENT & PLAN NOTE
-doing well, no acute issues   -continue with insulin pump, followed by Endocrinology closely

## 2024-08-05 ENCOUNTER — HOSPITAL ENCOUNTER (OUTPATIENT)
Dept: RADIOLOGY | Facility: HOSPITAL | Age: 69
Discharge: HOME | End: 2024-08-05
Attending: SURGERY | Admitting: SURGERY
Payer: MEDICARE

## 2024-08-05 ENCOUNTER — HOSPITAL ENCOUNTER (OUTPATIENT)
Facility: HOSPITAL | Age: 69
Discharge: HOME | End: 2024-08-05
Attending: SURGERY | Admitting: SURGERY
Payer: MEDICARE

## 2024-08-05 ENCOUNTER — ANESTHESIA EVENT (OUTPATIENT)
Dept: SURGERY | Facility: HOSPITAL | Age: 69
End: 2024-08-05
Payer: MEDICARE

## 2024-08-05 ENCOUNTER — ANESTHESIA (OUTPATIENT)
Dept: SURGERY | Facility: HOSPITAL | Age: 69
End: 2024-08-05
Payer: MEDICARE

## 2024-08-05 DIAGNOSIS — D05.11 DUCTAL CARCINOMA IN SITU (DCIS) OF RIGHT BREAST: ICD-10-CM

## 2024-08-05 DIAGNOSIS — R92.8 ABNORMAL MAMMOGRAM: ICD-10-CM

## 2024-08-05 LAB
GRAM STN SPEC: NORMAL
GRAM STN SPEC: NORMAL
KOH PREP SPEC: NORMAL
POCT GLUCOSE: 107 MG/DL (ref 70–110)
POCT GLUCOSE: 123 MG/DL (ref 70–110)
POCT GLUCOSE: 163 MG/DL (ref 70–110)

## 2024-08-05 PROCEDURE — 37000009 HC ANESTHESIA EA ADD 15 MINS: Performed by: SURGERY

## 2024-08-05 PROCEDURE — 76098 X-RAY EXAM SURGICAL SPECIMEN: CPT | Mod: TC

## 2024-08-05 PROCEDURE — 71000039 HC RECOVERY, EACH ADD'L HOUR: Performed by: SURGERY

## 2024-08-05 PROCEDURE — 88342 IMHCHEM/IMCYTCHM 1ST ANTB: CPT

## 2024-08-05 PROCEDURE — 27201423 OPTIME MED/SURG SUP & DEVICES STERILE SUPPLY: Performed by: SURGERY

## 2024-08-05 PROCEDURE — 63600175 PHARM REV CODE 636 W HCPCS: Mod: JZ,JG | Performed by: SURGERY

## 2024-08-05 PROCEDURE — 88307 TISSUE EXAM BY PATHOLOGIST: CPT | Performed by: SURGERY

## 2024-08-05 PROCEDURE — 63600175 PHARM REV CODE 636 W HCPCS: Performed by: SURGERY

## 2024-08-05 PROCEDURE — 38792 RA TRACER ID OF SENTINL NODE: CPT | Mod: XE,RT,, | Performed by: SURGERY

## 2024-08-05 PROCEDURE — 71000015 HC POSTOP RECOV 1ST HR: Performed by: SURGERY

## 2024-08-05 PROCEDURE — D9220A PRA ANESTHESIA: Mod: ANES,,, | Performed by: ANESTHESIOLOGY

## 2024-08-05 PROCEDURE — 87205 SMEAR GRAM STAIN: CPT | Performed by: SURGERY

## 2024-08-05 PROCEDURE — 38500 BIOPSY/REMOVAL LYMPH NODES: CPT | Mod: 51,RT,, | Performed by: SURGERY

## 2024-08-05 PROCEDURE — 19303 MAST SIMPLE COMPLETE: CPT | Mod: AS,RT,, | Performed by: PHYSICIAN ASSISTANT

## 2024-08-05 PROCEDURE — 37000008 HC ANESTHESIA 1ST 15 MINUTES: Performed by: SURGERY

## 2024-08-05 PROCEDURE — 36000706: Performed by: SURGERY

## 2024-08-05 PROCEDURE — 19303 MAST SIMPLE COMPLETE: CPT | Mod: RT,,, | Performed by: SURGERY

## 2024-08-05 PROCEDURE — 88341 IMHCHEM/IMCYTCHM EA ADD ANTB: CPT

## 2024-08-05 PROCEDURE — 71000033 HC RECOVERY, INTIAL HOUR: Performed by: SURGERY

## 2024-08-05 PROCEDURE — 87075 CULTR BACTERIA EXCEPT BLOOD: CPT | Performed by: SURGERY

## 2024-08-05 PROCEDURE — 63600175 PHARM REV CODE 636 W HCPCS: Performed by: ANESTHESIOLOGY

## 2024-08-05 PROCEDURE — 25000003 PHARM REV CODE 250: Performed by: PHYSICIAN ASSISTANT

## 2024-08-05 PROCEDURE — 36000707: Performed by: SURGERY

## 2024-08-05 PROCEDURE — 82962 GLUCOSE BLOOD TEST: CPT | Performed by: SURGERY

## 2024-08-05 PROCEDURE — 25000003 PHARM REV CODE 250: Performed by: NURSE ANESTHETIST, CERTIFIED REGISTERED

## 2024-08-05 PROCEDURE — C1729 CATH, DRAINAGE: HCPCS | Performed by: SURGERY

## 2024-08-05 PROCEDURE — 87220 TISSUE EXAM FOR FUNGI: CPT | Performed by: SURGERY

## 2024-08-05 PROCEDURE — 71000016 HC POSTOP RECOV ADDL HR: Performed by: SURGERY

## 2024-08-05 PROCEDURE — 87070 CULTURE OTHR SPECIMN AEROBIC: CPT | Performed by: SURGERY

## 2024-08-05 PROCEDURE — 63600175 PHARM REV CODE 636 W HCPCS: Performed by: NURSE ANESTHETIST, CERTIFIED REGISTERED

## 2024-08-05 PROCEDURE — D9220A PRA ANESTHESIA: Mod: CRNA,,, | Performed by: NURSE ANESTHETIST, CERTIFIED REGISTERED

## 2024-08-05 PROCEDURE — 87206 SMEAR FLUORESCENT/ACID STAI: CPT | Performed by: SURGERY

## 2024-08-05 RX ORDER — MIDAZOLAM HYDROCHLORIDE 1 MG/ML
INJECTION INTRAMUSCULAR; INTRAVENOUS
Status: DISCONTINUED | OUTPATIENT
Start: 2024-08-05 | End: 2024-08-05

## 2024-08-05 RX ORDER — ONDANSETRON HYDROCHLORIDE 2 MG/ML
4 INJECTION, SOLUTION INTRAVENOUS DAILY PRN
Status: DISCONTINUED | OUTPATIENT
Start: 2024-08-05 | End: 2024-08-05

## 2024-08-05 RX ORDER — TRAMADOL HYDROCHLORIDE 50 MG/1
50 TABLET ORAL EVERY 6 HOURS PRN
Qty: 28 TABLET | Refills: 0 | Status: SHIPPED | OUTPATIENT
Start: 2024-08-05 | End: 2024-08-13

## 2024-08-05 RX ORDER — SODIUM CHLORIDE, SODIUM GLUCONATE, SODIUM ACETATE, POTASSIUM CHLORIDE AND MAGNESIUM CHLORIDE 30; 37; 368; 526; 502 MG/100ML; MG/100ML; MG/100ML; MG/100ML; MG/100ML
INJECTION, SOLUTION INTRAVENOUS CONTINUOUS
Status: DISCONTINUED | OUTPATIENT
Start: 2024-08-05 | End: 2024-08-05

## 2024-08-05 RX ORDER — MIDAZOLAM HYDROCHLORIDE 2 MG/2ML
INJECTION, SOLUTION INTRAMUSCULAR; INTRAVENOUS
Status: DISCONTINUED
Start: 2024-08-05 | End: 2024-08-05 | Stop reason: WASHOUT

## 2024-08-05 RX ORDER — LIDOCAINE HYDROCHLORIDE 10 MG/ML
INJECTION, SOLUTION EPIDURAL; INFILTRATION; INTRACAUDAL; PERINEURAL
Status: DISCONTINUED | OUTPATIENT
Start: 2024-08-05 | End: 2024-08-05

## 2024-08-05 RX ORDER — SODIUM CHLORIDE, SODIUM LACTATE, POTASSIUM CHLORIDE, CALCIUM CHLORIDE 600; 310; 30; 20 MG/100ML; MG/100ML; MG/100ML; MG/100ML
INJECTION, SOLUTION INTRAVENOUS CONTINUOUS
Status: DISCONTINUED | OUTPATIENT
Start: 2024-08-05 | End: 2024-08-05

## 2024-08-05 RX ORDER — GLUCAGON 1 MG
1 KIT INJECTION
Status: DISCONTINUED | OUTPATIENT
Start: 2024-08-05 | End: 2024-08-05

## 2024-08-05 RX ORDER — METHOCARBAMOL 100 MG/ML
1000 INJECTION, SOLUTION INTRAMUSCULAR; INTRAVENOUS ONCE AS NEEDED
Status: COMPLETED | OUTPATIENT
Start: 2024-08-05 | End: 2024-08-05

## 2024-08-05 RX ORDER — PROPOFOL 10 MG/ML
VIAL (ML) INTRAVENOUS
Status: DISCONTINUED | OUTPATIENT
Start: 2024-08-05 | End: 2024-08-05

## 2024-08-05 RX ORDER — BUPIVACAINE HYDROCHLORIDE 5 MG/ML
INJECTION, SOLUTION EPIDURAL; INTRACAUDAL
Status: DISCONTINUED
Start: 2024-08-05 | End: 2024-08-05 | Stop reason: HOSPADM

## 2024-08-05 RX ORDER — CLINDAMYCIN PHOSPHATE 900 MG/50ML
900 INJECTION, SOLUTION INTRAVENOUS
Status: COMPLETED | OUTPATIENT
Start: 2024-08-05 | End: 2024-08-05

## 2024-08-05 RX ORDER — ISOSULFAN BLUE 50 MG/5ML
INJECTION, SOLUTION SUBCUTANEOUS
Status: DISCONTINUED
Start: 2024-08-05 | End: 2024-08-05 | Stop reason: HOSPADM

## 2024-08-05 RX ORDER — LIDOCAINE HYDROCHLORIDE 10 MG/ML
1 INJECTION, SOLUTION EPIDURAL; INFILTRATION; INTRACAUDAL; PERINEURAL ONCE
Status: DISCONTINUED | OUTPATIENT
Start: 2024-08-05 | End: 2024-08-05

## 2024-08-05 RX ORDER — FENTANYL CITRATE 50 UG/ML
INJECTION, SOLUTION INTRAMUSCULAR; INTRAVENOUS
Status: DISCONTINUED | OUTPATIENT
Start: 2024-08-05 | End: 2024-08-05

## 2024-08-05 RX ORDER — ACETAMINOPHEN 10 MG/ML
1000 INJECTION, SOLUTION INTRAVENOUS ONCE
Status: DISCONTINUED | OUTPATIENT
Start: 2024-08-05 | End: 2024-08-05

## 2024-08-05 RX ORDER — BUPIVACAINE HYDROCHLORIDE 5 MG/ML
INJECTION, SOLUTION EPIDURAL; INTRACAUDAL
Status: DISCONTINUED | OUTPATIENT
Start: 2024-08-05 | End: 2024-08-05 | Stop reason: HOSPADM

## 2024-08-05 RX ORDER — CEFAZOLIN SODIUM 1 G/3ML
INJECTION, POWDER, FOR SOLUTION INTRAMUSCULAR; INTRAVENOUS
Status: DISCONTINUED
Start: 2024-08-05 | End: 2024-08-05 | Stop reason: HOSPADM

## 2024-08-05 RX ORDER — SODIUM CHLORIDE, SODIUM LACTATE, POTASSIUM CHLORIDE, CALCIUM CHLORIDE 600; 310; 30; 20 MG/100ML; MG/100ML; MG/100ML; MG/100ML
INJECTION, SOLUTION INTRAVENOUS CONTINUOUS
Status: DISCONTINUED | OUTPATIENT
Start: 2024-08-05 | End: 2024-08-05 | Stop reason: HOSPADM

## 2024-08-05 RX ORDER — DIPHENHYDRAMINE HYDROCHLORIDE 50 MG/ML
25 INJECTION INTRAMUSCULAR; INTRAVENOUS EVERY 6 HOURS PRN
Status: DISCONTINUED | OUTPATIENT
Start: 2024-08-05 | End: 2024-08-05

## 2024-08-05 RX ORDER — ROCURONIUM BROMIDE 10 MG/ML
INJECTION, SOLUTION INTRAVENOUS
Status: DISCONTINUED | OUTPATIENT
Start: 2024-08-05 | End: 2024-08-05

## 2024-08-05 RX ORDER — ISOSULFAN BLUE 50 MG/5ML
INJECTION, SOLUTION SUBCUTANEOUS
Status: DISCONTINUED | OUTPATIENT
Start: 2024-08-05 | End: 2024-08-05 | Stop reason: HOSPADM

## 2024-08-05 RX ORDER — ONDANSETRON HYDROCHLORIDE 2 MG/ML
INJECTION, SOLUTION INTRAMUSCULAR; INTRAVENOUS
Status: DISCONTINUED | OUTPATIENT
Start: 2024-08-05 | End: 2024-08-05

## 2024-08-05 RX ORDER — TRAMADOL HYDROCHLORIDE 50 MG/1
50 TABLET ORAL EVERY 4 HOURS PRN
Status: DISCONTINUED | OUTPATIENT
Start: 2024-08-05 | End: 2024-08-05 | Stop reason: HOSPADM

## 2024-08-05 RX ADMIN — SODIUM CHLORIDE, POTASSIUM CHLORIDE, SODIUM LACTATE AND CALCIUM CHLORIDE: 600; 310; 30; 20 INJECTION, SOLUTION INTRAVENOUS at 12:08

## 2024-08-05 RX ADMIN — LIDOCAINE HYDROCHLORIDE 20 MG: 10 INJECTION, SOLUTION EPIDURAL; INFILTRATION; INTRACAUDAL; PERINEURAL at 10:08

## 2024-08-05 RX ADMIN — CLINDAMYCIN PHOSPHATE 900 MG: 900 INJECTION, SOLUTION INTRAVENOUS at 10:08

## 2024-08-05 RX ADMIN — ONDANSETRON 4 MG: 2 INJECTION INTRAMUSCULAR; INTRAVENOUS at 11:08

## 2024-08-05 RX ADMIN — METHOCARBAMOL 1000 MG: 100 INJECTION INTRAMUSCULAR; INTRAVENOUS at 12:08

## 2024-08-05 RX ADMIN — MIDAZOLAM 2 MG: 1 INJECTION INTRAMUSCULAR; INTRAVENOUS at 10:08

## 2024-08-05 RX ADMIN — FENTANYL CITRATE 50 MCG: 50 INJECTION, SOLUTION INTRAMUSCULAR; INTRAVENOUS at 10:08

## 2024-08-05 RX ADMIN — ROCURONIUM BROMIDE 50 MG: 10 INJECTION, SOLUTION INTRAVENOUS at 10:08

## 2024-08-05 RX ADMIN — PROPOFOL 140 MG: 10 INJECTION, EMULSION INTRAVENOUS at 10:08

## 2024-08-05 RX ADMIN — TRAMADOL HYDROCHLORIDE 50 MG: 50 TABLET, COATED ORAL at 01:08

## 2024-08-05 RX ADMIN — SODIUM CHLORIDE, POTASSIUM CHLORIDE, SODIUM LACTATE AND CALCIUM CHLORIDE: 600; 310; 30; 20 INJECTION, SOLUTION INTRAVENOUS at 10:08

## 2024-08-05 RX ADMIN — SUGAMMADEX 200 MG: 100 INJECTION, SOLUTION INTRAVENOUS at 11:08

## 2024-08-05 NOTE — OP NOTE
DATE OF PROCEDURE: 8/5/2024    SURGEON: Cindy Cohen M.D.    ASSISTANT:  Mariah Alexander PA-C    PREOPERATIVE DIAGNOSIS: Ductal carcinoma in situ (DCIS) of right breast [D05.11]     POSTOPERATIVE DIAGNOSIS: Post-Op Diagnosis Codes:     * Ductal carcinoma in situ (DCIS) of right breast [D05.11]     ANESTHESIA: General Anesthesiologist: Rodney Holliday MD  CRNA: Paige Kumari CRNA     PROCEDURES PERFORMED:   1. Right breast simple mastectomy  2. Injection of right breast with Superparamagnetic iron oxide (MagTrace) intra-operatively for possible sentinel lymph node identification   3. Excision of right axillary lymph node    PROCEDURE IN DETAIL:   Daisy Calderon is a 69 y.o. female brought to the operating room for definitive surgical management of right breast cancer. The patient has elected to undergo right simple mastectomy with MagTrace injection intra-operatively for possible sentinel lymph node biopsy for pippa assessment pending final pathology. The patient was informed of the possible risks and complications of the procedure, including but not limited to anesthetic risks, bleeding, infection, and need for additional surgery. The patient concurred with the proposed plan, and has given informed consent. The site of surgery was properly noted/marked in the preoperative holding area.    The patient's right breast was injected with Superparamagnetic iron oxide (MagTrace)  intra-operatively  to facilitate possible sentinel lymph node identification. The patient was then brought to the operating room and placed in the supine position with both upper extremities extended.  General anesthesia was administered. Perioperative antibiotics were administered and a time out was performed confirming the patient, site, and procedure. The right chest and axilla was then prepped and draped in the usual sterile fashion.    The Right Mastectomy  The right simple mastectomy was performed first. A large elliptical  skin incision was made to right breast tissue that included the nipple-areolar complex. Flaps were raised in the avascular plane between subcutaneous tissue and breast tissue was used for to raise flaps from the clavicle superiorly, the sternum medially, the anterior rectus sheath inferiorly, and the lateral border of the pectoralis major muscle laterally. Hemostasis was maintained in the flaps. Next, the breast tissue and underlying pectoralis fascia were excised from the pectoralis major muscle, progressing from medial to lateral. At the lateral border of the pectoralis major muscle, the breast tissue was sung laterally and a lateral pedicle identified where breast tissue gave way to fat of axilla. The lateral pedicle was incised, removed and the specimen was marked. Superiorly was a short suture and laterally was a long suture. The resulting mastectomy specimen was marked using a short stitch superiorly and a long stitch laterally. The breast was sent to pathology for permanent evaluation. The operative field was irrigated with normal saline and all bleeding points were secured with Bovie electrocautery.     Right Axillary Lymph Node Excision was performed. The fascia overlying the  axillary cavity was incised to allow access. There was a palpable lymph node, which appears soft on palpation. This lymph nodes was excised for purpose of Lymphoma protocol, cytology, infection work, and pathology evaluation. The lymph nodes was sent to pathology fresh.    A sentinel lymph node biopsy was not performed given the diagnosis of ductal carcinoma in situ.    The cavity was irrigated and hemostasis was obtained. Via a remote separate incision, a 15-Nepali round Edson-Parisi (JORDAN) was placed and secured with a 3-0 Nylon robi sandal suture.    The subdermal layer was closed with 3-0 Monocryl and 4-0 subcuticular running skin closures and dermabond was applied followed by sterile dressings.    Significant Surgical Tasks  Conducted by the Assistant(s), if Applicable: The skilled assistance of the Physician Assistant, Mariah Alexander PA-C, was necessary for the successful completion of this case. She was essential for proper positioning of the patient, manipulation of instruments, proper exposure, manipulation of tissue, and wound closure.       ESTIMATED BLOOD LOSS: 10 ml    Implants: * No implants in log *    Specimens:   Specimen (24h ago, onward)       Start     Ordered    08/05/24 1139  Specimen to Pathology  RELEASE UPON ORDERING        References:    Click here for ordering Quick Tip   Question:  Release to patient  Answer:  Immediate    08/05/24 1139    08/05/24 1138  Cytology- [facility] Radiology Guided, Bronch/EBUS, EUS/GI  RELEASE UPON ORDERING        Question:  Release to patient  Answer:  Immediate    08/05/24 1138                   ID Type Source Tests Collected by Time Destination   A : RIGHT SIMPLE MASTECTOMY (STITCH: SHORT SUPERIOR, LONG LATERAL) Tissue Breast, Right SPECIMEN TO PATHOLOGY Cindy Cohen MD 8/5/2024 1127    B : RIGHT EXCISIONAL NODE BIOPSY (LYMPHOMA R/O) Tissue Lymph Node CYTOLOGY SPECIMEN- [facility] RADIOLOGY GUIDED, BRONCH/EBUS, EUS/GI, FLOW CYTOMETRY PANEL ([facility]), AFB SMEAR [facility], ANAEROBIC CULTURE [facility], FUNGAL CULTURE [facility], GRAM STAIN [facility], KOH PREP [facility], TISSUE CULTURE - AEROBIC [facility] Cindy Cohen MD 8/5/2024 1127                 Condition: Good    Disposition: PACU - hemodynamically stable.    Attestation: I performed the procedure.

## 2024-08-05 NOTE — BRIEF OP NOTE
[facility]  Brief Operative Note     SUMMARY     Surgery Date: 8/5/2024     Surgeon: Cindy Cohen MD    Assist: Mariah Alexander PA-C    Pre-op Diagnosis:  Ductal carcinoma in situ (DCIS) of right breast [D05.11]    Post-op Diagnosis:  Post-Op Diagnosis Codes:     * Ductal carcinoma in situ (DCIS) of right breast [D05.11]    Procedure(s) (LRB):  MASTECTOMY, SIMPLE  ///right, MagTrace to be Injected Intra-operatively (Right)  EXCISION, LYMPH NODE  ///right, Send fresh for flow cytometry, Send for Pathology (Right)    Anesthesia: General    Findings/Key Components: Removal of the right breast with biopsy-proven malignancy and right axillary lymph node excision for pathology. MagTrace injected for possible sentinel lymph node biopsy.    Estimated Blood Loss: 10 ml         Specimens:   Specimen (24h ago, onward)       Start     Ordered    08/05/24 1139  Specimen to Pathology  RELEASE UPON ORDERING        References:    Click here for ordering Quick Tip   Question:  Release to patient  Answer:  Immediate    08/05/24 1139    08/05/24 1138  Cytology- [facility] Radiology Guided, Bronch/EBUS, EUS/GI  RELEASE UPON ORDERING        Question:  Release to patient  Answer:  Immediate    08/05/24 1138                  ID Type Source Tests Collected by Time Destination   A : RIGHT SIMPLE MASTECTOMY (STITCH: SHORT SUPERIOR, LONG LATERAL) Tissue Breast, Right SPECIMEN TO PATHOLOGY Cindy Cohen MD 8/5/2024 1127    B : RIGHT EXCISIONAL NODE BIOPSY (LYMPHOMA R/O) Tissue Lymph Node CYTOLOGY SPECIMEN- [facility] RADIOLOGY GUIDED, BRONCH/EBUS, EUS/GI, FLOW CYTOMETRY PANEL ([facility]), AFB SMEAR [facility], ANAEROBIC CULTURE [facility], FUNGAL CULTURE [facility], GRAM STAIN [facility], KOH PREP [facility], TISSUE CULTURE - AEROBIC [facility] Cindy Cohen MD 8/5/2024 1127        Discharge Note    SUMMARY     Admit Date: 8/5/2024    Discharge Date and Time:  08/05/2024 12:21 PM    Hospital Course (synopsis of major diagnoses, care, treatment, and services  provided during the course of the hospital stay): status post right simple mastectomy with right axillary lymph node excision     Final Diagnosis: Post-Op Diagnosis Codes:     * Ductal carcinoma in situ (DCIS) of right breast [D05.11]    Disposition: Home or Self Care    Follow Up/Patient Instructions:    Follow-up Information       Cindy Cohen MD Follow up.    Specialties: Breast Surgery, General Surgery  Why: 8/13/2024 2:40 PM  Contact information:  [address] [place] [id number]  [phone]                             Medications:  Reconciled Home Medications:      Medication List        START taking these medications      traMADoL 50 mg tablet  Commonly known as: ULTRAM  Take 1 tablet (50 mg total) by mouth every 6 (six) hours as needed for Pain.            CONTINUE taking these medications      furosemide 20 MG tablet  Commonly known as: LASIX  Take 1 tablet (20 mg total) by mouth once daily. for 7 days     insulin aspart U-100 100 unit/mL injection  Commonly known as: NovoLOG  Inject 67 Units into the skin Daily. Average taken 34 units daily     latanoprost 0.005 % ophthalmic solution  Apply 1 drop to eye every evening.     levothyroxine 125 MCG tablet  Commonly known as: SYNTHROID  Take 125 mcg by mouth before breakfast.     lisinopriL 10 MG tablet  Take 10 mg by mouth once daily.     ondansetron 4 MG tablet  Commonly known as: ZOFRAN  Take 1 tablet (4 mg total) by mouth every 6 (six) hours as needed for Nausea.     zoledronic acid-mannitol & water 5 mg/100 mL Pgbk  Commonly known as: RECLAST  Inject 5 mg into the vein once.            Discharge Procedure Orders   Diet general     Lifting restrictions     Remove dressing in 48 hours     Call MD for:  temperature >100.4     Call MD for:  persistent nausea and vomiting     Call MD for:  severe uncontrolled pain     Call MD for:  difficulty breathing, headache or visual disturbances     Call MD for:  redness, tenderness, or signs of  infection (pain, swelling, redness, odor or green/yellow discharge around incision site)     Call MD for:  hives     Call MD for:  persistent dizziness or light-headedness      Follow-up Information       Cindy Cohen MD Follow up.    Specialties: Breast Surgery, General Surgery  Why: 8/13/2024 2:40 PM  Contact information:  [address] [place] [id number]  [phone]

## 2024-08-05 NOTE — PLAN OF CARE
Pt. Resting comfortably, VSS, no s/s distress, Parth at acceptable level for transfer to phase II, Criteria met for anesthesia release per Dr. Rodney Holliday.

## 2024-08-05 NOTE — DISCHARGE INSTRUCTIONS
BREAST SURGERY POST-OP INSTRUCTIONS  DR. BIJU VALLECILLO PA-C     How do I care for my incisions?  You and your caregiver should look at your incision daily. Call your doctor if you see any redness or drainage from your incision.  You will be given a support bra/wrap, wear this for 24 hours a day (unless showering or sponge bathing) for comfort and to help decrease amount of swelling. If the bra fits too loose or too tight you may go to your local store a purchase a front opening sports bra that fits snug.   Your incision will be closed with sutures (stitches) under your skin. These sutures dissolve on their own, so they do not need to be removed.  · If you go home with Steri-StripsTM on your incision, they will loosen and fall off by themselves. If they havent fallen off within 14 days, you may remove them.  · If you go home with glue over your sutures (stitches), it will also loosen and peel off, similarly to the Steri-Strips.  ** If you have had a Mastectomy and/or Reconstruction and/or Axillary Lymph Node Dissection:  You may have 1-2 Edson-Parisi Drains in place. Please refer to the additional instructions discussing care of your drains.     Is it normal to feel new sensations?  As you are healing, you may feel a several different sensations in your breast. Tenderness, numbness, and twinges are common examples. These sensations usually come and go, and will lessen over time, usually within the first few months after surgery. As you continue to heal, you may feel scar tissue along your incision site. It will feel hard. This is common and will soften over the next several months.     Can I shower?  You can shower 48 hours after your surgery. Taking a warm shower is relaxing and can help decrease discomfort. Use soap when you shower and gently wash your incision. Pat the areas dry with a towel after showering, and leave your incision uncovered, unless you have drainage from your incision. If  you have drainage, call your doctors office.  Do not take tub baths, swim, or use hot tubs or saunas until you discuss it with your doctor at the first appointment after your surgery.    Will I have pain when I am home?  The length of time each person has pain or discomfort varies. You will be given a prescription for pain medication before you go home. Follow the guidelines below to manage your pain.  · Take your medication as directed and as needed.  · Call your doctor if the pain medication prescribed for you doesnt relieve your pain.  · Do not drive or drink alcohol while you are taking prescription pain medication.  · As your incision heals, you will have less pain and need less pain medication. A mild pain reliever such as acetaminophen (Tylenol) or ibuprofen (Advil) will relieve aches and discomfort. However, large quantities of acetaminophen may be harmful to your liver. Do not take more acetaminophen than the amount directed on the bottle or as instructed by your doctor or nurse.  · Pain medication should help you as you resume your normal activities. Pain medication is most effective 30 to 45 minutes after taking it.  · Keep track of when you take your pain medication. Taking it when your pain first begins is more effective than waiting for the pain to get worse.  Pain medication may cause constipation (having fewer bowel movements than what is normal for you).    How can I prevent constipation?  · Go to the bathroom at the same time every day. Your body will get used to going at that time.  · If you feel the urge to go, do not put it off. Try to use the bathroom 5 to 15 minutes after meals.  · After breakfast is a good time to move your bowels because the reflexes in your colon are strongest then.  · Exercise if you can; walking is an excellent form of exercise.  · Drink 8 (8-ounce) glasses (2 liters) of liquids daily, if you can. Drink water, juices, soups, ice cream shakes, and other drinks that do  not have caffeine. Beverages with caffeine, such as coffee and soda, pull fluid out of the body.  · Slowly increase the fiber in your diet to 25 to 35 grams per day. Fruits, vegetables, whole grains, and cereals contain fiber. If you have an ostomy or have had recent bowel surgery, check with your doctor or nurse before making any changes in your diet.  · Both over-the-counter and prescription medications are available to treat constipation. Start with 1 of the following over-the-counter medications first:  o Docusate sodium (Colace®) 100 mg. Take __1___ capsules __1___ time a day. This is a stool softener that causes few side effects. Do not take it with mineral oil.  o Polyethylene glycol (MiraLAX®) 17 grams daily.  o Senna (Senokot®) 2 tablets at bedtime. This is a stimulant laxative, which can cause cramping.  · If you havent had a bowel movement in 2 days, call your doctor or nurse.    Will I be able to eat?  You can resume eating when you go home after surgery. Eating a balanced diet high in protein will help you heal after surgery. Your diet should include a healthy protein source at each meal, as well as fruits, vegetables, and whole grains. If you have questions about your diet, ask to see a dietitian.    When is it safe for me to drive and what activities can I perform?  You may resume driving after surgery as long as you are not taking prescription pain medication that may make you drowsy, and you have your full range of motion.  You should not lift anything heavier than 10-15 lbs the first week. After this time, you may gradually increase the amount of weight. You want to take it easy the first 2 weeks, no strenuous or repetitive movements such as vacuuming or scrubbing. Walking is okay. Ask the doctor if you have questions.    How long until I have the pathology results?  The pathology report usually takes to 7 to 10 business days.    When is my first appointment after my surgery?  You should be given  or schedule a follow-up appointment 1 to 2 weeks after your surgery.    How can I cope with my feelings?   After surgery for a serious illness, you may have new and upsetting feelings. Many patients say they felt sad, worried, nervous, irritable, or angry at one time or another. You may find that you cannot control some of these feelings. If this happens, its a good idea to seek emotional support.  The first step in coping is to talk about how you feel. Family and friends can help. Your nurse, doctor, and  can reassure, support, and guide you. It is always a good idea to let these professionals know how you, your family, and your friends are feeling emotionally. Many resources are available to patients and their families. Whether you are in the hospital or at home, we are here to help you and your family and friends handle the emotional aspects of your illness.    What if I have other questions?  If you have any questions or concerns, please talk with your doctor or nurse. You can reach them Monday through Thursday from 9:00 AM to 5:00 PM and Friday from 9:00 AM to 12:00 PM at [phone].  After 5:00 PM M-Th or 12:00 PM Fri, during the weekend, and on holidays, please call [phone] and ask for the doctor on call.    PLEASE CALL YOUR DOCTOR OR NURSE IF YOU HAVE:  · A temperature of 101° F (38.3° C) or higher  · Shortness of breath  · Warmer than normal skin around your incision  · Increased discomfort in the area  · Increased redness around your incision  · New or increased swelling around your incision  · Discharge from your incision          Patient Education       Edson-Parisi Drain   Why is this procedure done?   In some cases, fluid gathers in the wound area after a surgery. This fluid may raise the chance of infection. It also slows down the healing process. When this might happen, the doctor may put in a drain to bring the fluid outside the body.  A Edson-Parisi, or JORDAN drain, may be used  to help get rid of the extra fluid from around the cut site. A JORDAN drain is made up of two parts. The first part is a thin rubber tube. The other part is a soft bulb with a removable stopper. The bulb acts as suction and a container to collect the fluid from the body.       What will the results be?   This drain gets rid of extra fluid from your cut site. This will help it to get better faster.  What happens before the procedure?   Talk to the doctor about all the drugs you are taking. Be sure to include all prescription and over-the-counter (OTC) drugs, and herbal supplements. Tell the doctor about any drug allergy. Bring a list of drugs you take with you.  Any bleeding problems. Be sure to tell your doctor if you are taking any drugs that may cause bleeding. Some of these are warfarin, rivaroxaban, apixaban, ticagrelor, clopidogrel, ketorolac, ibuprofen, naproxen, or aspirin. Certain vitamins and herbs, such as garlic and fish oil, may also add to the risk for bleeding. You may need to stop these drugs as well. Talk to your doctor about them.  Ask your doctor if you may eat or drink anything before the procedure.  You will not be allowed to drive right away after the procedure. Ask a family member or a friend to drive you home.  What happens during the procedure?   During surgery, your doctor will put one end of the rubber tube into the area where there is extra fluid. The tube will be held in place by a stitch in your skin.  The other end of the rubber tube is hooked to the bulb. The doctor will then remove the stopper. The doctor squeezes the bulb to get rid of the air. The stopper is put back on the bulb and this makes suction inside the drain. The excess fluid will be pulled out of your body.  It may only take a few minutes to put in the drain.  You will not be awake if the drain is put in during surgery.  What happens after the procedure?   You may be sore where the drain was put in. Your doctor will give you  drugs for the pain.  You may need to stay in the hospital until you are well enough to go home. Your doctor will watch to see how much fluid is in the bulb each day.  The nurses will empty the drain when it gets about half full or at certain times during the day and measure the amount of fluid emptied.  What care is needed at home?   Ask your doctor what you need to do when you go home. Make sure you ask questions if you do not understand what the doctor says. This way you will know what you need to do.  You will learn how to:  Wash your hands every time before and after you empty the drain or change your bandage.  Care for the skin around the site where the tube goes into your body.  Empty the drain. Remove the stopper at the end. Pour out the drainage. Your doctor may want you to measure how much drainage is in the bulb. Then, squeeze the bulb to get rid of air and put the stopper back in place.  Care for the tube if there is a clot in it. The doctor may tell you to squeeze the tube over the clot. You can also squeeze the tube from your skin to the bulb and then let it go. This should open the tube. Ask your doctor to show you how to do this before you go home.  Measure the amount of fluid in the drain ball after you empty it each day and write it down on a chart.  Look for signs of infection. Your doctor will want to know if you have a fever of 100.4°F (38°C) or higher, chills, if you get very red and sore around the drain, or if the fluid in the drain turns cloudy or smells. Your doctor will want to know if the skin around the drain has any fluid or pus coming out of it.  Sleep on the side opposite to the JORDAN drain. This prevents any kinking of the tubing or pulling out the suction bulb.  Avoid bumping the drain.  Your doctor may want you to hold the drain up with a safety pin while you are moving around. Ask your doctor to show you how to do this.  Talk to your doctor about when it is safe to take a bath or  shower. Ask your doctor about your activity level while you have your drain in place.  What follow-up care is needed?   Your doctor may ask you to make visits to the office to check on your progress. Be sure to keep these visits.  Your doctor will tell you when the drain may be removed.  What problems could happen?   Fluid leaking from the cut site  Bleeding  Infection  Clot in the tube  Tube and drain falls out  Cut area opens up  Drain stops working  When do I need to call the doctor?   Signs of infection at the drain site. These include swelling, redness, warmth around the wound, too much pain when touched, yellowish or greenish or bloody discharge, foul smell coming from the cut site.  Drain becomes loose, comes apart, abruptly stops draining, or falls out  Last Reviewed Date   2019-09-24  Consumer Information Use and Disclaimer   This information is not specific medical advice and does not replace information you receive from your health care provider. This is only a brief summary of general information. It does NOT include all information about conditions, illnesses, injuries, tests, procedures, treatments, therapies, discharge instructions or life-style choices that may apply to you. You must talk with your health care provider for complete information about your health and treatment options. This information should not be used to decide whether or not to accept your health care providers advice, instructions or recommendations. Only your health care provider has the knowledge and training to provide advice that is right for you.  Copyright   Copyright © 2021 [employer] and its affiliates and/or licensors. All rights reserved.

## 2024-08-05 NOTE — INTERVAL H&P NOTE
The patient has been examined and the H&P has been reviewed:    I concur with the findings and no changes have occurred since H&P was written.    Surgery risks, benefits and alternative options discussed and understood by patient/family.    All of questions were answered    Cindy Cohen MD  Breast Surgical Oncology

## 2024-08-06 VITALS
OXYGEN SATURATION: 95 % | SYSTOLIC BLOOD PRESSURE: 138 MMHG | DIASTOLIC BLOOD PRESSURE: 70 MMHG | HEIGHT: 63 IN | TEMPERATURE: 98 F | RESPIRATION RATE: 18 BRPM | BODY MASS INDEX: 19.8 KG/M2 | HEART RATE: 74 BPM | WEIGHT: 111.75 LBS

## 2024-08-06 LAB — M AVIUM PARATB TISS QL ZN STN: NORMAL

## 2024-08-08 LAB
BACTERIA SPEC ANAEROBE CULT: NORMAL
PSYCHE PATHOLOGY RESULT: NORMAL

## 2024-08-10 LAB — BACTERIA TISS AEROBE CULT: NORMAL

## 2024-08-12 ENCOUNTER — TELEPHONE (OUTPATIENT)
Dept: SURGERY | Facility: CLINIC | Age: 69
End: 2024-08-12
Payer: MEDICARE

## 2024-08-12 NOTE — TELEPHONE ENCOUNTER
Patient called with her pathology results.      ----- Message from Cindy Cohen MD sent at 8/11/2024  8:05 PM CDT -----  Please call the patient and inform pathology results revealed the cancer was completely removed and no invasive carcinoma was found. Lymph node was submitted and no evidence of malignancy. Other testing is pending, but appears negative so far. Further discussion will be had at their post-op/follow-up appointment.

## 2024-08-12 NOTE — PROGRESS NOTES
[facility] Breast Surg  Breast Surgical Oncology  Follow-Up Patient Office Visit       Referring Provider: No ref. provider found  PCP: Yana Pearce MD   Medical Oncologist: No care team member to display   Radiation Oncologist: No care team member to display   Other Care Providers:     Chief Complaint:   Chief Complaint   Patient presents with    Post-op Evaluation     Patient is here for a right JORDAN Drain check         Subjective:   Treatment History:  2024 Right Mastectomy      Interval History:  2024 - Daisy Calderon presents today for her post op appointment. She is doing well post-op. She reports minimal pain. Drain output    HPI:  Daisy Calderon is a 69 y.o. female who presents on 2024 for evaluation of newly diagnosed right  breast cancer.     A detailed patient history was obtained and reviewed. She currently denies any breast issues including rashes, redness, pain, swelling, nipple discharge, or new lumps/masses.     MG breast density: Category C (heterogeneously dense)      Imagin2023  BL SC MG at [facility]  IMPRESSION: INCOMPLETE: NEEDS ADDITIONAL IMAGING   -In the R breast upper outer quadrant middle depth, there are indeterminate calcifications.  -No other significant masses, calcifications, or other findings are seen in either breast.       2024  R DG MG at [facility]  IMPRESSION: SUSPICIOUS OF MALIGNANCY   -Amorphous calcifications in a regional distribution spanning 5.0 cm in the upper-outer quadrant of the R breast, anterior to middle depths.      2024  BL BREAST MRI  IMPRESSION: INCOMPLETE: NEEDS ADDITIONAL IMAGING EVALUATION    1.  No suspicious enhancement in either breast.    2.  Right breast 10 o'clock middle to posterior depths metallic clip and surrounding post biopsy changes related to previous stereotactic biopsy of 2024, with pathology revealing ductal carcinoma in-situ.    3.  No suspicious enhancement at the  site of recent benign stereotactic biopsy of calcifications at 12 o'clock right breast anterior depth.  4.  Several prominent level 1 lymph nodes within bilateral axillae with cortical thickening and partial hilar effacement, slightly more prominent in the right axilla compared to the left axilla, are of uncertain clinical significance.      Pathology:  2024  Stereotactic guided Core Needle Biopsy Right Breast 10 o'clock Middle Depth Calcifications  -Ductal carcinoma in situ, grade 2 with solid, cribriform and micropapillary architecture, focal comedo necrosis and multifocal microcalcifications  -Background nonproliferative fibrocystic changes with sclerosing adenosis and focal minute sclerosing intraductal papilloma  ER 92%  WA 86%     2024  Stereotactic guided Core Needle Biopsy Right Breast 12 o'clock Anterior Depth Calcifications  -Nonproliferative fibrocystic changes with extensive sclerosing adenosis and microcalcifications  -No evidence of significant epithelial hyperplasia atypia or malignancy identified    2024  Right Mastectomy- Ductal carcinoma in situ, grade 2,  solid and cribriform, with focal necrosis and calcification (measuring 9 mm); focal sclerosing adenosis; focal fibroadenomatoid change; fibrocystic change.  All margins clear.  One lymph node with no evidence of neoplasm.       OB/GYN History:  Age at Menarche Onset: 14  Menopausal Status: postmenopausal, LMP: No LMP recorded. Patient has had a hysterectomy.  Hysterectomy/Oophorectomy: hysterectomy without BSO, at age 46  Hormonal birth control (duration): No none.   Pregnancy History:   Age at first live birth: 21  Hormone Replacement Therapy: Yes, none  Patient did not breast feed.  Patient denies nipple discharge.   Patient denies to previous breast biopsy.   Patient denies to a personal history of breast cancer.     Other Relevant History:  Prior thoracic RT: none  Genetic testing: No  Ashkenazi Catholic descent: No    Family  History:  Family History   Problem Relation Name Age of Onset    Breast cancer Sister  65        Past History:  Past Medical History:   Diagnosis Date    Broken foot 2024    Diabetes type I     Ductal carcinoma in situ (DCIS) of right breast     HTN (hypertension)     Hypothyroidism, unspecified     Tobacco user         Past Surgical History:   Procedure Laterality Date    CARPAL TUNNEL RELEASE      CATARACT EXTRACTION       SECTION      CHOLECYSTECTOMY      SIMPLE MASTECTOMY Right 2024    Procedure: MASTECTOMY, SIMPLE  ///right, MagTrace to be Injected Intra-operatively;  Surgeon: Cindy Cohen MD;  Location: [facility];  Service: General;  Laterality: Right;  MagTrace to be Injected Intra-operatively    SURGICAL REMOVAL OF LYMPH NODE Right 2024    Procedure: EXCISION, LYMPH NODE  ///right, Send fresh for flow cytometry, Send for Pathology;  Surgeon: Cindy Cohen MD;  Location: [facility];  Service: General;  Laterality: Right;  Send fresh for flow cytometry  Send for Pathology    TOTAL ABDOMINAL HYSTERECTOMY      TUBAL LIGATION          Social History     Socioeconomic History    Marital status: Single   Tobacco Use    Smoking status: Former     Current packs/day: 0.25     Types: Cigarettes    Smokeless tobacco: Never   Substance and Sexual Activity    Alcohol use: Not Currently     Alcohol/week: 6.0 standard drinks of alcohol     Types: 6 Cans of beer per week     Comment: quit 4 months ago    Drug use: Never    Sexual activity: Not Currently     Social Determinants of Health     Financial Resource Strain: Low Risk  (3/4/2024)    Overall Financial Resource Strain (CARDIA)     Difficulty of Paying Living Expenses: Not hard at all   Food Insecurity: No Food Insecurity (3/4/2024)    Hunger Vital Sign     Worried About Running Out of Food in the Last Year: Never true     Ran Out of Food in the Last Year: Never true   Transportation Needs: No Transportation Needs (2024)    PRAPARE -  Transportation     Lack of Transportation (Medical): No     Lack of Transportation (Non-Medical): No   Physical Activity: Sufficiently Active (7/26/2023)    Exercise Vital Sign     Days of Exercise per Week: 5 days     Minutes of Exercise per Session: 30 min   Stress: No Stress Concern Present (7/26/2023)    [place] [employer] - Occupational Stress Questionnaire     Feeling of Stress : Not at all   Housing Stability: Low Risk  (3/4/2024)    Housing Stability Vital Sign     Unable to Pay for Housing in the Last Year: No     Number of Places Lived in the Last Year: 1     Unstable Housing in the Last Year: No        Body mass index is 19.82 kg/m².     Allergy/Medications:   Review of patient's allergies indicates:   Allergen Reactions    Aspirin      Other Reaction(s): Unknown    Levofloxacin      Other reaction(s): itching, swelling    Oxycodone      Other Reaction(s): Unknown    Penicillin      Other Reaction(s): Unknown    Brimonidine Nausea And Vomiting          Current Outpatient Medications:     insulin aspart U-100 (NOVOLOG) 100 unit/mL injection, Inject 67 Units into the skin Daily. Average taken 34 units daily, Disp: , Rfl:     latanoprost 0.005 % ophthalmic solution, Apply 1 drop to eye every evening., Disp: , Rfl:     levothyroxine (SYNTHROID) 125 MCG tablet, Take 125 mcg by mouth before breakfast., Disp: , Rfl:     lisinopriL 10 MG tablet, Take 10 mg by mouth once daily., Disp: , Rfl:     ondansetron (ZOFRAN) 4 MG tablet, Take 1 tablet (4 mg total) by mouth every 6 (six) hours as needed for Nausea., Disp: 30 tablet, Rfl: 0    zoledronic acid-mannitol & water (RECLAST) 5 mg/100 mL PgBk, Inject 5 mg into the vein once., Disp: , Rfl:     furosemide (LASIX) 20 MG tablet, Take 1 tablet (20 mg total) by mouth once daily. for 7 days, Disp: 7 tablet, Rfl: 0       Review of Systems:  Review of Systems   All other systems reviewed and are negative.          Objective:     Vitals:  Blood pressure  "107/68, pulse 90, temperature 98 °F (36.7 °C), temperature source Oral, resp. rate 18, height 5' 3" (1.6 m), weight 50.8 kg (111 lb 14.4 oz), SpO2 98%.      Physical Exam:  General: The patient is awake, alert and oriented times three. The patient is well nourished and in no acute distress.   Musculoskeletal: The patient has a normal range of motion of her bilateral upper extremities.   Breast:  Right: Examination of right mastectomy fails to reveal any dominant masses or areas of significant focal nodularity. The nipple areola complex is surgically absent. There is no skin dimpling with movement of the pectoralis. There are no significant skin changes overlying the mastectomy. Mastectomy incision is healing well. Drain with minimal output and serous drainage.  Integumentary: no rashes or skin lesions present  Neurologic: cranial nerves intact, no signs of peripheral neurological deficit, motor/sensory function intact        Assessment and Plan:      Cancer Staging   Ductal carcinoma in situ (DCIS) of right breast  Staging form: Breast, AJCC 8th Edition  - Clinical stage from 7/2/2024: Stage 0 (cTis (DCIS), cN0, cM0, ER+, CT+, HER2: Not Assessed) - Signed by Cindy Cohen MD on 7/3/2024      Encounter Diagnoses   Name Primary?    Ductal carcinoma in situ (DCIS) of right breast Yes    Status post right mastectomy      Her pathology results were discussed with her and her daughter.    Non-invasive cancer was completely removed and no need for additional surgery.     She will be sent to Medical Oncology for discussion regarding adjuvant endocrine therapy.     Her drain was removed and dressing applied. This can be removed after 24 hours.      Her follow-up and next steps were discussed.       Plan:         Problem List Items Addressed This Visit          Oncology    Ductal carcinoma in situ (DCIS) of right breast - Primary    Current Assessment & Plan     Medical Oncology Referral - re: adjuvant therapy  Clinical " follow-up in 3 months  Drain was removed today  Recommend left breast screening mammogram - due Dec 2024           Relevant Orders    Ambulatory referral/consult to Hematology / Oncology     Other Visit Diagnoses       Status post right mastectomy                    All of questions were answered    Cindy Cohen MD  Breast Surgical Oncology     OFFICE VISIT CODING:  Post-Op Visit

## 2024-08-13 ENCOUNTER — OFFICE VISIT (OUTPATIENT)
Dept: SURGERY | Facility: CLINIC | Age: 69
End: 2024-08-13
Payer: MEDICARE

## 2024-08-13 VITALS
RESPIRATION RATE: 18 BRPM | OXYGEN SATURATION: 98 % | TEMPERATURE: 98 F | DIASTOLIC BLOOD PRESSURE: 68 MMHG | BODY MASS INDEX: 19.82 KG/M2 | HEART RATE: 90 BPM | WEIGHT: 111.88 LBS | SYSTOLIC BLOOD PRESSURE: 107 MMHG | HEIGHT: 63 IN

## 2024-08-13 DIAGNOSIS — D05.11 DUCTAL CARCINOMA IN SITU (DCIS) OF RIGHT BREAST: Primary | ICD-10-CM

## 2024-08-13 DIAGNOSIS — Z90.11 STATUS POST RIGHT MASTECTOMY: ICD-10-CM

## 2024-08-13 PROCEDURE — 4010F ACE/ARB THERAPY RXD/TAKEN: CPT | Mod: CPTII,S$GLB,, | Performed by: SURGERY

## 2024-08-13 PROCEDURE — 1159F MED LIST DOCD IN RCRD: CPT | Mod: CPTII,S$GLB,, | Performed by: SURGERY

## 2024-08-13 PROCEDURE — 1160F RVW MEDS BY RX/DR IN RCRD: CPT | Mod: CPTII,S$GLB,, | Performed by: SURGERY

## 2024-08-13 PROCEDURE — 3044F HG A1C LEVEL LT 7.0%: CPT | Mod: CPTII,S$GLB,, | Performed by: SURGERY

## 2024-08-13 PROCEDURE — 99999 PR PBB SHADOW E&M-EST. PATIENT-LVL IV: CPT | Mod: PBBFAC,,, | Performed by: SURGERY

## 2024-08-13 PROCEDURE — 3074F SYST BP LT 130 MM HG: CPT | Mod: CPTII,S$GLB,, | Performed by: SURGERY

## 2024-08-13 PROCEDURE — 99024 POSTOP FOLLOW-UP VISIT: CPT | Mod: S$GLB,,, | Performed by: SURGERY

## 2024-08-13 PROCEDURE — 1126F AMNT PAIN NOTED NONE PRSNT: CPT | Mod: CPTII,S$GLB,, | Performed by: SURGERY

## 2024-08-13 PROCEDURE — 3078F DIAST BP <80 MM HG: CPT | Mod: CPTII,S$GLB,, | Performed by: SURGERY

## 2024-08-13 PROCEDURE — 3288F FALL RISK ASSESSMENT DOCD: CPT | Mod: CPTII,S$GLB,, | Performed by: SURGERY

## 2024-08-13 PROCEDURE — 1101F PT FALLS ASSESS-DOCD LE1/YR: CPT | Mod: CPTII,S$GLB,, | Performed by: SURGERY

## 2024-08-14 ENCOUNTER — PATIENT OUTREACH (OUTPATIENT)
Facility: CLINIC | Age: 69
End: 2024-08-14
Payer: MEDICARE

## 2024-08-14 LAB
LEFT EYE DM RETINOPATHY: POSITIVE
RIGHT EYE DM RETINOPATHY: POSITIVE

## 2024-08-14 NOTE — LETTER
AUTHORIZATION FOR RELEASE OF CONFIDENTIAL INFORMATION      2024      Dear Dr. Mosqueda,    We are seeing Daisy Calderon, date of birth 1955, in the clinic at [place] [address] [facility].   Yana Pearce MD is the patient's PCP. Daisy Calderon has an outstanding lab/procedure at the time we reviewed his chart.  In order to help keep her health information updated, Daisy has authorized us to request the following medical record(s):        Eye Exam - including evaluation for diabetic retinopathy              Please fax any records to Yana Pearce MD's at  [phone]      If you have any questions you can contact Tran Bonilla at [phone].             Patient Name: Daisy Calderon    : 1955    Patient Phone #: [phone]                                    Daisy Calderon  MRN: [id number]  : 1955  Age: 68 y.o.  Sex: female         Patient/Legal Guardian Signature  This signature was collected at 2023    Daisy Calderon     Self  _______________________________   Printed Name/Relationship to Patient      Consent for Examination and Treatment: I hereby authorize the providers and employees of [facility] ([facility]) to provide medical treatment/services which includes, but is not limited to, performing and administering tests and diagnostic procedures that are deemed necessary, including, but not limited to, imaging examinations, blood tests and other laboratory procedures as may be required by the hospital, clinic, or may be ordered by my physician(s) or persons working under the general and/or special instructions of my physician(s).      I understand and agree that this consent covers all authorized persons, including but not limited to physicians, residents, nurse practitioners, physicians' assistants, specialists, consultants, student nurses, and independently contracted physicians, who are called upon by the physician in charge, to carry out  the diagnostic procedures and medical or surgical treatment.     I hereby authorize [facility] to retain or dispose of any specimens or tissue, should there be such remaining from any test or procedure.     I hereby authorize and give consent for [facility] providers and employees to take photographs, images or videotapes of such diagnostic, surgical or treatment procedures of Patient as may be required by [facility] or as may be ordered by a physician. I further acknowledge and agree that [facility] may use cameras or other devices for patient monitoring.     I am aware that the practice of medicine is not an exact science, and I acknowledge that no guarantees have been made to me as to the outcome of any tests, procedures or treatment.     Authorization for Release of Information: I understand that my insurance company and/or their agents may need information necessary to make determinations about payment/reimbursement. I hereby provide authorization to release to all insurance companies, their successors, assignees, other parties with whom they may have contracted, or others acting on their behalf, that are involved with payment for any hospital and/or clinic charges incurred by the patient, any information that they request and deem necessary for payment/reimbursement, and/or quality review.  I further authorize the release of my health information to physicians or other health care practitioners on staff who are involved in my health care now and in the future, and to other health care providers, entities, or institutions for the purpose of my continued care and treatment, including referrals.     REGISTRATION AUTHORIZATION  Form No. 46380 (Rev. 7/13/2022)       Medicare Patient's Certification and Authorization to Release Information and Payment Request:  I certify that the information given by me in applying for payment under Title XVIII of the Social Security Act is correct. I authorize any joe of medical or other  information about me to release to the [facility], or its intermediaries or carriers, any information needed for this or a related Medicare claim. I request that payment of authorized benefits be made on my behalf.     Assignment of Insurance Benefits:   I hereby authorize any and all insurance companies, health plans, defined   benefit plans, health insurers or any entity that is or may be responsible for payment of my medical expenses to pay all hospital and medical benefits now due, and to become due and payable to me under any hospital benefits, sick benefits, injury benefits or any other benefit for services rendered to me, including Major Medical Benefits, direct to [facility] and all independently contracted physicians. I assign any and all rights that I may have against any and all insurance companies, health plans, defined benefit plans, health insurers or any entity that is or may be responsible for payment of my medical expenses, including, but not limited to any right to appeal a denial of a claim, any right to bring any action, lawsuit, administrative proceeding, or other cause of action on my behalf. I specifically assign my right to pursue litigation against any and all insurance companies, health plans, defined benefit plans, health insurers or any entity that is or may be responsible for payment of my medical expenses based upon a refusal to pay charges.            E. Valuables: It is understood and agreed that [facility] is not liable for the damage to or loss of any money, jewelry,   documents, dentures, eye glasses, hearing aids, prosthetics, or other property of value.     F. Computer Equipment: I understand and agree that should I choose to use computer equipment owned by [facility] or if I choose to access the Internet via [facility]s network, I do so at my own risk. [facility] is not responsible for any damage to my computer equipment or to any damages of any type that might arise from  my loss of equipment or data.     G. Acceptance of Financial Responsibility:  I agree that in consideration of the services and   supplies that have been   or will be furnished to the patient, I am hereby obligated to pay all charges made for or on the account of the patient according to the standard rates (in effect at the time the services and supplies are delivered) established by [facility], including its Patient Financial Assistance Policy to the extent it is applicable. I understand that I am responsible for all charges, or portions thereof, not covered by insurance or other sources. Patient refunds will be distributed only after balances at all [facility] facilities are paid.     H. Communication Authorization:  I hereby authorize [facility] and its representatives, along with any billing service   or  who may work on their behalf, to contact me on   my cell phone and/or home phone using pre- recorded messages, artificial voice messages, automatic telephone dialing devices or other computer assisted technology, or by electronic      mail, text messaging, or by any other form of electronic communication. This includes, but is not limited to, appointment reminders, yearly physical exam reminders, preventive care reminders, patient campaigns, welcome calls, and calls about account balances on my account or any account on which I am listed as a guarantor. I understand I have the right to opt out of these communications at any time.      Relationship  Between  Facility and  Provider:      I understand that some, but not all, providers furnishing services to the patient are not employees or agents of [facility]. The patient is under the care and supervision of his/her attending physician, and it is the responsibility of the facility and its nursing staff to carry out the instructions of such physicians. It is the responsibility of the patient's physician/designee to obtain the patient's informed consent, when  required, for medical or surgical treatment, special diagnostic or therapeutic procedures, or hospital services rendered for the patient under the special instructions of the physician/designee.     REGISTRATION AUTHORIZATION  Form No. 61861 (Rev. 7/13/2022)      Notice of Privacy Practices: I acknowledge I have received a copy of [facility]'s Notice of Privacy Practices.     Facility  Directory: I have discussed with the organization my desire to be either included or excluded  in the facility directory in the event of my being an inpatient at an [facility] facility. I understand that if my choice is to opt-out of being identified in the facility directory that the facility will not provide any information about me such as my condition (e.g. fair, stable, etc.) or my location in the facility (e.g., room number, department).     Immunizations: [facility] shares immunization information with state sponsored health departments to help you and your doctor keep track of your immunization records. By signing, you consent to have this information shared with the health department in your state:                                [place] - LINKS ([place] Immunization Network for Kids Statewide)                                [place] - MIIX ([place] Immunization Information eXchange)                                [place] - ImmPRINT (Immunization Patient Registry with Integrated Technology)     TERM: This authorization is valid for this and subsequent care/treatment I receive at [facility] and will remain valid unless/until revoked in writing by me.     [facility]: As used in this document, [facility] means all [facility] owned and managed facilities, including, but not limited to, all health centers, surgery centers, clinics, urgent care centers, and hospitals.         [facility] complies with applicable Federal civil rights laws and does not discriminate on the basis of race, color, national origin,  age, disability, or sex.  ATENCIÓN: si habla español, tiene a sharp disposición servicios gratuitos de asistencia lingüística. Llgirma al [phone].  CHÚ Ý: N?u b?n nói Ti?ng Vi?t, có các d?ch v? h? tr? ngôn ng? mi?n phí dành cho b?n. G?i s? [phone].        REGISTRATION AUTHORIZATION  Form No. 77591 (Rev. 7/13/2022)   On

## 2024-08-14 NOTE — PROGRESS NOTES
Health Maintenance Topic(s) Outreach Outcomes & Actions Taken:    Eye Exam - Outreach Outcomes & Actions Taken  : Diabetic Eye External Records Uploaded, Care Team & History Updated if Applicable       Additional Notes:  DM Eye Exam 8/1/24

## 2024-08-16 NOTE — ASSESSMENT & PLAN NOTE
Medical Oncology Referral - re: adjuvant therapy  Clinical follow-up in 3 months  Drain was removed today  Recommend left breast screening mammogram - due Dec 2024

## 2024-08-19 NOTE — PROGRESS NOTES
[facility] Breast Surg  Breast Surgical Oncology  Follow-Up Patient Office Visit       Referring Provider: No ref. provider found  PCP: aYna Pearce MD   Medical Oncologist: No care team member to display   Radiation Oncologist: No care team member to display   Other Care Providers:     Chief Complaint:   Chief Complaint   Patient presents with    Follow-up     Patient c/o mild swelling x 1 week no redness         Subjective:   Treatment History:  2024 Right Mastectomy      Interval History:  2024 - Daisy Calderon presents today for her post op appointment. She is doing well post-op. She reports minimal pain.    2024- Pt presents to clinic today for her second post-op appointment. She states the incision is still a little sore but is improving. Of note, she states she feels dizzy this morning. Her blood pressure is a little lower than usual. She took her BP medicine last night.    HPI:  Daisy Calderon is a 69 y.o. female who presents on 2024 for evaluation of newly diagnosed right  breast cancer.     A detailed patient history was obtained and reviewed. She currently denies any breast issues including rashes, redness, pain, swelling, nipple discharge, or new lumps/masses.     MG breast density: Category C (heterogeneously dense)      Imagin2023  BL SC MG at [facility]  IMPRESSION: INCOMPLETE: NEEDS ADDITIONAL IMAGING   -In the R breast upper outer quadrant middle depth, there are indeterminate calcifications.  -No other significant masses, calcifications, or other findings are seen in either breast.       2024  R DG MG at [facility]  IMPRESSION: SUSPICIOUS OF MALIGNANCY   -Amorphous calcifications in a regional distribution spanning 5.0 cm in the upper-outer quadrant of the R breast, anterior to middle depths.      2024  BL BREAST MRI  IMPRESSION: INCOMPLETE: NEEDS ADDITIONAL IMAGING EVALUATION    1.  No suspicious enhancement in either  breast.    2.  Right breast 10 o'clock middle to posterior depths metallic clip and surrounding post biopsy changes related to previous stereotactic biopsy of 2024, with pathology revealing ductal carcinoma in-situ.    3.  No suspicious enhancement at the site of recent benign stereotactic biopsy of calcifications at 12 o'clock right breast anterior depth.  4.  Several prominent level 1 lymph nodes within bilateral axillae with cortical thickening and partial hilar effacement, slightly more prominent in the right axilla compared to the left axilla, are of uncertain clinical significance.      Pathology:  2024  Stereotactic guided Core Needle Biopsy Right Breast 10 o'clock Middle Depth Calcifications  -Ductal carcinoma in situ, grade 2 with solid, cribriform and micropapillary architecture, focal comedo necrosis and multifocal microcalcifications  -Background nonproliferative fibrocystic changes with sclerosing adenosis and focal minute sclerosing intraductal papilloma  ER 92%  VT 86%     2024  Stereotactic guided Core Needle Biopsy Right Breast 12 o'clock Anterior Depth Calcifications  -Nonproliferative fibrocystic changes with extensive sclerosing adenosis and microcalcifications  -No evidence of significant epithelial hyperplasia atypia or malignancy identified    2024  Right Mastectomy- Ductal carcinoma in situ, grade 2,  solid and cribriform, with focal necrosis and calcification (measuring 9 mm); focal sclerosing adenosis; focal fibroadenomatoid change; fibrocystic change.  All margins clear.  One lymph node with no evidence of neoplasm.       OB/GYN History:  Age at Menarche Onset: 14  Menopausal Status: postmenopausal, LMP: No LMP recorded. Patient has had a hysterectomy.  Hysterectomy/Oophorectomy: hysterectomy without BSO, at age 46  Hormonal birth control (duration): No none.   Pregnancy History:   Age at first live birth: 21  Hormone Replacement Therapy: Yes, none  Patient did not  breast feed.  Patient denies nipple discharge.   Patient denies to previous breast biopsy.   Patient denies to a personal history of breast cancer.     Other Relevant History:  Prior thoracic RT: none  Genetic testing: No  Ashkenazi Protestant descent: No    Family History:  Family History   Problem Relation Name Age of Onset    Breast cancer Sister  65        Past History:  Past Medical History:   Diagnosis Date    Broken foot 2024    Diabetes type I     Ductal carcinoma in situ (DCIS) of right breast     HTN (hypertension)     Hypothyroidism, unspecified     Tobacco user         Past Surgical History:   Procedure Laterality Date    CARPAL TUNNEL RELEASE      CATARACT EXTRACTION       SECTION      CHOLECYSTECTOMY      SIMPLE MASTECTOMY Right 2024    Procedure: MASTECTOMY, SIMPLE  ///right, MagTrace to be Injected Intra-operatively;  Surgeon: Cindy Cohen MD;  Location: [facility];  Service: General;  Laterality: Right;  MagTrace to be Injected Intra-operatively    SURGICAL REMOVAL OF LYMPH NODE Right 2024    Procedure: EXCISION, LYMPH NODE  ///right, Send fresh for flow cytometry, Send for Pathology;  Surgeon: Cindy Cohen MD;  Location: [facility] OR;  Service: General;  Laterality: Right;  Send fresh for flow cytometry  Send for Pathology    TOTAL ABDOMINAL HYSTERECTOMY      TUBAL LIGATION          Social History     Socioeconomic History    Marital status: Single   Tobacco Use    Smoking status: Former     Current packs/day: 0.25     Types: Cigarettes    Smokeless tobacco: Never   Substance and Sexual Activity    Alcohol use: Not Currently     Alcohol/week: 6.0 standard drinks of alcohol     Types: 6 Cans of beer per week     Comment: quit 4 months ago    Drug use: Never    Sexual activity: Not Currently     Social Determinants of Health     Financial Resource Strain: Low Risk  (3/4/2024)    Overall Financial Resource Strain (CARDIA)     Difficulty of Paying Living Expenses: Not hard at all    Food Insecurity: No Food Insecurity (3/4/2024)    Hunger Vital Sign     Worried About Running Out of Food in the Last Year: Never true     Ran Out of Food in the Last Year: Never true   Transportation Needs: No Transportation Needs (4/8/2024)    PRAPARE - Transportation     Lack of Transportation (Medical): No     Lack of Transportation (Non-Medical): No   Physical Activity: Sufficiently Active (7/26/2023)    Exercise Vital Sign     Days of Exercise per Week: 5 days     Minutes of Exercise per Session: 30 min   Stress: No Stress Concern Present (7/26/2023)    [employer] - Occupational Stress Questionnaire     Feeling of Stress : Not at all   Housing Stability: Low Risk  (3/4/2024)    Housing Stability Vital Sign     Unable to Pay for Housing in the Last Year: No     Number of Places Lived in the Last Year: 1     Unstable Housing in the Last Year: No        Body mass index is 20.97 kg/m².     Allergy/Medications:   Review of patient's allergies indicates:   Allergen Reactions    Aspirin      Other Reaction(s): Unknown    Levofloxacin      Other reaction(s): itching, swelling    Oxycodone      Other Reaction(s): Unknown    Penicillin      Other Reaction(s): Unknown    Brimonidine Nausea And Vomiting          Current Outpatient Medications:     furosemide (LASIX) 20 MG tablet, Take 1 tablet (20 mg total) by mouth once daily. for 7 days, Disp: 7 tablet, Rfl: 0    insulin aspart U-100 (NOVOLOG) 100 unit/mL injection, Inject 67 Units into the skin Daily. Average taken 34 units daily, Disp: , Rfl:     latanoprost 0.005 % ophthalmic solution, Apply 1 drop to eye every evening., Disp: , Rfl:     levothyroxine (SYNTHROID) 125 MCG tablet, Take 125 mcg by mouth before breakfast., Disp: , Rfl:     lisinopriL 10 MG tablet, Take 10 mg by mouth once daily., Disp: , Rfl:     ondansetron (ZOFRAN) 4 MG tablet, Take 1 tablet (4 mg total) by mouth every 6 (six) hours as needed for Nausea., Disp: 30  "tablet, Rfl: 0    zoledronic acid-mannitol & water (RECLAST) 5 mg/100 mL PgBk, Inject 5 mg into the vein once., Disp: , Rfl:        Review of Systems:  Review of Systems   All other systems reviewed and are negative.      Objective:     Vitals:  Blood pressure (!) 113/57, pulse 81, temperature 97.9 °F (36.6 °C), temperature source Oral, resp. rate 18, height 5' 3" (1.6 m), weight 53.7 kg (118 lb 6.4 oz), SpO2 97%.      Physical Exam:  General: The patient is awake, alert and oriented times three. The patient is well nourished and in no acute distress.   Musculoskeletal: The patient has a normal range of motion of her bilateral upper extremities.   Breast:  Right: Examination of right mastectomy fails to reveal any dominant masses or areas of significant focal nodularity. The nipple areola complex is surgically absent. There is no skin dimpling with movement of the pectoralis. There are no significant skin changes overlying the mastectomy. Mastectomy incision is healing well.  Integumentary: no rashes or skin lesions present  Neurologic: cranial nerves intact, no signs of peripheral neurological deficit, motor/sensory function intact        Assessment and Plan:      Cancer Staging   Ductal carcinoma in situ (DCIS) of right breast  Staging form: Breast, AJCC 8th Edition  - Clinical stage from 7/2/2024: Stage 0 (cTis (DCIS), cN0, cM0, ER+, MS+, HER2: Not Assessed) - Signed by Cindy Cohen MD on 7/3/2024  - Pathologic stage from 8/13/2024: Stage 0 (pTis (DCIS), cN0, cM0, ER+, MS+, HER2: Not Assessed) - Signed by Cindy Cohen MD on 8/16/2024    Incision appears to be healing well with swelling slowly improving. Discussed the need for her to reach out to her PCP regarding her blood pressure medication. She has recently stopped smoking and drinking caffeine. We discussed with her to not take her medication if her SBP is less than 120 or if her DBP is less than 80.        Plan:         Problem List Items " Addressed This Visit          Oncology    Ductal carcinoma in situ (DCIS) of right breast - Primary    Current Assessment & Plan     Medical oncology Referral- re: adjuvant therapy (sent)  Clinical follow-up in 3 months   Left breast screening mammogram- due Dec 2024  PCP follow-up- re: blood pressure medication          Roque Alanis MD  [facility] General Surgery PGY-1      All of questions were answered    Cindy Cohen MD  Breast Surgical Oncology     OFFICE VISIT CODING:  Post-Op Visit

## 2024-08-20 ENCOUNTER — OFFICE VISIT (OUTPATIENT)
Dept: SURGERY | Facility: CLINIC | Age: 69
End: 2024-08-20
Payer: MEDICARE

## 2024-08-20 ENCOUNTER — TELEPHONE (OUTPATIENT)
Dept: INTERNAL MEDICINE | Facility: CLINIC | Age: 69
End: 2024-08-20
Payer: MEDICARE

## 2024-08-20 VITALS
HEART RATE: 81 BPM | WEIGHT: 118.38 LBS | SYSTOLIC BLOOD PRESSURE: 113 MMHG | HEIGHT: 63 IN | RESPIRATION RATE: 18 BRPM | BODY MASS INDEX: 20.98 KG/M2 | OXYGEN SATURATION: 97 % | TEMPERATURE: 98 F | DIASTOLIC BLOOD PRESSURE: 57 MMHG

## 2024-08-20 DIAGNOSIS — D05.11 DUCTAL CARCINOMA IN SITU (DCIS) OF RIGHT BREAST: Primary | ICD-10-CM

## 2024-08-20 PROCEDURE — 3288F FALL RISK ASSESSMENT DOCD: CPT | Mod: CPTII,S$GLB,, | Performed by: SURGERY

## 2024-08-20 PROCEDURE — 1126F AMNT PAIN NOTED NONE PRSNT: CPT | Mod: CPTII,S$GLB,, | Performed by: SURGERY

## 2024-08-20 PROCEDURE — 4010F ACE/ARB THERAPY RXD/TAKEN: CPT | Mod: CPTII,S$GLB,, | Performed by: SURGERY

## 2024-08-20 PROCEDURE — 99024 POSTOP FOLLOW-UP VISIT: CPT | Mod: S$GLB,,, | Performed by: SURGERY

## 2024-08-20 PROCEDURE — 3074F SYST BP LT 130 MM HG: CPT | Mod: CPTII,S$GLB,, | Performed by: SURGERY

## 2024-08-20 PROCEDURE — 1160F RVW MEDS BY RX/DR IN RCRD: CPT | Mod: CPTII,S$GLB,, | Performed by: SURGERY

## 2024-08-20 PROCEDURE — 3078F DIAST BP <80 MM HG: CPT | Mod: CPTII,S$GLB,, | Performed by: SURGERY

## 2024-08-20 PROCEDURE — 1159F MED LIST DOCD IN RCRD: CPT | Mod: CPTII,S$GLB,, | Performed by: SURGERY

## 2024-08-20 PROCEDURE — 3044F HG A1C LEVEL LT 7.0%: CPT | Mod: CPTII,S$GLB,, | Performed by: SURGERY

## 2024-08-20 PROCEDURE — 99999 PR PBB SHADOW E&M-EST. PATIENT-LVL IV: CPT | Mod: PBBFAC,,, | Performed by: SURGERY

## 2024-08-20 PROCEDURE — 1101F PT FALLS ASSESS-DOCD LE1/YR: CPT | Mod: CPTII,S$GLB,, | Performed by: SURGERY

## 2024-08-20 NOTE — ASSESSMENT & PLAN NOTE
Medical oncology Referral- re: adjuvant therapy (sent)  Clinical follow-up in 3 months   Left breast screening mammogram- due Dec 2024  PCP follow-up- re: blood pressure medication

## 2024-08-20 NOTE — Clinical Note
Ms. Calderon, had a little low blood pressure today (95/53). She has not taken her blood pressure meds for several days and blood pressures have been per her report. She did take her Lisinopril last night however, and reports feeling a little dizzy today. She has stopped smoking and drinking coffee as recommended for surgery. She is recovering well from surgery.

## 2024-08-20 NOTE — TELEPHONE ENCOUNTER
Called and spoke to the patient.  She stated blood pressure did improve after drinking 1 cup of coffee.  At this time she is advised to hold her lisinopril and monitor blood pressure closely.  If it starts to get above 135/85 mm of Hg, she can start with half of the dose which would be 5 mg of lisinopril and we will titrate as appropriate.  She verbalized understanding

## 2024-08-26 ENCOUNTER — TELEPHONE (OUTPATIENT)
Dept: HEMATOLOGY/ONCOLOGY | Facility: CLINIC | Age: 69
End: 2024-08-26
Payer: MEDICARE

## 2024-08-26 NOTE — TELEPHONE ENCOUNTER
Spoke with patient regarding new patient appointment 8/27/24 with Dr. Lejeune. Patient denied concerns or questions at this time. Confirmed appointment date, time and location with patient.

## 2024-08-27 ENCOUNTER — OFFICE VISIT (OUTPATIENT)
Dept: HEMATOLOGY/ONCOLOGY | Facility: CLINIC | Age: 69
End: 2024-08-27
Payer: MEDICARE

## 2024-08-27 VITALS
RESPIRATION RATE: 18 BRPM | DIASTOLIC BLOOD PRESSURE: 70 MMHG | OXYGEN SATURATION: 98 % | HEART RATE: 86 BPM | TEMPERATURE: 98 F | WEIGHT: 121.63 LBS | BODY MASS INDEX: 21.55 KG/M2 | HEIGHT: 63 IN | SYSTOLIC BLOOD PRESSURE: 146 MMHG

## 2024-08-27 DIAGNOSIS — D05.11 DUCTAL CARCINOMA IN SITU (DCIS) OF RIGHT BREAST: ICD-10-CM

## 2024-08-27 PROCEDURE — 1160F RVW MEDS BY RX/DR IN RCRD: CPT | Mod: CPTII,S$GLB,, | Performed by: STUDENT IN AN ORGANIZED HEALTH CARE EDUCATION/TRAINING PROGRAM

## 2024-08-27 PROCEDURE — 99205 OFFICE O/P NEW HI 60 MIN: CPT | Mod: S$GLB,,, | Performed by: STUDENT IN AN ORGANIZED HEALTH CARE EDUCATION/TRAINING PROGRAM

## 2024-08-27 PROCEDURE — 1126F AMNT PAIN NOTED NONE PRSNT: CPT | Mod: CPTII,S$GLB,, | Performed by: STUDENT IN AN ORGANIZED HEALTH CARE EDUCATION/TRAINING PROGRAM

## 2024-08-27 PROCEDURE — 99999 PR PBB SHADOW E&M-EST. PATIENT-LVL IV: CPT | Mod: PBBFAC,,, | Performed by: STUDENT IN AN ORGANIZED HEALTH CARE EDUCATION/TRAINING PROGRAM

## 2024-08-27 PROCEDURE — 1159F MED LIST DOCD IN RCRD: CPT | Mod: CPTII,S$GLB,, | Performed by: STUDENT IN AN ORGANIZED HEALTH CARE EDUCATION/TRAINING PROGRAM

## 2024-08-27 PROCEDURE — 3044F HG A1C LEVEL LT 7.0%: CPT | Mod: CPTII,S$GLB,, | Performed by: STUDENT IN AN ORGANIZED HEALTH CARE EDUCATION/TRAINING PROGRAM

## 2024-08-27 PROCEDURE — 3078F DIAST BP <80 MM HG: CPT | Mod: CPTII,S$GLB,, | Performed by: STUDENT IN AN ORGANIZED HEALTH CARE EDUCATION/TRAINING PROGRAM

## 2024-08-27 PROCEDURE — 3008F BODY MASS INDEX DOCD: CPT | Mod: CPTII,S$GLB,, | Performed by: STUDENT IN AN ORGANIZED HEALTH CARE EDUCATION/TRAINING PROGRAM

## 2024-08-27 PROCEDURE — 3077F SYST BP >= 140 MM HG: CPT | Mod: CPTII,S$GLB,, | Performed by: STUDENT IN AN ORGANIZED HEALTH CARE EDUCATION/TRAINING PROGRAM

## 2024-08-27 PROCEDURE — 4010F ACE/ARB THERAPY RXD/TAKEN: CPT | Mod: CPTII,S$GLB,, | Performed by: STUDENT IN AN ORGANIZED HEALTH CARE EDUCATION/TRAINING PROGRAM

## 2024-08-27 RX ORDER — LETROZOLE 2.5 MG/1
2.5 TABLET, FILM COATED ORAL DAILY
Qty: 90 TABLET | Refills: 0 | Status: SHIPPED | OUTPATIENT
Start: 2024-08-27 | End: 2024-11-25

## 2024-10-15 ENCOUNTER — LAB VISIT (OUTPATIENT)
Dept: LAB | Facility: HOSPITAL | Age: 69
End: 2024-10-15
Attending: STUDENT IN AN ORGANIZED HEALTH CARE EDUCATION/TRAINING PROGRAM
Payer: MEDICARE

## 2024-10-15 ENCOUNTER — OFFICE VISIT (OUTPATIENT)
Dept: HEMATOLOGY/ONCOLOGY | Facility: CLINIC | Age: 69
End: 2024-10-15
Payer: MEDICARE

## 2024-10-15 VITALS
HEART RATE: 79 BPM | WEIGHT: 127.81 LBS | BODY MASS INDEX: 22.64 KG/M2 | DIASTOLIC BLOOD PRESSURE: 61 MMHG | TEMPERATURE: 98 F | HEIGHT: 63 IN | RESPIRATION RATE: 18 BRPM | SYSTOLIC BLOOD PRESSURE: 126 MMHG | OXYGEN SATURATION: 97 %

## 2024-10-15 DIAGNOSIS — D05.11 DUCTAL CARCINOMA IN SITU (DCIS) OF RIGHT BREAST: ICD-10-CM

## 2024-10-15 DIAGNOSIS — D05.11 DUCTAL CARCINOMA IN SITU (DCIS) OF RIGHT BREAST: Primary | ICD-10-CM

## 2024-10-15 DIAGNOSIS — Z79.811 LONG TERM CURRENT USE OF AROMATASE INHIBITOR: ICD-10-CM

## 2024-10-15 LAB
ALBUMIN SERPL-MCNC: 3.9 G/DL (ref 3.4–4.8)
ALBUMIN/GLOB SERPL: 1.2 RATIO (ref 1.1–2)
ALP SERPL-CCNC: 71 UNIT/L (ref 40–150)
ALT SERPL-CCNC: 20 UNIT/L (ref 0–55)
ANION GAP SERPL CALC-SCNC: 8 MEQ/L
AST SERPL-CCNC: 20 UNIT/L (ref 5–34)
BASOPHILS # BLD AUTO: 0.01 X10(3)/MCL
BASOPHILS NFR BLD AUTO: 0.2 %
BILIRUB SERPL-MCNC: 0.2 MG/DL
BUN SERPL-MCNC: 15.3 MG/DL (ref 9.8–20.1)
CALCIUM SERPL-MCNC: 9.7 MG/DL (ref 8.4–10.2)
CHLORIDE SERPL-SCNC: 106 MMOL/L (ref 98–107)
CO2 SERPL-SCNC: 28 MMOL/L (ref 23–31)
CREAT SERPL-MCNC: 0.89 MG/DL (ref 0.55–1.02)
CREAT/UREA NIT SERPL: 17
EOSINOPHIL # BLD AUTO: 1.09 X10(3)/MCL (ref 0–0.9)
EOSINOPHIL NFR BLD AUTO: 16.6 %
ERYTHROCYTE [DISTWIDTH] IN BLOOD BY AUTOMATED COUNT: 11.8 % (ref 11.5–17)
GFR SERPLBLD CREATININE-BSD FMLA CKD-EPI: >60 ML/MIN/1.73/M2
GLOBULIN SER-MCNC: 3.3 GM/DL (ref 2.4–3.5)
GLUCOSE SERPL-MCNC: 147 MG/DL (ref 82–115)
HCT VFR BLD AUTO: 38.8 % (ref 37–47)
HGB BLD-MCNC: 13 G/DL (ref 12–16)
IMM GRANULOCYTES # BLD AUTO: 0.02 X10(3)/MCL (ref 0–0.04)
IMM GRANULOCYTES NFR BLD AUTO: 0.3 %
LYMPHOCYTES # BLD AUTO: 2.62 X10(3)/MCL (ref 0.6–4.6)
LYMPHOCYTES NFR BLD AUTO: 40 %
MCH RBC QN AUTO: 32.3 PG (ref 27–31)
MCHC RBC AUTO-ENTMCNC: 33.5 G/DL (ref 33–36)
MCV RBC AUTO: 96.5 FL (ref 80–94)
MONOCYTES # BLD AUTO: 0.38 X10(3)/MCL (ref 0.1–1.3)
MONOCYTES NFR BLD AUTO: 5.8 %
NEUTROPHILS # BLD AUTO: 2.43 X10(3)/MCL (ref 2.1–9.2)
NEUTROPHILS NFR BLD AUTO: 37.1 %
PLATELET # BLD AUTO: 291 X10(3)/MCL (ref 130–400)
PMV BLD AUTO: 9 FL (ref 7.4–10.4)
POTASSIUM SERPL-SCNC: 5.7 MMOL/L (ref 3.5–5.1)
PROT SERPL-MCNC: 7.2 GM/DL (ref 5.8–7.6)
RBC # BLD AUTO: 4.02 X10(6)/MCL (ref 4.2–5.4)
SODIUM SERPL-SCNC: 142 MMOL/L (ref 136–145)
WBC # BLD AUTO: 6.55 X10(3)/MCL (ref 4.5–11.5)

## 2024-10-15 PROCEDURE — 3074F SYST BP LT 130 MM HG: CPT | Mod: CPTII,S$GLB,,

## 2024-10-15 PROCEDURE — 3008F BODY MASS INDEX DOCD: CPT | Mod: CPTII,S$GLB,,

## 2024-10-15 PROCEDURE — 1160F RVW MEDS BY RX/DR IN RCRD: CPT | Mod: CPTII,S$GLB,,

## 2024-10-15 PROCEDURE — 1159F MED LIST DOCD IN RCRD: CPT | Mod: CPTII,S$GLB,,

## 2024-10-15 PROCEDURE — 3078F DIAST BP <80 MM HG: CPT | Mod: CPTII,S$GLB,,

## 2024-10-15 PROCEDURE — 1126F AMNT PAIN NOTED NONE PRSNT: CPT | Mod: CPTII,S$GLB,,

## 2024-10-15 PROCEDURE — 99999 PR PBB SHADOW E&M-EST. PATIENT-LVL III: CPT | Mod: PBBFAC,,,

## 2024-10-15 PROCEDURE — 80053 COMPREHEN METABOLIC PANEL: CPT

## 2024-10-15 PROCEDURE — 36415 COLL VENOUS BLD VENIPUNCTURE: CPT

## 2024-10-15 PROCEDURE — 85025 COMPLETE CBC W/AUTO DIFF WBC: CPT

## 2024-10-15 PROCEDURE — 4010F ACE/ARB THERAPY RXD/TAKEN: CPT | Mod: CPTII,S$GLB,,

## 2024-10-15 PROCEDURE — 99215 OFFICE O/P EST HI 40 MIN: CPT | Mod: S$GLB,,,

## 2024-10-15 PROCEDURE — 3044F HG A1C LEVEL LT 7.0%: CPT | Mod: CPTII,S$GLB,,

## 2024-10-15 NOTE — PROGRESS NOTES
Subjective:       Patient ID: Daisy Calderon is a 69 y.o. female.    Chief Complaint: Follow up    Diagnosis: DCIS Right Breast    Current Treatment: Letrozole 2.5mg po daily 8/2024- present    Treatment History:   Right Breast Mastectomy 8/5/24 - Dr. Cohen    HPI:   68 yo F presents in August '24 for evaluation of right breast DCIS. In December '23 she had abnormal screening MMG with calcifications noted in the UOQ of R breast spanning 5 cm. June '24 biopsy at 10 o'clock position with DCIS, G2, ER 92%,   WI 86%. 12 o'clock position biopsy returned with benign findings. She underwent right mastectomy on 8/5/24 with Dr. Cohen, final pathology with 9mm G2 DCIS. All margins negative. 1 LN without evidence of invasive disease.     Her diagnosis, staging, and prognosis were discussed. The NCCN guidelines when discussing next steps in her treatment plan were referenced.     The mechanism, benefits, and side effect profile of Letrozole was reviewed with patient. The benefits described include reduced risk of contralateral breast cancer and reduced risk of distant metastatic disease. Side effects discussed include joint pain, hot flashes, vaginal dryness, arthralgias and loss of bone mineral density. The need for DEXA scan to monitor bone mineral density as well as supplemental calcium and vitamin D were discussed.    Interval History:   Patient here today for 7 week NP follow up appointment and labs for toxicity check after starting Letrozole.   Reports increased fatigue, dizziness and worsening arthralgias after starting Letrozole.   She says she was unable to get out of bed for up to 3 days or stand for long periods of time to cook a meal. She stopped the Letrozole for 2 days without improvement and then restarted.    She says her bp meds were stopped post op for hypotension.  She have also had changes to her insulin given uncontrolled blood sugars and hypoglycemia  She have mild intermittent shooting pains at  the  "incision site. He incision have completely healed.  Discussed concerns about her mental health given she stated she was unstable.  She states she was "joking"  and denies any mental health concerns.  Otherwise no major issues or concerns.       Past Medical History:   Diagnosis Date    Broken foot 2024    Diabetes type I     Ductal carcinoma in situ (DCIS) of right breast     HTN (hypertension)     Hypothyroidism, unspecified     Tobacco user       Past Surgical History:   Procedure Laterality Date    CARPAL TUNNEL RELEASE      CATARACT EXTRACTION       SECTION      CHOLECYSTECTOMY      SIMPLE MASTECTOMY Right 2024    Procedure: MASTECTOMY, SIMPLE  ///right, MagTrace to be Injected Intra-operatively;  Surgeon: Cindy Cohen MD;  [facility];  Service: General;  Laterality: Right;  MagTrace to be Injected Intra-operatively    SURGICAL REMOVAL OF LYMPH NODE Right 2024    Procedure: EXCISION, LYMPH NODE  ///right, Send fresh for flow cytometry, Send for Pathology;  Surgeon: Cindy Cohen MD;  Location: [facility] OR;  Service: General;  Laterality: Right;  Send fresh for flow cytometry  Send for Pathology    TOTAL ABDOMINAL HYSTERECTOMY      TUBAL LIGATION       Social History     Socioeconomic History    Marital status: Single   Tobacco Use    Smoking status: Former     Current packs/day: 0.25     Types: Cigarettes    Smokeless tobacco: Never   Substance and Sexual Activity    Alcohol use: Not Currently     Alcohol/week: 6.0 standard drinks of alcohol     Types: 6 Cans of beer per week     Comment: quit 4 months ago    Drug use: Never    Sexual activity: Not Currently     Social Drivers of Health     Financial Resource Strain: Low Risk  (3/4/2024)    Overall Financial Resource Strain (CARDIA)     Difficulty of Paying Living Expenses: Not hard at all   Food Insecurity: No Food Insecurity (3/4/2024)    Hunger Vital Sign     Worried About Running Out of Food in the Last Year: Never true     " Ran Out of Food in the Last Year: Never true   Transportation Needs: No Transportation Needs (4/8/2024)    PRAPARE - Transportation     Lack of Transportation (Medical): No     Lack of Transportation (Non-Medical): No   Physical Activity: Sufficiently Active (7/26/2023)    Exercise Vital Sign     Days of Exercise per Week: 5 days     Minutes of Exercise per Session: 30 min   Stress: No Stress Concern Present (7/26/2023)    [employer] - Occupational Stress Questionnaire     Feeling of Stress : Not at all   Housing Stability: Low Risk  (3/4/2024)    Housing Stability Vital Sign     Unable to Pay for Housing in the Last Year: No     Number of Places Lived in the Last Year: 1     Unstable Housing in the Last Year: No      Family History   Problem Relation Name Age of Onset    Breast cancer Sister  65      Review of patient's allergies indicates:   Allergen Reactions    Aspirin      Other Reaction(s): Unknown    Levofloxacin      Other reaction(s): itching, swelling    Oxycodone      Other Reaction(s): Unknown    Penicillin      Other Reaction(s): Unknown    Brimonidine Nausea And Vomiting      Review of Systems   Constitutional:  Positive for fatigue.   HENT: Negative.     Eyes: Negative.    Respiratory: Negative.     Cardiovascular: Negative.    Gastrointestinal: Negative.    Endocrine: Negative.    Genitourinary: Negative.    Musculoskeletal:  Positive for arthralgias.   Integumentary:  Negative.   Allergic/Immunologic: Negative.    Neurological:  Positive for dizziness.   Hematological: Negative.    Psychiatric/Behavioral:  The patient is nervous/anxious.          Objective:      Vitals:    10/15/24 1359   BP: 126/61   Pulse: 79   Resp: 18   Temp: 97.8 °F (36.6 °C)         Physical Exam  HENT:      Head: Normocephalic.      Right Ear: Tympanic membrane normal.      Left Ear: Tympanic membrane normal.      Nose: Nose normal.   Eyes:      Extraocular Movements: Extraocular movements  intact.      Pupils: Pupils are equal, round, and reactive to light.   Cardiovascular:      Rate and Rhythm: Normal rate and regular rhythm.      Pulses: Normal pulses.      Heart sounds: Normal heart sounds.   Pulmonary:      Effort: Pulmonary effort is normal.      Breath sounds: Normal breath sounds.   Abdominal:      General: Abdomen is flat.      Palpations: Abdomen is soft.   Genitourinary:     Comments: Exam deferred  Musculoskeletal:      Cervical back: Normal range of motion.   Skin:     General: Skin is warm and dry.      Comments: Right mastectomy incision healed well   Neurological:      Mental Status: She is alert and oriented to person, place, and time.   Psychiatric:         Mood and Affect: Mood normal.         LABS AND IMAGING REVIEWED IN EPIC    Imagin2023  BL SC MG at [facility]  IMPRESSION: INCOMPLETE: NEEDS ADDITIONAL IMAGING   -In the R breast upper outer quadrant middle depth, there are indeterminate calcifications.  -No other significant masses, calcifications, or other findings are seen in either breast.       2024  R DG MG at [facility]  IMPRESSION: SUSPICIOUS OF MALIGNANCY   -Amorphous calcifications in a regional distribution spanning 5.0 cm in the upper-outer quadrant of the R breast, anterior to middle depths.      2024  BL BREAST MRI  IMPRESSION: INCOMPLETE: NEEDS ADDITIONAL IMAGING EVALUATION    1.  No suspicious enhancement in either breast.    2.  Right breast 10 o'clock middle to posterior depths metallic clip and surrounding post biopsy changes related to previous stereotactic biopsy of 2024, with pathology revealing ductal carcinoma in-situ.    3.  No suspicious enhancement at the site of recent benign stereotactic biopsy of calcifications at 12 o'clock right breast anterior depth.  4.  Several prominent level 1 lymph nodes within bilateral axillae with cortical thickening and partial hilar effacement, slightly more prominent in the right axilla compared to the  left axilla, are of uncertain clinical significance.        Pathology:  6/17/2024  Stereotactic guided Core Needle Biopsy Right Breast 10 o'clock Middle Depth Calcifications  -Ductal carcinoma in situ, grade 2 with solid, cribriform and micropapillary architecture, focal comedo necrosis and multifocal microcalcifications  -Background nonproliferative fibrocystic changes with sclerosing adenosis and focal minute sclerosing intraductal papilloma  ER 92%  OK 86%     6/17/2024  Stereotactic guided Core Needle Biopsy Right Breast 12 o'clock Anterior Depth Calcifications  -Nonproliferative fibrocystic changes with extensive sclerosing adenosis and microcalcifications  -No evidence of significant epithelial hyperplasia atypia or malignancy identified     8/5/2024  Right Mastectomy- Ductal carcinoma in situ, grade 2,  solid and cribriform, with focal necrosis and calcification (measuring 9 mm); focal sclerosing adenosis; focal fibroadenomatoid change; fibrocystic change.  All margins clear.  One lymph node with no evidence of neoplasm.      Assessment:   Right Breast DCIS - ER+/OK+. S/p mastectomy August '24. Plan for AI x 5 years in adjuvant setting.     Dexa scan 7/27/22  Plan:   - Toxicity reviewed; will hold Letrozole for now given her symptoms will follow up for improvement  - Follow up with PCP for bp and diabetes management  - Monitor for dizziness; if no improvement will further eval  - Request DEXA scan   - RTC 2 weeks for NP visit, labs same day for toxicity check    I spent a total of 40 minutes on the day of the visit.This includes face to face time and non-face to face time preparing to see the patient (eg, review of tests), obtaining and/or reviewing separately obtained history, documenting clinical information in the electronic or other health record, independently interpreting results and communicating results to the patient/family/caregiver, or care coordinator.          Sandra Dawson,  FNP-C  Hematology/Oncology   [facility]

## 2024-10-28 PROBLEM — Z00.00 MEDICARE ANNUAL WELLNESS VISIT, SUBSEQUENT: Status: RESOLVED | Noted: 2022-07-25 | Resolved: 2024-10-28

## 2024-10-29 ENCOUNTER — LAB VISIT (OUTPATIENT)
Dept: LAB | Facility: HOSPITAL | Age: 69
End: 2024-10-29
Attending: STUDENT IN AN ORGANIZED HEALTH CARE EDUCATION/TRAINING PROGRAM
Payer: MEDICARE

## 2024-10-29 ENCOUNTER — OFFICE VISIT (OUTPATIENT)
Dept: HEMATOLOGY/ONCOLOGY | Facility: CLINIC | Age: 69
End: 2024-10-29
Payer: MEDICARE

## 2024-10-29 VITALS
DIASTOLIC BLOOD PRESSURE: 80 MMHG | HEIGHT: 63 IN | BODY MASS INDEX: 22.77 KG/M2 | SYSTOLIC BLOOD PRESSURE: 142 MMHG | WEIGHT: 128.5 LBS | TEMPERATURE: 98 F | OXYGEN SATURATION: 98 % | HEART RATE: 85 BPM | RESPIRATION RATE: 18 BRPM

## 2024-10-29 DIAGNOSIS — D05.11 DUCTAL CARCINOMA IN SITU (DCIS) OF RIGHT BREAST: Primary | ICD-10-CM

## 2024-10-29 DIAGNOSIS — D05.11 DUCTAL CARCINOMA IN SITU (DCIS) OF RIGHT BREAST: ICD-10-CM

## 2024-10-29 LAB
ALBUMIN SERPL-MCNC: 3.7 G/DL (ref 3.4–4.8)
ALBUMIN/GLOB SERPL: 1.1 RATIO (ref 1.1–2)
ALP SERPL-CCNC: 66 UNIT/L (ref 40–150)
ALT SERPL-CCNC: 26 UNIT/L (ref 0–55)
ANION GAP SERPL CALC-SCNC: 6 MEQ/L
AST SERPL-CCNC: 22 UNIT/L (ref 5–34)
BASOPHILS # BLD AUTO: 0.01 X10(3)/MCL
BASOPHILS NFR BLD AUTO: 0.2 %
BILIRUB SERPL-MCNC: 0.3 MG/DL
BUN SERPL-MCNC: 10.4 MG/DL (ref 9.8–20.1)
CALCIUM SERPL-MCNC: 9.4 MG/DL (ref 8.4–10.2)
CHLORIDE SERPL-SCNC: 101 MMOL/L (ref 98–107)
CO2 SERPL-SCNC: 29 MMOL/L (ref 23–31)
CREAT SERPL-MCNC: 0.84 MG/DL (ref 0.55–1.02)
CREAT/UREA NIT SERPL: 12
EOSINOPHIL # BLD AUTO: 0.71 X10(3)/MCL (ref 0–0.9)
EOSINOPHIL NFR BLD AUTO: 15.6 %
ERYTHROCYTE [DISTWIDTH] IN BLOOD BY AUTOMATED COUNT: 11.6 % (ref 11.5–17)
GFR SERPLBLD CREATININE-BSD FMLA CKD-EPI: >60 ML/MIN/1.73/M2
GLOBULIN SER-MCNC: 3.4 GM/DL (ref 2.4–3.5)
GLUCOSE SERPL-MCNC: 353 MG/DL (ref 82–115)
HCT VFR BLD AUTO: 35.8 % (ref 37–47)
HGB BLD-MCNC: 12.2 G/DL (ref 12–16)
IMM GRANULOCYTES # BLD AUTO: 0.01 X10(3)/MCL (ref 0–0.04)
IMM GRANULOCYTES NFR BLD AUTO: 0.2 %
LYMPHOCYTES # BLD AUTO: 1.73 X10(3)/MCL (ref 0.6–4.6)
LYMPHOCYTES NFR BLD AUTO: 38 %
MCH RBC QN AUTO: 32.3 PG (ref 27–31)
MCHC RBC AUTO-ENTMCNC: 34.1 G/DL (ref 33–36)
MCV RBC AUTO: 94.7 FL (ref 80–94)
MONOCYTES # BLD AUTO: 0.28 X10(3)/MCL (ref 0.1–1.3)
MONOCYTES NFR BLD AUTO: 6.2 %
NEUTROPHILS # BLD AUTO: 1.81 X10(3)/MCL (ref 2.1–9.2)
NEUTROPHILS NFR BLD AUTO: 39.8 %
PLATELET # BLD AUTO: 217 X10(3)/MCL (ref 130–400)
PMV BLD AUTO: 9.1 FL (ref 7.4–10.4)
POTASSIUM SERPL-SCNC: 4.3 MMOL/L (ref 3.5–5.1)
PROT SERPL-MCNC: 7.1 GM/DL (ref 5.8–7.6)
RBC # BLD AUTO: 3.78 X10(6)/MCL (ref 4.2–5.4)
SODIUM SERPL-SCNC: 136 MMOL/L (ref 136–145)
WBC # BLD AUTO: 4.55 X10(3)/MCL (ref 4.5–11.5)

## 2024-10-29 PROCEDURE — 80053 COMPREHEN METABOLIC PANEL: CPT

## 2024-10-29 PROCEDURE — 1126F AMNT PAIN NOTED NONE PRSNT: CPT | Mod: CPTII,S$GLB,,

## 2024-10-29 PROCEDURE — 1159F MED LIST DOCD IN RCRD: CPT | Mod: CPTII,S$GLB,,

## 2024-10-29 PROCEDURE — 3079F DIAST BP 80-89 MM HG: CPT | Mod: CPTII,S$GLB,,

## 2024-10-29 PROCEDURE — 36415 COLL VENOUS BLD VENIPUNCTURE: CPT

## 2024-10-29 PROCEDURE — 1160F RVW MEDS BY RX/DR IN RCRD: CPT | Mod: CPTII,S$GLB,,

## 2024-10-29 PROCEDURE — 99215 OFFICE O/P EST HI 40 MIN: CPT | Mod: S$GLB,,,

## 2024-10-29 PROCEDURE — 3044F HG A1C LEVEL LT 7.0%: CPT | Mod: CPTII,S$GLB,,

## 2024-10-29 PROCEDURE — 3008F BODY MASS INDEX DOCD: CPT | Mod: CPTII,S$GLB,,

## 2024-10-29 PROCEDURE — 4010F ACE/ARB THERAPY RXD/TAKEN: CPT | Mod: CPTII,S$GLB,,

## 2024-10-29 PROCEDURE — 85025 COMPLETE CBC W/AUTO DIFF WBC: CPT

## 2024-10-29 PROCEDURE — 3077F SYST BP >= 140 MM HG: CPT | Mod: CPTII,S$GLB,,

## 2024-10-29 PROCEDURE — 99999 PR PBB SHADOW E&M-EST. PATIENT-LVL III: CPT | Mod: PBBFAC,,,

## 2024-11-12 ENCOUNTER — OFFICE VISIT (OUTPATIENT)
Dept: SURGERY | Facility: CLINIC | Age: 69
End: 2024-11-12
Payer: MEDICARE

## 2024-11-12 VITALS
HEIGHT: 63 IN | OXYGEN SATURATION: 97 % | BODY MASS INDEX: 23.04 KG/M2 | HEART RATE: 84 BPM | SYSTOLIC BLOOD PRESSURE: 125 MMHG | RESPIRATION RATE: 20 BRPM | WEIGHT: 130 LBS | DIASTOLIC BLOOD PRESSURE: 64 MMHG | TEMPERATURE: 98 F

## 2024-11-12 DIAGNOSIS — D05.11 DUCTAL CARCINOMA IN SITU (DCIS) OF RIGHT BREAST: Primary | ICD-10-CM

## 2024-11-12 DIAGNOSIS — Z90.11 STATUS POST RIGHT MASTECTOMY: ICD-10-CM

## 2024-11-12 PROCEDURE — 1126F AMNT PAIN NOTED NONE PRSNT: CPT | Mod: CPTII,S$GLB,, | Performed by: PHYSICIAN ASSISTANT

## 2024-11-12 PROCEDURE — 3078F DIAST BP <80 MM HG: CPT | Mod: CPTII,S$GLB,, | Performed by: PHYSICIAN ASSISTANT

## 2024-11-12 PROCEDURE — 3044F HG A1C LEVEL LT 7.0%: CPT | Mod: CPTII,S$GLB,, | Performed by: PHYSICIAN ASSISTANT

## 2024-11-12 PROCEDURE — 1160F RVW MEDS BY RX/DR IN RCRD: CPT | Mod: CPTII,S$GLB,, | Performed by: PHYSICIAN ASSISTANT

## 2024-11-12 PROCEDURE — 3074F SYST BP LT 130 MM HG: CPT | Mod: CPTII,S$GLB,, | Performed by: PHYSICIAN ASSISTANT

## 2024-11-12 PROCEDURE — 3008F BODY MASS INDEX DOCD: CPT | Mod: CPTII,S$GLB,, | Performed by: PHYSICIAN ASSISTANT

## 2024-11-12 PROCEDURE — 1159F MED LIST DOCD IN RCRD: CPT | Mod: CPTII,S$GLB,, | Performed by: PHYSICIAN ASSISTANT

## 2024-11-12 PROCEDURE — 99999 PR PBB SHADOW E&M-EST. PATIENT-LVL V: CPT | Mod: PBBFAC,,, | Performed by: PHYSICIAN ASSISTANT

## 2024-11-12 PROCEDURE — 4010F ACE/ARB THERAPY RXD/TAKEN: CPT | Mod: CPTII,S$GLB,, | Performed by: PHYSICIAN ASSISTANT

## 2024-11-12 PROCEDURE — 99214 OFFICE O/P EST MOD 30 MIN: CPT | Mod: S$GLB,,, | Performed by: PHYSICIAN ASSISTANT

## 2024-11-12 RX ORDER — UBIDECARENONE 50 MG
CAPSULE ORAL
COMMUNITY

## 2024-11-12 RX ORDER — TRAMADOL HYDROCHLORIDE 50 MG/1
50 TABLET ORAL EVERY 6 HOURS PRN
COMMUNITY

## 2024-11-12 NOTE — PROGRESS NOTES
[facility] Breast Surg  Breast Surgical Oncology  Follow-Up Patient Office Visit       Referring Provider: No ref. provider found  PCP: Yana Pearce MD   Medical Oncologist: No care team member to display   Radiation Oncologist: No care team member to display   Other Care Providers:     Chief Complaint:   Chief Complaint   Patient presents with    Follow-up     Patient reports numbness in right upper arm (inside) and right mastectomy x a few weeks, denies swelling, pain, redness        Subjective:   Treatment History:  2024 Right Mastectomy  Letrozole started 2024.    Interval History:  2024 - Daisy Calderon is here for three-month follow-up.  She is doing well since her surgery.  She started letrozole in the interval but was having some dizziness and was advised to hold on her medication until after she completes workup to determine origin of her dizziness.  Patient also states that she has numbness to her right mastectomy and to her right upper inner arm, which is common following a mastectomy and sentinel lymph node biopsy.    HPI:  Daisy Calderon is a 69 y.o. female who initially presented on 2024 for evaluation of newly diagnosed right  breast cancer.     A detailed patient history was obtained and reviewed. She currently denies any breast issues including rashes, redness, pain, swelling, nipple discharge, or new lumps/masses.     MG breast density: Category C (heterogeneously dense)      Imagin2023  BL SC MG at [facility]  IMPRESSION: INCOMPLETE: NEEDS ADDITIONAL IMAGING   -In the R breast upper outer quadrant middle depth, there are indeterminate calcifications.  -No other significant masses, calcifications, or other findings are seen in either breast.       2024  R DG MG at [facility]  IMPRESSION: SUSPICIOUS OF MALIGNANCY   -Amorphous calcifications in a regional distribution spanning 5.0 cm in the upper-outer quadrant of the R breast,  anterior to middle depths.      7/17/2024  BL BREAST MRI  IMPRESSION: INCOMPLETE: NEEDS ADDITIONAL IMAGING EVALUATION    1.  No suspicious enhancement in either breast.    2.  Right breast 10 o'clock middle to posterior depths metallic clip and surrounding post biopsy changes related to previous stereotactic biopsy of 6/17/2024, with pathology revealing ductal carcinoma in-situ.    3.  No suspicious enhancement at the site of recent benign stereotactic biopsy of calcifications at 12 o'clock right breast anterior depth.  4.  Several prominent level 1 lymph nodes within bilateral axillae with cortical thickening and partial hilar effacement, slightly more prominent in the right axilla compared to the left axilla, are of uncertain clinical significance.      Pathology:  6/17/2024  Stereotactic guided Core Needle Biopsy Right Breast 10 o'clock Middle Depth Calcifications  -Ductal carcinoma in situ, grade 2 with solid, cribriform and micropapillary architecture, focal comedo necrosis and multifocal microcalcifications  -Background nonproliferative fibrocystic changes with sclerosing adenosis and focal minute sclerosing intraductal papilloma  ER 92%  SD 86%     6/17/2024  Stereotactic guided Core Needle Biopsy Right Breast 12 o'clock Anterior Depth Calcifications  -Nonproliferative fibrocystic changes with extensive sclerosing adenosis and microcalcifications  -No evidence of significant epithelial hyperplasia atypia or malignancy identified    8/5/2024  Right Mastectomy- Ductal carcinoma in situ, grade 2,  solid and cribriform, with focal necrosis and calcification (measuring 9 mm); focal sclerosing adenosis; focal fibroadenomatoid change; fibrocystic change.  All margins clear.  One lymph node with no evidence of neoplasm.       OB/GYN History:  Age at Menarche Onset: 14  Menopausal Status: postmenopausal, LMP: No LMP recorded. Patient has had a hysterectomy.  Hysterectomy/Oophorectomy: hysterectomy without BSO, at age  46  Hormonal birth control (duration): No none.   Pregnancy History:   Age at first live birth: 21  Hormone Replacement Therapy: Yes, none  Patient did not breast feed.  Patient denies nipple discharge.   Patient denies to previous breast biopsy.   Patient denies to a personal history of breast cancer.     Other Relevant History:  Prior thoracic RT: none  Genetic testing: No  Ashkenazi Restoration descent: No    Family History:  Family History   Problem Relation Name Age of Onset    Breast cancer Sister  65        Past History:  Past Medical History:   Diagnosis Date    Broken foot 2024    Diabetes type I     Ductal carcinoma in situ (DCIS) of right breast     HTN (hypertension)     Hypothyroidism, unspecified     Tobacco user         Past Surgical History:   Procedure Laterality Date    CARPAL TUNNEL RELEASE      CATARACT EXTRACTION       SECTION      CHOLECYSTECTOMY      SIMPLE MASTECTOMY Right 2024    Procedure: MASTECTOMY, SIMPLE  ///right, MagTrace to be Injected Intra-operatively;  Surgeon: Cindy Cohen MD;  Location: [facility];  Service: General;  Laterality: Right;  MagTrace to be Injected Intra-operatively    SURGICAL REMOVAL OF LYMPH NODE Right 2024    Procedure: EXCISION, LYMPH NODE  ///right, Send fresh for flow cytometry, Send for Pathology;  Surgeon: Cindy Cohen MD;  Location: [facility] OR;  Service: General;  Laterality: Right;  Send fresh for flow cytometry  Send for Pathology    TOTAL ABDOMINAL HYSTERECTOMY      TUBAL LIGATION          Social History     Socioeconomic History    Marital status: Single   Tobacco Use    Smoking status: Former     Current packs/day: 0.25     Types: Cigarettes    Smokeless tobacco: Never   Substance and Sexual Activity    Alcohol use: Not Currently     Alcohol/week: 6.0 standard drinks of alcohol     Types: 6 Cans of beer per week     Comment: quit 4 months ago    Drug use: Never    Sexual activity: Not Currently     Social Drivers of Health      Financial Resource Strain: Low Risk  (3/4/2024)    Overall Financial Resource Strain (CARDIA)     Difficulty of Paying Living Expenses: Not hard at all   Food Insecurity: No Food Insecurity (3/4/2024)    Hunger Vital Sign     Worried About Running Out of Food in the Last Year: Never true     Ran Out of Food in the Last Year: Never true   Transportation Needs: No Transportation Needs (4/8/2024)    PRAPARE - Transportation     Lack of Transportation (Medical): No     Lack of Transportation (Non-Medical): No   Physical Activity: Sufficiently Active (7/26/2023)    Exercise Vital Sign     Days of Exercise per Week: 5 days     Minutes of Exercise per Session: 30 min   Stress: No Stress Concern Present (7/26/2023)    [employer] - Occupational Stress Questionnaire     Feeling of Stress : Not at all   Housing Stability: Low Risk  (3/4/2024)    Housing Stability Vital Sign     Unable to Pay for Housing in the Last Year: No     Number of Places Lived in the Last Year: 1     Unstable Housing in the Last Year: No        Body mass index is 23.03 kg/m².     Allergy/Medications:   Review of patient's allergies indicates:   Allergen Reactions    Letrozole analogues Swelling and Rash     Swelling in feet/ankles, increased joint pain    Aspirin      Other Reaction(s): Unknown    Levofloxacin      Other reaction(s): itching, swelling    Oxycodone      Other Reaction(s): Unknown    Penicillin      Other Reaction(s): Unknown    Brimonidine Nausea And Vomiting          Current Outpatient Medications:     insulin aspart U-100 (NOVOLOG) 100 unit/mL injection, Inject 67 Units into the skin Daily. Average taken 34 units daily, Disp: , Rfl:     latanoprost 0.005 % ophthalmic solution, Apply 1 drop to eye every evening., Disp: , Rfl:     levothyroxine (SYNTHROID) 125 MCG tablet, Take 125 mcg by mouth before breakfast., Disp: , Rfl:     lisinopriL 10 MG tablet, Take 10 mg by mouth once daily., Disp: , Rfl:      "ondansetron (ZOFRAN) 4 MG tablet, Take 1 tablet (4 mg total) by mouth every 6 (six) hours as needed for Nausea., Disp: 30 tablet, Rfl: 0    red yeast rice 600 mg Tab, Take by mouth., Disp: , Rfl:     traMADoL (ULTRAM) 50 mg tablet, Take 50 mg by mouth every 6 (six) hours as needed for Pain., Disp: , Rfl:     furosemide (LASIX) 20 MG tablet, Take 1 tablet (20 mg total) by mouth once daily. for 7 days (Patient not taking: Reported on 11/12/2024), Disp: 7 tablet, Rfl: 0    letrozole (FEMARA) 2.5 mg Tab, Take 1 tablet (2.5 mg total) by mouth once daily. (Patient not taking: Reported on 11/12/2024), Disp: 90 tablet, Rfl: 0    zoledronic acid-mannitol & water (RECLAST) 5 mg/100 mL PgBk, Inject 5 mg into the vein once. (Patient not taking: Reported on 11/12/2024), Disp: , Rfl:        Review of Systems:  Review of Systems   All other systems reviewed and are negative.      Objective:     Vitals:  Blood pressure 125/64, pulse 84, temperature 97.9 °F (36.6 °C), temperature source Oral, resp. rate 20, height 5' 3" (1.6 m), weight 59 kg (130 lb), SpO2 97%.      Physical Exam:  General: The patient is awake, alert and oriented times three. The patient is well nourished and in no acute distress.  Neck: There is no evidence of palpable cervical, supraclavicular or axillary adenopathy. The neck is supple. The thyroid is not enlarged.  Musculoskeletal: The patient has a normal range of motion of her bilateral upper extremities.  Chest: Examination of the chest wall fails to reveal any obvious abnormalities. Nonlabored breathing, symmetric expansion.  Breast:  Right: Examination of the Right mastectomy fails to reveal any dominant masses or areas of significant focal nodularity. The nipple is surgically absent. There are no significant skin changes overlying the breasts. There is no skin dimpling with movement of the pectoralis.   Left:  Examination of the left breast fails to reveal any dominant masses or areas of significant focal " nodularity. The nipple is everted without evidence of discharge. There is no skin dimpling with movement of the pectoralis. There are no significant skin changes overlying the breast.  Abdomen: The abdomen is soft, flat, nontender and nondistended.  Integumentary: no rashes or skin lesions present  Neurologic: cranial nerves intact, no signs of peripheral neurological deficit, motor/sensory function intact      Assessment and Plan:      Cancer Staging   Ductal carcinoma in situ (DCIS) of right breast  Staging form: Breast, AJCC 8th Edition  - Clinical stage from 7/2/2024: Stage 0 (cTis (DCIS), cN0, cM0, ER+, MN+, HER2: Not Assessed) - Signed by Cindy Cohen MD on 7/3/2024  - Pathologic stage from 8/13/2024: Stage 0 (pTis (DCIS), cN0, cM0, ER+, MN+, HER2: Not Assessed) - Signed by Cindy Cohen MD on 8/16/2024           Plan:     Left screening mammogram due in December.  Recommend clinical follow up in 6 months for CBE.  Continue follow up with Oncology regarding adjuvant therapies.    Recommend follow up with PCP for workup of dizziness.    Healthy lifestyle guidelines were reviewed. She was encouraged to engage in regular exercise, maintain a healthy body weight, and avoid excessive alcohol consumption. Healthy nutritional guidelines were also discussed. Self-breast examination was reviewed with the patient in detail and she was encouraged to perform this on a monthly basis.    CC: Dr. Pearce      All of her questions were answered. She was advised to call if she develops any questions or concerns.    Mariah Alexander PA-C        Total time on the date of the visit ranged from 30-39 mins (19815). Total time includes both face-to-face and non-face-to-face time personally spent by myself on the day of the visit.    Non-face-to-face time included:  _X_ preparing to see the patient such as reviewing the patient record  __ obtaining and reviewing separately obtained history  _X_ independently  interpreting results  _X_ documenting clinical information in electronic health record.    Face-to-face time included:  _X_ performing an appropriate history and examination  _X_ communicating results to the patient  _X_ counseling and educating the patient  __ ordering appropriate medications  __ ordering appropriate tests  _X_ ordering appropriate procedures (including follow-up)  _X_ answering any questions the patient had    Total Time spent on date of visit: 35 minutes

## 2024-11-25 ENCOUNTER — TELEPHONE (OUTPATIENT)
Dept: INTERNAL MEDICINE | Facility: CLINIC | Age: 69
End: 2024-11-25
Payer: MEDICARE

## 2024-11-25 NOTE — TELEPHONE ENCOUNTER
----- Message from [facility] sent at 11/25/2024  2:51 PM CST -----  .Type:  Sooner Apoointment Request    Caller is requesting a sooner appointment.  Caller declined first available appointment listed below.  Caller will not accept being placed on the waitlist and is requesting a message be sent to doctor.  Name of Caller:pt  When is the first available appointment?12/09/2024  Symptoms:dizziness  Would the patient rather a call back or a response via [facility]?   Best Call Back Number:[phone]   Additional Information: Please call back about a sooner appt on a Monday or Tuesday

## 2024-12-02 ENCOUNTER — OFFICE VISIT (OUTPATIENT)
Dept: INTERNAL MEDICINE | Facility: CLINIC | Age: 69
End: 2024-12-02
Payer: MEDICARE

## 2024-12-02 VITALS
HEIGHT: 63 IN | SYSTOLIC BLOOD PRESSURE: 132 MMHG | HEART RATE: 99 BPM | BODY MASS INDEX: 23.04 KG/M2 | DIASTOLIC BLOOD PRESSURE: 68 MMHG | WEIGHT: 130 LBS | OXYGEN SATURATION: 97 %

## 2024-12-02 DIAGNOSIS — Z23 NEED FOR INFLUENZA VACCINATION: Primary | ICD-10-CM

## 2024-12-02 DIAGNOSIS — E03.9 ACQUIRED HYPOTHYROIDISM: ICD-10-CM

## 2024-12-02 DIAGNOSIS — R42 DIZZINESS: ICD-10-CM

## 2024-12-02 DIAGNOSIS — E10.3593 TYPE 1 DIABETES MELLITUS WITH PROLIFERATIVE DIABETIC RETINOPATHY WITHOUT MACULAR EDEMA, BILATERAL: ICD-10-CM

## 2024-12-02 DIAGNOSIS — D05.11 DUCTAL CARCINOMA IN SITU (DCIS) OF RIGHT BREAST: ICD-10-CM

## 2024-12-02 PROBLEM — I15.2 HYPERTENSION DUE TO ENDOCRINE DISORDER: Status: RESOLVED | Noted: 2022-07-25 | Resolved: 2024-12-02

## 2024-12-02 PROCEDURE — G0008 ADMIN INFLUENZA VIRUS VAC: HCPCS | Mod: ,,, | Performed by: INTERNAL MEDICINE

## 2024-12-02 PROCEDURE — 3044F HG A1C LEVEL LT 7.0%: CPT | Mod: CPTII,,, | Performed by: INTERNAL MEDICINE

## 2024-12-02 PROCEDURE — 90653 IIV ADJUVANT VACCINE IM: CPT | Mod: ,,, | Performed by: INTERNAL MEDICINE

## 2024-12-02 PROCEDURE — 3008F BODY MASS INDEX DOCD: CPT | Mod: CPTII,,, | Performed by: INTERNAL MEDICINE

## 2024-12-02 PROCEDURE — 1160F RVW MEDS BY RX/DR IN RCRD: CPT | Mod: CPTII,,, | Performed by: INTERNAL MEDICINE

## 2024-12-02 PROCEDURE — 99214 OFFICE O/P EST MOD 30 MIN: CPT | Mod: ,,, | Performed by: INTERNAL MEDICINE

## 2024-12-02 PROCEDURE — 1125F AMNT PAIN NOTED PAIN PRSNT: CPT | Mod: CPTII,,, | Performed by: INTERNAL MEDICINE

## 2024-12-02 PROCEDURE — 3078F DIAST BP <80 MM HG: CPT | Mod: CPTII,,, | Performed by: INTERNAL MEDICINE

## 2024-12-02 PROCEDURE — 4010F ACE/ARB THERAPY RXD/TAKEN: CPT | Mod: CPTII,,, | Performed by: INTERNAL MEDICINE

## 2024-12-02 PROCEDURE — 3075F SYST BP GE 130 - 139MM HG: CPT | Mod: CPTII,,, | Performed by: INTERNAL MEDICINE

## 2024-12-02 PROCEDURE — 1159F MED LIST DOCD IN RCRD: CPT | Mod: CPTII,,, | Performed by: INTERNAL MEDICINE

## 2024-12-02 RX ORDER — TRAMADOL HYDROCHLORIDE 50 MG/1
50 TABLET ORAL EVERY 6 HOURS PRN
COMMUNITY

## 2024-12-02 NOTE — ASSESSMENT & PLAN NOTE
-patient is being followed by medical oncology   -was given letrozole however patient had significant side effects and has been currently off of it, since she has been off of the letrozole, her symptoms are much improved   -is supposed to get started on evenity

## 2024-12-02 NOTE — PROGRESS NOTES
Subjective:      Patient ID: Daisy Calderon is a 69 y.o. female.    Chief Complaint: Dizziness    Ms. Villa is a 69-year-old female accompanied by her daughter that is here today with complaints of dizziness.  Comorbidities include diabetes mellitus type 1, hypothyroidism and diagnosis of DCIS status post right mastectomy with axillary lymph node excision and intolerance to letrozole with upcoming appointment with oncology to discuss further options.    Will need to get started on treatment for osteoporosis as well.    At our last visit patient had some lower extremity swelling for which Lasix was ordered however this was only for 7 days.  This seems to have since resolved.    Patient always struggled with keeping up with hydration.  Emphasized on importance of adequate water intake.  Patient's blood pressure is on the lower side of normal.  Also emphasized on importance of not skipping meals in wearing compression socks.  If with the above measures, symptoms continue, may consider further workup with carotid ultrasound    The patient's Health Maintenance was reviewed and the following appears to be due at this time:   Health Maintenance Due   Topic Date Due    Hepatitis C Screening  Never done    Diabetes Urine Screening  Never done    Low Dose Statin  Never done    Shingles Vaccine (1 of 2) Never done    TETANUS VACCINE  10/25/2015    RSV Vaccine (Age 60+ and Pregnant patients) (1 - Risk 60-74 years 1-dose series) Never done    Foot Exam  2024    COVID-19 Vaccine (2024- season) 2024    Hemoglobin A1c  2024    Mammogram  2024        Past Medical History:  Past Medical History:   Diagnosis Date    Broken foot 2024    Diabetes type I     Ductal carcinoma in situ (DCIS) of right breast     HTN (hypertension)     Hypothyroidism, unspecified     Tobacco user      Past Surgical History:   Procedure Laterality Date    CARPAL TUNNEL RELEASE      CATARACT EXTRACTION        SECTION      CHOLECYSTECTOMY      SIMPLE MASTECTOMY Right 8/5/2024    Procedure: MASTECTOMY, SIMPLE  ///right, MagTrace to be Injected Intra-operatively;  Surgeon: Cindy Cohen MD;  Location: [facility];  Service: General;  Laterality: Right;  MagTrace to be Injected Intra-operatively    SURGICAL REMOVAL OF LYMPH NODE Right 8/5/2024    Procedure: EXCISION, LYMPH NODE  ///right, Send fresh for flow cytometry, Send for Pathology;  Surgeon: Cindy Cohen MD;  Location: [facility];  Service: General;  Laterality: Right;  Send fresh for flow cytometry  Send for Pathology    TOTAL ABDOMINAL HYSTERECTOMY      TUBAL LIGATION       Review of patient's allergies indicates:   Allergen Reactions    Letrozole analogues Swelling and Rash     Swelling in feet/ankles, increased joint pain    Aspirin      Other Reaction(s): Unknown    Levofloxacin      Other reaction(s): itching, swelling    Oxycodone      Other Reaction(s): Unknown    Penicillin      Other Reaction(s): Unknown    Brimonidine Nausea And Vomiting     Social History     Socioeconomic History    Marital status: Single   Tobacco Use    Smoking status: Former     Current packs/day: 0.25     Types: Cigarettes    Smokeless tobacco: Never   Substance and Sexual Activity    Alcohol use: Not Currently     Alcohol/week: 6.0 standard drinks of alcohol     Types: 6 Cans of beer per week     Comment: quit 4 months ago    Drug use: Never    Sexual activity: Not Currently     Social Drivers of Health     Financial Resource Strain: Low Risk  (3/4/2024)    Overall Financial Resource Strain (CARDIA)     Difficulty of Paying Living Expenses: Not hard at all   Food Insecurity: No Food Insecurity (3/4/2024)    Hunger Vital Sign     Worried About Running Out of Food in the Last Year: Never true     Ran Out of Food in the Last Year: Never true   Transportation Needs: No Transportation Needs (4/8/2024)    PRAPARE - Transportation     Lack of Transportation (Medical): No     Lack of  "Transportation (Non-Medical): No   Physical Activity: Sufficiently Active (7/26/2023)    Exercise Vital Sign     Days of Exercise per Week: 5 days     Minutes of Exercise per Session: 30 min   Stress: No Stress Concern Present (7/26/2023)    [employer] [place] [employer] - Occupational Stress Questionnaire     Feeling of Stress : Not at all   Housing Stability: Low Risk  (3/4/2024)    Housing Stability Vital Sign     Unable to Pay for Housing in the Last Year: No     Number of Places Lived in the Last Year: 1     Unstable Housing in the Last Year: No     Family History   Problem Relation Name Age of Onset    Breast cancer Sister  65       Review of Systems    A comprehensive review of systems was performed and is negative except for that stated above  Objective:   /68 (BP Location: Right arm, Patient Position: Sitting)   Pulse 99   Ht 5' 3" (1.6 m)   Wt 59 kg (130 lb)   SpO2 97%   BMI 23.03 kg/m²     Physical Exam  Constitutional:       Appearance: Normal appearance.   HENT:      Head: Normocephalic and atraumatic.      Nose: Nose normal.      Mouth/Throat:      Mouth: Mucous membranes are moist.      Pharynx: Oropharynx is clear.   Eyes:      Extraocular Movements: Extraocular movements intact.      Pupils: Pupils are equal, round, and reactive to light.   Cardiovascular:      Rate and Rhythm: Normal rate and regular rhythm.      Pulses: Normal pulses.   Pulmonary:      Effort: Pulmonary effort is normal.      Breath sounds: Normal breath sounds.   Abdominal:      General: Bowel sounds are normal.      Palpations: Abdomen is soft.   Musculoskeletal:         General: Normal range of motion.      Cervical back: Normal range of motion and neck supple.   Skin:     General: Skin is warm.   Neurological:      General: No focal deficit present.      Mental Status: She is alert and oriented to person, place, and time. Mental status is at baseline.   Psychiatric:         Mood and Affect: Mood normal. "       Assessment/ Plan:   1. Need for influenza vaccination  -     influenza (adjuvanted) (Fluad) 45 mcg/0.5 mL IM vaccine (> or = 66 yo) 0.5 mL    2. Ductal carcinoma in situ (DCIS) of right breast  Assessment & Plan:  -patient is being followed by medical oncology   -was given letrozole however patient had significant side effects and has been currently off of it, since she has been off of the letrozole, her symptoms are much improved   -is supposed to get started on evenity      3. 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 Acquired hypothyroidism  Assessment & Plan:    Continue levothyroxine 125 mcg p.o. daily  Take medicine on an empty stomach with water (no other medications or beverages). Wait 30 minutes to eat or drink.  Report any symptoms of thinning hair, breaking nails, fatigue, weight gain or loss, palpitations.        5. Dizziness  Overview:  -probably related to inadequate hydration   -emphasized on importance of hydration, blood pressure on the lower side of normal   -patient has quit smoking, excluded caffeine etc. which might be contributing   -emphasized on compression socks

## 2024-12-02 NOTE — ASSESSMENT & PLAN NOTE
Continue levothyroxine 125 mcg p.o. daily  Take medicine on an empty stomach with water (no other medications or beverages). Wait 30 minutes to eat or drink.  Report any symptoms of thinning hair, breaking nails, fatigue, weight gain or loss, palpitations.

## 2024-12-16 ENCOUNTER — OFFICE VISIT (OUTPATIENT)
Dept: HEMATOLOGY/ONCOLOGY | Facility: CLINIC | Age: 69
End: 2024-12-16
Payer: MEDICARE

## 2024-12-16 ENCOUNTER — HOSPITAL ENCOUNTER (OUTPATIENT)
Dept: RADIOLOGY | Facility: HOSPITAL | Age: 69
Discharge: HOME | End: 2024-12-16
Attending: SURGERY
Payer: MEDICARE

## 2024-12-16 VITALS
BODY MASS INDEX: 23.54 KG/M2 | DIASTOLIC BLOOD PRESSURE: 69 MMHG | WEIGHT: 132.88 LBS | RESPIRATION RATE: 18 BRPM | TEMPERATURE: 98 F | HEART RATE: 92 BPM | OXYGEN SATURATION: 97 % | HEIGHT: 63 IN | SYSTOLIC BLOOD PRESSURE: 146 MMHG

## 2024-12-16 DIAGNOSIS — D05.11 DUCTAL CARCINOMA IN SITU (DCIS) OF RIGHT BREAST: Primary | ICD-10-CM

## 2024-12-16 DIAGNOSIS — Z12.31 ENCOUNTER FOR SCREENING MAMMOGRAM FOR BREAST CANCER: ICD-10-CM

## 2024-12-16 DIAGNOSIS — Z79.811 LONG TERM CURRENT USE OF AROMATASE INHIBITOR: ICD-10-CM

## 2024-12-16 PROCEDURE — 3077F SYST BP >= 140 MM HG: CPT | Mod: CPTII,S$GLB,,

## 2024-12-16 PROCEDURE — 99215 OFFICE O/P EST HI 40 MIN: CPT | Mod: S$GLB,,,

## 2024-12-16 PROCEDURE — 1160F RVW MEDS BY RX/DR IN RCRD: CPT | Mod: CPTII,S$GLB,,

## 2024-12-16 PROCEDURE — 77067 SCR MAMMO BI INCL CAD: CPT | Mod: TC,52

## 2024-12-16 PROCEDURE — 3008F BODY MASS INDEX DOCD: CPT | Mod: CPTII,S$GLB,,

## 2024-12-16 PROCEDURE — 3078F DIAST BP <80 MM HG: CPT | Mod: CPTII,S$GLB,,

## 2024-12-16 PROCEDURE — 1159F MED LIST DOCD IN RCRD: CPT | Mod: CPTII,S$GLB,,

## 2024-12-16 PROCEDURE — 1126F AMNT PAIN NOTED NONE PRSNT: CPT | Mod: CPTII,S$GLB,,

## 2024-12-16 PROCEDURE — 99999 PR PBB SHADOW E&M-EST. PATIENT-LVL III: CPT | Mod: PBBFAC,,,

## 2024-12-16 PROCEDURE — 4010F ACE/ARB THERAPY RXD/TAKEN: CPT | Mod: CPTII,S$GLB,,

## 2024-12-16 PROCEDURE — 3044F HG A1C LEVEL LT 7.0%: CPT | Mod: CPTII,S$GLB,,

## 2024-12-16 RX ORDER — ANASTROZOLE 1 MG/1
1 TABLET ORAL DAILY
Qty: 30 TABLET | Refills: 2 | Status: SHIPPED | OUTPATIENT
Start: 2024-12-16 | End: 2025-12-16

## 2024-12-16 NOTE — PROGRESS NOTES
Subjective:       Patient ID: Daisy Calderon is a 69 y.o. female.    Chief Complaint: Follow up    Diagnosis: DCIS Right Breast    Current Treatment: Plan to start Arimidex 12/2024    Treatment History:   Right Breast Mastectomy 8/5/24 - Dr. Cohen  Letrozole 2.5mg po daily 8/2024- 10/29/2024 (stopped due to side effects)    HPI:   68 yo F presents in August '24 for evaluation of right breast DCIS. In December '23 she had abnormal screening MMG with calcifications noted in the UOQ of R breast spanning 5 cm. June '24 biopsy at 10 o'clock position with DCIS, G2, ER 92%,   NC 86%. 12 o'clock position biopsy returned with benign findings. She underwent right mastectomy on 8/5/24 with Dr. Cohen, final pathology with 9mm G2 DCIS. All margins negative. 1 LN without evidence of invasive disease.     Her diagnosis, staging, and prognosis were discussed. The NCCN guidelines when discussing next steps in her treatment plan were referenced.     The mechanism, benefits, and side effect profile of Letrozole was reviewed with patient. The benefits described include reduced risk of contralateral breast cancer and reduced risk of distant metastatic disease. Side effects discussed include joint pain, hot flashes, vaginal dryness, arthralgias and loss of bone mineral density. The need for DEXA scan to monitor bone mineral density as well as supplemental calcium and vitamin D were discussed.    Interval History:   Patient here today for 6 week NP follow up appointment and labs for toxicity check after holding Letrozole.   Letrozole have been on hold for 8 weeks.  Reports her dizziness remains the same after stopping.   She has had improvement with extensive fatigue  Her PCP suspects hydration may be contributing and encouraged fluid intake.  She does report abrupt discontinuation of smoking and caffeine intake.  She denies any breast changes  Appetite stable.   Otherwise no major issues or concerns.       Past Medical History:    Diagnosis Date    Broken foot 2024    Diabetes type I     Ductal carcinoma in situ (DCIS) of right breast     HTN (hypertension)     Hypothyroidism, unspecified     Tobacco user       Past Surgical History:   Procedure Laterality Date    CARPAL TUNNEL RELEASE      CATARACT EXTRACTION       SECTION      CHOLECYSTECTOMY      SIMPLE MASTECTOMY Right 2024    Procedure: MASTECTOMY, SIMPLE  ///right, MagTrace to be Injected Intra-operatively;  Surgeon: Cindy Cohen MD;  Location: [facility] OR;  Service: General;  Laterality: Right;  MagTrace to be Injected Intra-operatively    SURGICAL REMOVAL OF LYMPH NODE Right 2024    Procedure: EXCISION, LYMPH NODE  ///right, Send fresh for flow cytometry, Send for Pathology;  Surgeon: Cindy Cohen MD;  Location: [facility] OR;  Service: General;  Laterality: Right;  Send fresh for flow cytometry  Send for Pathology    TOTAL ABDOMINAL HYSTERECTOMY      TUBAL LIGATION       Social History     Socioeconomic History    Marital status: Single   Tobacco Use    Smoking status: Former     Current packs/day: 0.25     Types: Cigarettes    Smokeless tobacco: Never   Substance and Sexual Activity    Alcohol use: Not Currently     Alcohol/week: 6.0 standard drinks of alcohol     Types: 6 Cans of beer per week     Comment: quit 4 months ago    Drug use: Never    Sexual activity: Not Currently     Social Drivers of Health     Financial Resource Strain: Low Risk  (3/4/2024)    Overall Financial Resource Strain (CARDIA)     Difficulty of Paying Living Expenses: Not hard at all   Food Insecurity: No Food Insecurity (3/4/2024)    Hunger Vital Sign     Worried About Running Out of Food in the Last Year: Never true     Ran Out of Food in the Last Year: Never true   Transportation Needs: No Transportation Needs (2024)    PRAPARE - Transportation     Lack of Transportation (Medical): No     Lack of Transportation (Non-Medical): No   Physical Activity: Sufficiently Active  (7/26/2023)    Exercise Vital Sign     Days of Exercise per Week: 5 days     Minutes of Exercise per Session: 30 min   Stress: No Stress Concern Present (7/26/2023)    [employer] - Occupational Stress Questionnaire     Feeling of Stress : Not at all   Housing Stability: Low Risk  (3/4/2024)    Housing Stability Vital Sign     Unable to Pay for Housing in the Last Year: No     Number of Places Lived in the Last Year: 1     Unstable Housing in the Last Year: No      Family History   Problem Relation Name Age of Onset    Breast cancer Sister  65      Review of patient's allergies indicates:   Allergen Reactions    Letrozole analogues Swelling and Rash     Swelling in feet/ankles, increased joint pain    Aspirin      Other Reaction(s): Unknown    Levofloxacin      Other reaction(s): itching, swelling    Oxycodone      Other Reaction(s): Unknown    Penicillin      Other Reaction(s): Unknown    Brimonidine Nausea And Vomiting      Review of Systems   Constitutional:  Positive for fatigue.   HENT: Negative.     Eyes: Negative.    Respiratory: Negative.     Cardiovascular: Negative.    Gastrointestinal: Negative.    Endocrine: Negative.    Genitourinary: Negative.    Musculoskeletal:  Positive for arthralgias.   Integumentary:  Negative.   Allergic/Immunologic: Negative.    Neurological:  Positive for dizziness.   Hematological: Negative.    Psychiatric/Behavioral:  The patient is nervous/anxious.          Objective:      Vitals:    12/16/24 1329   BP: (!) 146/69   Pulse: 92   Resp: 18   Temp: 97.9 °F (36.6 °C)       Physical Exam  HENT:      Head: Normocephalic.      Right Ear: Tympanic membrane normal.      Left Ear: Tympanic membrane normal.      Nose: Nose normal.   Eyes:      Extraocular Movements: Extraocular movements intact.      Pupils: Pupils are equal, round, and reactive to light.   Cardiovascular:      Rate and Rhythm: Normal rate and regular rhythm.      Pulses: Normal pulses.       Heart sounds: Normal heart sounds.   Pulmonary:      Effort: Pulmonary effort is normal.      Breath sounds: Normal breath sounds.   Abdominal:      General: Abdomen is flat.      Palpations: Abdomen is soft.   Genitourinary:     Comments: Exam deferred  Musculoskeletal:      Cervical back: Normal range of motion.   Skin:     General: Skin is warm and dry.      Comments: Right mastectomy incision healed well   Neurological:      Mental Status: She is alert and oriented to person, place, and time.   Psychiatric:         Mood and Affect: Mood normal.     BREAST EXAM; right breast mastectomy. Incision completely healed. Left breast tenderness; otherwise no abnormalities.     LABS AND IMAGING REVIEWED IN EPIC    Imagin2023  BL SC MG at [facility]  IMPRESSION: INCOMPLETE: NEEDS ADDITIONAL IMAGING   -In the R breast upper outer quadrant middle depth, there are indeterminate calcifications.  -No other significant masses, calcifications, or other findings are seen in either breast.       2024  R DG MG at [facility]  IMPRESSION: SUSPICIOUS OF MALIGNANCY   -Amorphous calcifications in a regional distribution spanning 5.0 cm in the upper-outer quadrant of the R breast, anterior to middle depths.      2024  BL BREAST MRI  IMPRESSION: INCOMPLETE: NEEDS ADDITIONAL IMAGING EVALUATION    1.  No suspicious enhancement in either breast.    2.  Right breast 10 o'clock middle to posterior depths metallic clip and surrounding post biopsy changes related to previous stereotactic biopsy of 2024, with pathology revealing ductal carcinoma in-situ.    3.  No suspicious enhancement at the site of recent benign stereotactic biopsy of calcifications at 12 o'clock right breast anterior depth.  4.  Several prominent level 1 lymph nodes within bilateral axillae with cortical thickening and partial hilar effacement, slightly more prominent in the right axilla compared to the left axilla, are of uncertain clinical significance.         Pathology:  6/17/2024  Stereotactic guided Core Needle Biopsy Right Breast 10 o'clock Middle Depth Calcifications  -Ductal carcinoma in situ, grade 2 with solid, cribriform and micropapillary architecture, focal comedo necrosis and multifocal microcalcifications  -Background nonproliferative fibrocystic changes with sclerosing adenosis and focal minute sclerosing intraductal papilloma  ER 92%  MN 86%     6/17/2024  Stereotactic guided Core Needle Biopsy Right Breast 12 o'clock Anterior Depth Calcifications  -Nonproliferative fibrocystic changes with extensive sclerosing adenosis and microcalcifications  -No evidence of significant epithelial hyperplasia atypia or malignancy identified     8/5/2024  Right Mastectomy- Ductal carcinoma in situ, grade 2,  solid and cribriform, with focal necrosis and calcification (measuring 9 mm); focal sclerosing adenosis; focal fibroadenomatoid change; fibrocystic change.  All margins clear.  One lymph node with no evidence of neoplasm.      Assessment:   Right Breast DCIS - ER+/MN+. S/p mastectomy August '24. She transitioned into AI x 5 years in adjuvant setting; however experienced extensive fatigue and dizziness. Letrozole have since been on hold. Fatigue have improved, but dizziness remains. Her PCP suspects hydration and hypotension is contributing. Plan to switch to Arimidex for AI therapy given Letrozole side effects.     Dexa scan 7/27/26  Plan:   - Toxicity reviewed; okay to start Arimidex 1mg po daily; patient wishes to switch AI given extensive fatigue and dizziness with Letrozole; Rx sent today   -  Continue Reclast per PCP  - MMG today  - RTC 6 weeks for NP visit, labs same day          I spent a total of 40 minutes on the day of the visit.This includes face to face time and non-face to face time preparing to see the patient (eg, review of tests), obtaining and/or reviewing separately obtained history, documenting clinical information in the electronic or other health  record, independently interpreting results and communicating results to the patient/family/caregiver, or care coordinator.          KENNY Red-MATTHEW  Hematology/Oncology   [facility]

## 2024-12-17 ENCOUNTER — INFUSION (OUTPATIENT)
Dept: INFUSION THERAPY | Facility: HOSPITAL | Age: 69
End: 2024-12-17
Attending: INTERNAL MEDICINE
Payer: MEDICARE

## 2024-12-17 VITALS
DIASTOLIC BLOOD PRESSURE: 78 MMHG | HEART RATE: 92 BPM | RESPIRATION RATE: 16 BRPM | BODY MASS INDEX: 25.85 KG/M2 | HEIGHT: 60 IN | SYSTOLIC BLOOD PRESSURE: 144 MMHG | OXYGEN SATURATION: 98 % | WEIGHT: 131.69 LBS | TEMPERATURE: 98 F

## 2024-12-17 DIAGNOSIS — M81.0 OSTEOPOROSIS, UNSPECIFIED OSTEOPOROSIS TYPE, UNSPECIFIED PATHOLOGICAL FRACTURE PRESENCE: Primary | ICD-10-CM

## 2024-12-17 PROCEDURE — 96372 THER/PROPH/DIAG INJ SC/IM: CPT

## 2024-12-17 PROCEDURE — 63600175 PHARM REV CODE 636 W HCPCS: Mod: JZ,JG

## 2024-12-17 RX ADMIN — ROMOSOZUMAB-AQQG 210 MG: 105 INJECTION, SOLUTION SUBCUTANEOUS at 01:12

## 2025-01-14 ENCOUNTER — INFUSION (OUTPATIENT)
Dept: INFUSION THERAPY | Facility: HOSPITAL | Age: 70
End: 2025-01-14
Attending: INTERNAL MEDICINE
Payer: MEDICARE

## 2025-01-14 VITALS
DIASTOLIC BLOOD PRESSURE: 69 MMHG | BODY MASS INDEX: 23.33 KG/M2 | HEART RATE: 103 BPM | SYSTOLIC BLOOD PRESSURE: 145 MMHG | WEIGHT: 131.69 LBS | TEMPERATURE: 98 F | RESPIRATION RATE: 18 BRPM | HEIGHT: 63 IN

## 2025-01-14 DIAGNOSIS — M81.0 OSTEOPOROSIS, UNSPECIFIED OSTEOPOROSIS TYPE, UNSPECIFIED PATHOLOGICAL FRACTURE PRESENCE: Primary | ICD-10-CM

## 2025-01-14 PROCEDURE — 63600175 PHARM REV CODE 636 W HCPCS: Mod: JZ,TB

## 2025-01-14 PROCEDURE — 96372 THER/PROPH/DIAG INJ SC/IM: CPT

## 2025-01-14 RX ADMIN — ROMOSOZUMAB-AQQG 210 MG: 105 INJECTION, SOLUTION SUBCUTANEOUS at 01:01

## 2025-01-27 ENCOUNTER — TELEPHONE (OUTPATIENT)
Dept: INTERNAL MEDICINE | Facility: CLINIC | Age: 70
End: 2025-01-27
Payer: MEDICARE

## 2025-01-27 NOTE — TELEPHONE ENCOUNTER
----- Message from Med Assistant Winston sent at 1/27/2025  7:56 AM CST -----  Regarding: PV Monday 2-3-25       1. Are there any outstanding Labs, imaging or referrals in the patient's chart?      6 month     Fasting labs ordered and ready to do    Last wellness 7-29-24         2. . Has the patient been seen in an ER, urgent care clinic, or any other health care    provider since their last visit? If yes when and where?

## 2025-01-28 ENCOUNTER — LAB VISIT (OUTPATIENT)
Dept: LAB | Facility: HOSPITAL | Age: 70
End: 2025-01-28
Attending: STUDENT IN AN ORGANIZED HEALTH CARE EDUCATION/TRAINING PROGRAM
Payer: MEDICARE

## 2025-01-28 ENCOUNTER — OFFICE VISIT (OUTPATIENT)
Dept: HEMATOLOGY/ONCOLOGY | Facility: CLINIC | Age: 70
End: 2025-01-28
Payer: MEDICARE

## 2025-01-28 VITALS
SYSTOLIC BLOOD PRESSURE: 157 MMHG | DIASTOLIC BLOOD PRESSURE: 81 MMHG | HEIGHT: 63 IN | WEIGHT: 134.88 LBS | BODY MASS INDEX: 23.9 KG/M2 | TEMPERATURE: 99 F | RESPIRATION RATE: 18 BRPM | HEART RATE: 95 BPM | OXYGEN SATURATION: 99 %

## 2025-01-28 DIAGNOSIS — D05.11 DUCTAL CARCINOMA IN SITU (DCIS) OF RIGHT BREAST: Primary | ICD-10-CM

## 2025-01-28 DIAGNOSIS — D05.11 DUCTAL CARCINOMA IN SITU (DCIS) OF RIGHT BREAST: ICD-10-CM

## 2025-01-28 LAB
ALBUMIN SERPL-MCNC: 3.9 G/DL (ref 3.4–4.8)
ALBUMIN/GLOB SERPL: 1.2 RATIO (ref 1.1–2)
ALP SERPL-CCNC: 90 UNIT/L (ref 40–150)
ALT SERPL-CCNC: 14 UNIT/L (ref 0–55)
ANION GAP SERPL CALC-SCNC: 9 MEQ/L
AST SERPL-CCNC: 16 UNIT/L (ref 5–34)
BASOPHILS # BLD AUTO: 0.01 X10(3)/MCL
BASOPHILS NFR BLD AUTO: 0.2 %
BILIRUB SERPL-MCNC: 0.4 MG/DL
BUN SERPL-MCNC: 19.3 MG/DL (ref 9.8–20.1)
CALCIUM SERPL-MCNC: 9.2 MG/DL (ref 8.4–10.2)
CHLORIDE SERPL-SCNC: 97 MMOL/L (ref 98–107)
CO2 SERPL-SCNC: 25 MMOL/L (ref 23–31)
CREAT SERPL-MCNC: 0.82 MG/DL (ref 0.55–1.02)
CREAT/UREA NIT SERPL: 24
EOSINOPHIL # BLD AUTO: 0.89 X10(3)/MCL (ref 0–0.9)
EOSINOPHIL NFR BLD AUTO: 13.9 %
ERYTHROCYTE [DISTWIDTH] IN BLOOD BY AUTOMATED COUNT: 12.2 % (ref 11.5–17)
GFR SERPLBLD CREATININE-BSD FMLA CKD-EPI: >60 ML/MIN/1.73/M2
GLOBULIN SER-MCNC: 3.3 GM/DL (ref 2.4–3.5)
GLUCOSE SERPL-MCNC: 230 MG/DL (ref 82–115)
HCT VFR BLD AUTO: 38 % (ref 37–47)
HGB BLD-MCNC: 13 G/DL (ref 12–16)
IMM GRANULOCYTES # BLD AUTO: 0.01 X10(3)/MCL (ref 0–0.04)
IMM GRANULOCYTES NFR BLD AUTO: 0.2 %
LYMPHOCYTES # BLD AUTO: 2.11 X10(3)/MCL (ref 0.6–4.6)
LYMPHOCYTES NFR BLD AUTO: 33 %
MCH RBC QN AUTO: 31.6 PG (ref 27–31)
MCHC RBC AUTO-ENTMCNC: 34.2 G/DL (ref 33–36)
MCV RBC AUTO: 92.2 FL (ref 80–94)
MONOCYTES # BLD AUTO: 0.34 X10(3)/MCL (ref 0.1–1.3)
MONOCYTES NFR BLD AUTO: 5.3 %
NEUTROPHILS # BLD AUTO: 3.03 X10(3)/MCL (ref 2.1–9.2)
NEUTROPHILS NFR BLD AUTO: 47.4 %
PLATELET # BLD AUTO: 247 X10(3)/MCL (ref 130–400)
PMV BLD AUTO: 9.6 FL (ref 7.4–10.4)
POTASSIUM SERPL-SCNC: 4 MMOL/L (ref 3.5–5.1)
PROT SERPL-MCNC: 7.2 GM/DL (ref 5.8–7.6)
RBC # BLD AUTO: 4.12 X10(6)/MCL (ref 4.2–5.4)
SODIUM SERPL-SCNC: 131 MMOL/L (ref 136–145)
WBC # BLD AUTO: 6.39 X10(3)/MCL (ref 4.5–11.5)

## 2025-01-28 PROCEDURE — 36415 COLL VENOUS BLD VENIPUNCTURE: CPT

## 2025-01-28 PROCEDURE — G2211 COMPLEX E/M VISIT ADD ON: HCPCS | Mod: S$GLB,,,

## 2025-01-28 PROCEDURE — 99999 PR PBB SHADOW E&M-EST. PATIENT-LVL III: CPT | Mod: PBBFAC,,,

## 2025-01-28 PROCEDURE — 80053 COMPREHEN METABOLIC PANEL: CPT

## 2025-01-28 PROCEDURE — 1126F AMNT PAIN NOTED NONE PRSNT: CPT | Mod: CPTII,S$GLB,,

## 2025-01-28 PROCEDURE — 3079F DIAST BP 80-89 MM HG: CPT | Mod: CPTII,S$GLB,,

## 2025-01-28 PROCEDURE — 1160F RVW MEDS BY RX/DR IN RCRD: CPT | Mod: CPTII,S$GLB,,

## 2025-01-28 PROCEDURE — 1159F MED LIST DOCD IN RCRD: CPT | Mod: CPTII,S$GLB,,

## 2025-01-28 PROCEDURE — 3077F SYST BP >= 140 MM HG: CPT | Mod: CPTII,S$GLB,,

## 2025-01-28 PROCEDURE — 99215 OFFICE O/P EST HI 40 MIN: CPT | Mod: S$GLB,,,

## 2025-01-28 PROCEDURE — 3008F BODY MASS INDEX DOCD: CPT | Mod: CPTII,S$GLB,,

## 2025-01-28 PROCEDURE — 85025 COMPLETE CBC W/AUTO DIFF WBC: CPT

## 2025-01-28 RX ORDER — ANASTROZOLE 1 MG/1
1 TABLET ORAL DAILY
Qty: 90 TABLET | Refills: 0 | Status: SHIPPED | OUTPATIENT
Start: 2025-01-28 | End: 2026-01-28

## 2025-01-28 NOTE — PROGRESS NOTES
Subjective:       Patient ID: Daisy Calderon is a 69 y.o. female.    Chief Complaint: Follow up    Diagnosis: DCIS Right Breast    Current Treatment:  Arimidex 12/2024- present    Treatment History:   Right Breast Mastectomy 8/5/24 - Dr. Cohen  Letrozole 2.5mg po daily 8/2024- 10/29/2024 (stopped due to side effects)    HPI:   68 yo F presents in August '24 for evaluation of right breast DCIS. In December '23 she had abnormal screening MMG with calcifications noted in the UOQ of R breast spanning 5 cm. June '24 biopsy at 10 o'clock position with DCIS, G2, ER 92%,   DC 86%. 12 o'clock position biopsy returned with benign findings. She underwent right mastectomy on 8/5/24 with Dr. Cohen, final pathology with 9mm G2 DCIS. All margins negative. 1 LN without evidence of invasive disease.     Her diagnosis, staging, and prognosis were discussed. The NCCN guidelines when discussing next steps in her treatment plan were referenced. She was started on Letrozole for AI therapy in the adjuvant setting. Due to side effects Letrozole was discontinued and she was started on Arimidex and is tolerating well.     The mechanism, benefits, and side effect profile of Arimidex was reviewed with patient. The benefits described include reduced risk of contralateral breast cancer and reduced risk of distant metastatic disease. Side effects discussed include joint pain, hot flashes, vaginal dryness, arthralgias and loss of bone mineral density. The need for DEXA scan to monitor bone mineral density as well as supplemental calcium and vitamin D were discussed.     Interval History:   Patient here today for 6 week NP follow up appointment and labs for toxicity check while taking Arimidex.   Overall she states she feels much improved  Although she still has fatigue, hot flashes and arthralgias it is much better  She tries to remain active given her arthralgias.   She denies any breast changes  Appetite and energy levels are  stable.  Reports her blood sugars are better controlled.    Otherwise no major issues or concerns.       Past Medical History:   Diagnosis Date    Broken foot 2024    Diabetes type I     Ductal carcinoma in situ (DCIS) of right breast     HTN (hypertension)     Hypothyroidism, unspecified     Tobacco user       Past Surgical History:   Procedure Laterality Date    CARPAL TUNNEL RELEASE      CATARACT EXTRACTION       SECTION      CHOLECYSTECTOMY      SIMPLE MASTECTOMY Right 2024    Procedure: MASTECTOMY, SIMPLE  ///right, MagTrace to be Injected Intra-operatively;  Surgeon: Cindy Cohen MD;  Location: [facility];  Service: General;  Laterality: Right;  MagTrace to be Injected Intra-operatively    SURGICAL REMOVAL OF LYMPH NODE Right 2024    Procedure: EXCISION, LYMPH NODE  ///right, Send fresh for flow cytometry, Send for Pathology;  Surgeon: Cindy Cohen MD;  Location: [facility];  Service: General;  Laterality: Right;  Send fresh for flow cytometry  Send for Pathology    TOTAL ABDOMINAL HYSTERECTOMY      TUBAL LIGATION       Social History     Socioeconomic History    Marital status: Single   Tobacco Use    Smoking status: Former     Current packs/day: 0.25     Types: Cigarettes    Smokeless tobacco: Never   Substance and Sexual Activity    Alcohol use: Not Currently     Alcohol/week: 6.0 standard drinks of alcohol     Types: 6 Cans of beer per week     Comment: quit 4 months ago    Drug use: Never    Sexual activity: Not Currently     Social Drivers of Health     Financial Resource Strain: Low Risk  (3/4/2024)    Overall Financial Resource Strain (CARDIA)     Difficulty of Paying Living Expenses: Not hard at all   Food Insecurity: No Food Insecurity (3/4/2024)    Hunger Vital Sign     Worried About Running Out of Food in the Last Year: Never true     Ran Out of Food in the Last Year: Never true   Transportation Needs: No Transportation Needs (2024)    PRAPARE - Transportation     Lack  of Transportation (Medical): No     Lack of Transportation (Non-Medical): No   Physical Activity: Sufficiently Active (7/26/2023)    Exercise Vital Sign     Days of Exercise per Week: 5 days     Minutes of Exercise per Session: 30 min   Stress: No Stress Concern Present (7/26/2023)    [employer] - Occupational Stress Questionnaire     Feeling of Stress : Not at all   Housing Stability: Low Risk  (3/4/2024)    Housing Stability Vital Sign     Unable to Pay for Housing in the Last Year: No     Number of Places Lived in the Last Year: 1     Unstable Housing in the Last Year: No      Family History   Problem Relation Name Age of Onset    Breast cancer Sister  65      Review of patient's allergies indicates:   Allergen Reactions    Aspirin      Other Reaction(s): Unknown    Levofloxacin      Other reaction(s): itching, swelling    Oxycodone      Other Reaction(s): Unknown    Penicillin      Other Reaction(s): Unknown    Brimonidine Nausea And Vomiting      Review of Systems   Constitutional:  Positive for fatigue.   HENT: Negative.     Eyes: Negative.    Respiratory: Negative.     Cardiovascular: Negative.    Gastrointestinal: Negative.    Endocrine: Negative.    Genitourinary: Negative.    Musculoskeletal:  Positive for arthralgias.   Integumentary:  Negative.   Allergic/Immunologic: Negative.    Hematological: Negative.    Psychiatric/Behavioral:  The patient is nervous/anxious.          Objective:      Vitals:    01/28/25 1302   BP: (!) 157/81   Pulse: 95   Resp: 18   Temp: 98.5 °F (36.9 °C)     Physical Exam  HENT:      Head: Normocephalic.      Right Ear: Tympanic membrane normal.      Left Ear: Tympanic membrane normal.      Nose: Nose normal.   Eyes:      Extraocular Movements: Extraocular movements intact.      Pupils: Pupils are equal, round, and reactive to light.   Cardiovascular:      Rate and Rhythm: Normal rate and regular rhythm.      Pulses: Normal pulses.      Heart sounds:  Normal heart sounds.   Pulmonary:      Effort: Pulmonary effort is normal.      Breath sounds: Normal breath sounds.   Abdominal:      General: Abdomen is flat.      Palpations: Abdomen is soft.   Genitourinary:     Comments: Exam deferred  Musculoskeletal:      Cervical back: Normal range of motion.   Skin:     General: Skin is warm and dry.      Comments: Right mastectomy incision healed well   Neurological:      Mental Status: She is alert and oriented to person, place, and time.   Psychiatric:         Mood and Affect: Mood normal.   BREAST EXAM; right breast mastectomy. Incision completely healed. Left breast tenderness; otherwise no abnormalities.     LABS AND IMAGING REVIEWED IN EPIC    Imagin2023  BL SC MG at [facility]  IMPRESSION: INCOMPLETE: NEEDS ADDITIONAL IMAGING   -In the R breast upper outer quadrant middle depth, there are indeterminate calcifications.  -No other significant masses, calcifications, or other findings are seen in either breast.       2024  R DG MG at [facility]  IMPRESSION: SUSPICIOUS OF MALIGNANCY   -Amorphous calcifications in a regional distribution spanning 5.0 cm in the upper-outer quadrant of the R breast, anterior to middle depths.      2024  BL BREAST MRI  IMPRESSION: INCOMPLETE: NEEDS ADDITIONAL IMAGING EVALUATION    1.  No suspicious enhancement in either breast.    2.  Right breast 10 o'clock middle to posterior depths metallic clip and surrounding post biopsy changes related to previous stereotactic biopsy of 2024, with pathology revealing ductal carcinoma in-situ.    3.  No suspicious enhancement at the site of recent benign stereotactic biopsy of calcifications at 12 o'clock right breast anterior depth.  4.  Several prominent level 1 lymph nodes within bilateral axillae with cortical thickening and partial hilar effacement, slightly more prominent in the right axilla compared to the left axilla, are of uncertain clinical significance.     MMG  12/17/2024  IMPRESSION: BENIGN  No mammographic evidence of malignancy in the left breast.     There is no mammographic evidence of malignancy.      Pathology:  6/17/2024  Stereotactic guided Core Needle Biopsy Right Breast 10 o'clock Middle Depth Calcifications  -Ductal carcinoma in situ, grade 2 with solid, cribriform and micropapillary architecture, focal comedo necrosis and multifocal microcalcifications  -Background nonproliferative fibrocystic changes with sclerosing adenosis and focal minute sclerosing intraductal papilloma  ER 92%  MO 86%     6/17/2024  Stereotactic guided Core Needle Biopsy Right Breast 12 o'clock Anterior Depth Calcifications  -Nonproliferative fibrocystic changes with extensive sclerosing adenosis and microcalcifications  -No evidence of significant epithelial hyperplasia atypia or malignancy identified     8/5/2024  Right Mastectomy- Ductal carcinoma in situ, grade 2,  solid and cribriform, with focal necrosis and calcification (measuring 9 mm); focal sclerosing adenosis; focal fibroadenomatoid change; fibrocystic change.  All margins clear.  One lymph node with no evidence of neoplasm.      Assessment:   Right Breast DCIS - ER+/MO+. S/p mastectomy August '24. She transitioned into AI x 5 years in adjuvant setting; however experienced extensive fatigue and dizziness. Letrozole have been discontinued. Fatigue have improve. She have switched Arimidex for AI therapy  and tolerating well. Will continue for now.     Dexa scan 7/27/26  MMG due 12/2025  Plan:   - Toxicity reviewed; Okay to continue Arimidex 1mg po daily; refill sent today  - RTC 3 months for NP visit, labs same day      I spent a total of 40 minutes on the day of the visit.This includes face to face time and non-face to face time preparing to see the patient (eg, review of tests), obtaining and/or reviewing separately obtained history, documenting clinical information in the electronic or other health record, independently  interpreting results and communicating results to the patient/family/caregiver, or care coordinator.      Visit today included increased complexity associated with the care of the episodic problem DCIS Right Breast, addressing and managing the longitudinal care of the patient's DCIS Right Breast.    ALIVIA Red  Hematology/Oncology   [facility]

## 2025-02-03 ENCOUNTER — OFFICE VISIT (OUTPATIENT)
Dept: INTERNAL MEDICINE | Facility: CLINIC | Age: 70
End: 2025-02-03
Payer: MEDICARE

## 2025-02-03 VITALS
SYSTOLIC BLOOD PRESSURE: 124 MMHG | OXYGEN SATURATION: 96 % | DIASTOLIC BLOOD PRESSURE: 62 MMHG | BODY MASS INDEX: 24.29 KG/M2 | HEIGHT: 62 IN | WEIGHT: 132 LBS | HEART RATE: 95 BPM

## 2025-02-03 DIAGNOSIS — D05.11 DUCTAL CARCINOMA IN SITU (DCIS) OF RIGHT BREAST: Primary | ICD-10-CM

## 2025-02-03 DIAGNOSIS — E10.3593 TYPE 1 DIABETES MELLITUS WITH PROLIFERATIVE DIABETIC RETINOPATHY WITHOUT MACULAR EDEMA, BILATERAL: ICD-10-CM

## 2025-02-03 DIAGNOSIS — E03.9 ACQUIRED HYPOTHYROIDISM: ICD-10-CM

## 2025-02-03 PROCEDURE — 1125F AMNT PAIN NOTED PAIN PRSNT: CPT | Mod: CPTII,,, | Performed by: INTERNAL MEDICINE

## 2025-02-03 PROCEDURE — 1101F PT FALLS ASSESS-DOCD LE1/YR: CPT | Mod: CPTII,,, | Performed by: INTERNAL MEDICINE

## 2025-02-03 PROCEDURE — 3288F FALL RISK ASSESSMENT DOCD: CPT | Mod: CPTII,,, | Performed by: INTERNAL MEDICINE

## 2025-02-03 PROCEDURE — 1160F RVW MEDS BY RX/DR IN RCRD: CPT | Mod: CPTII,,, | Performed by: INTERNAL MEDICINE

## 2025-02-03 PROCEDURE — 3078F DIAST BP <80 MM HG: CPT | Mod: CPTII,,, | Performed by: INTERNAL MEDICINE

## 2025-02-03 PROCEDURE — 99214 OFFICE O/P EST MOD 30 MIN: CPT | Mod: ,,, | Performed by: INTERNAL MEDICINE

## 2025-02-03 PROCEDURE — 3074F SYST BP LT 130 MM HG: CPT | Mod: CPTII,,, | Performed by: INTERNAL MEDICINE

## 2025-02-03 PROCEDURE — 1159F MED LIST DOCD IN RCRD: CPT | Mod: CPTII,,, | Performed by: INTERNAL MEDICINE

## 2025-02-03 PROCEDURE — 3008F BODY MASS INDEX DOCD: CPT | Mod: CPTII,,, | Performed by: INTERNAL MEDICINE

## 2025-02-03 RX ORDER — ROSUVASTATIN CALCIUM 20 MG/1
20 TABLET, COATED ORAL DAILY
Qty: 90 TABLET | Refills: 3 | Status: SHIPPED | OUTPATIENT
Start: 2025-02-03 | End: 2026-02-03

## 2025-02-03 RX ORDER — MULTIVITAMIN
1 TABLET ORAL DAILY
COMMUNITY

## 2025-02-03 NOTE — ASSESSMENT & PLAN NOTE
-doing much better now, also healing well postoperatively.    -now on anastrozole and seems to be doing so much better from the joint pain standpoint.

## 2025-02-03 NOTE — PROGRESS NOTES
Subjective:      Patient ID: Daisy Calderon is a 69 y.o. female.    Chief Complaint: Follow-up (6 month diabetes/thyroid/)     is a 69-year-old female who is here today for her follow-up visit.  Comorbidities include diabetes mellitus type 1, hypothyroidism and diagnosis of DCIS status post right mastectomy with axillary lymph node excision and intolerance to letrozole , now on anastrozole and seems to be doing better.  No acute needs or complaints as such today.    Has been utilizing compression socks which seems to be helping with the bilateral lower extremity edema.    Educated patient on importance of getting started on a statin with a type 1 diabetes and the cardioprotective and cerebrovascular protective properties and she is willing to try.    The patient's Health Maintenance was reviewed and the following appears to be due at this time:   Health Maintenance Due   Topic Date Due    Hepatitis C Screening  Never done    Diabetes Urine Screening  Never done    Low Dose Statin  Never done    Shingles Vaccine (1 of 2) Never done    TETANUS VACCINE  10/25/2015    RSV Vaccine (Age 60+ and Pregnant patients) (1 - Risk 60-74 years 1-dose series) Never done    Foot Exam  2024    Lipid Panel  2025        Past Medical History:  Past Medical History:   Diagnosis Date    Broken foot 2024    Diabetes type I     Ductal carcinoma in situ (DCIS) of right breast     HTN (hypertension)     Hypothyroidism, unspecified     Tobacco user      Past Surgical History:   Procedure Laterality Date    BREAST SURGERY  2024    CARPAL TUNNEL RELEASE      CATARACT EXTRACTION       SECTION      CHOLECYSTECTOMY      COLON SURGERY  Polip removal    EYE SURGERY      Laser for bleeding over 5 years ago, cataracts removed from both eyes    SIMPLE MASTECTOMY Right 2024    Procedure: MASTECTOMY, SIMPLE  ///right, MagTrace to be Injected Intra-operatively;  Surgeon: Cindy Cohen MD;  Location: [facility]  OR;  Service: General;  Laterality: Right;  MagTrace to be Injected Intra-operatively    SURGICAL REMOVAL OF LYMPH NODE Right 08/05/2024    Procedure: EXCISION, LYMPH NODE  ///right, Send fresh for flow cytometry, Send for Pathology;  Surgeon: Cindy Cohen MD;  Location: [facility] OR;  Service: General;  Laterality: Right;  Send fresh for flow cytometry  Send for Pathology    TOTAL ABDOMINAL HYSTERECTOMY      TUBAL LIGATION       Review of patient's allergies indicates:   Allergen Reactions    Aspirin      Other Reaction(s): Unknown    Levofloxacin      Other reaction(s): itching, swelling    Oxycodone      Other Reaction(s): Unknown    Penicillin      Other Reaction(s): Unknown    Brimonidine Nausea And Vomiting     Social History     Socioeconomic History    Marital status: Single   Tobacco Use    Smoking status: Former     Current packs/day: 0.25     Types: Cigarettes    Smokeless tobacco: Never   Substance and Sexual Activity    Alcohol use: Not Currently     Alcohol/week: 6.0 standard drinks of alcohol     Comment: quit 4 months ago    Drug use: Never    Sexual activity: Not Currently     Partners: Male     Social Drivers of Health     Financial Resource Strain: Low Risk  (2/3/2025)    Overall Financial Resource Strain (CARDIA)     Difficulty of Paying Living Expenses: Not very hard   Food Insecurity: No Food Insecurity (2/3/2025)    Hunger Vital Sign     Worried About Running Out of Food in the Last Year: Never true     Ran Out of Food in the Last Year: Never true   Transportation Needs: No Transportation Needs (4/8/2024)    PRAPARE - Transportation     Lack of Transportation (Medical): No     Lack of Transportation (Non-Medical): No   Physical Activity: Unknown (2/3/2025)    Exercise Vital Sign     Days of Exercise per Week: 0 days   Stress: No Stress Concern Present (2/3/2025)    [employer] - Occupational Stress Questionnaire     Feeling of Stress : Not at all   Housing  "Stability: Low Risk  (3/4/2024)    Housing Stability Vital Sign     Unable to Pay for Housing in the Last Year: No     Number of Places Lived in the Last Year: 1     Unstable Housing in the Last Year: No     Family History   Problem Relation Name Age of Onset    Breast cancer Sister Mari Mccarty 65    Arthritis Sister Mari Mccarty        Review of Systems    A comprehensive review of systems was performed and is negative except for that stated above  Objective:   /62 (BP Location: Left arm, Patient Position: Sitting)   Pulse 95   Ht 5' 2" (1.575 m)   Wt 59.9 kg (132 lb)   SpO2 96%   BMI 24.14 kg/m²     Physical Exam  Constitutional:       Appearance: Normal appearance.   HENT:      Head: Normocephalic and atraumatic.      Nose: Nose normal.      Mouth/Throat:      Mouth: Mucous membranes are moist.      Pharynx: Oropharynx is clear.   Eyes:      Extraocular Movements: Extraocular movements intact.      Pupils: Pupils are equal, round, and reactive to light.   Cardiovascular:      Rate and Rhythm: Normal rate and regular rhythm.      Pulses: Normal pulses.   Pulmonary:      Effort: Pulmonary effort is normal.      Breath sounds: Normal breath sounds.   Abdominal:      General: Bowel sounds are normal.      Palpations: Abdomen is soft.   Musculoskeletal:         General: Normal range of motion.      Cervical back: Normal range of motion and neck supple.   Skin:     General: Skin is warm.   Neurological:      General: No focal deficit present.      Mental Status: She is alert and oriented to person, place, and time. Mental status is at baseline.   Psychiatric:         Mood and Affect: Mood normal.       Assessment/ Plan:   1. Ductal carcinoma in situ (DCIS) of right breast  Assessment & Plan:  -doing much better now, also healing well postoperatively.    -now on anastrozole and seems to be doing so much better from the joint pain standpoint.          2. Type 1 diabetes mellitus with proliferative diabetic " retinopathy without macular edema, bilateral  Assessment & Plan:  -doing well with the insulin pump, no acute issues   -followed by Endocrinology      3. Acquired hypothyroidism  Assessment & Plan:     Continue levothyroxine 125 mcg p.o. daily  Take medicine on an empty stomach with water (no other medications or beverages). Wait 30 minutes to eat or drink.  Report any symptoms of thinning hair, breaking nails, fatigue, weight gain or loss, palpitations.         Other orders  -     rosuvastatin (CRESTOR) 20 MG tablet; Take 1 tablet (20 mg total) by mouth once daily.  Dispense: 90 tablet; Refill: 3

## 2025-02-11 ENCOUNTER — INFUSION (OUTPATIENT)
Dept: INFUSION THERAPY | Facility: HOSPITAL | Age: 70
End: 2025-02-11
Attending: INTERNAL MEDICINE
Payer: MEDICARE

## 2025-02-11 VITALS
TEMPERATURE: 98 F | HEART RATE: 94 BPM | RESPIRATION RATE: 18 BRPM | OXYGEN SATURATION: 96 % | HEIGHT: 62 IN | DIASTOLIC BLOOD PRESSURE: 67 MMHG | BODY MASS INDEX: 24.26 KG/M2 | SYSTOLIC BLOOD PRESSURE: 121 MMHG | WEIGHT: 131.81 LBS

## 2025-02-11 DIAGNOSIS — M81.0 OSTEOPOROSIS, UNSPECIFIED OSTEOPOROSIS TYPE, UNSPECIFIED PATHOLOGICAL FRACTURE PRESENCE: Primary | ICD-10-CM

## 2025-02-11 PROCEDURE — 96372 THER/PROPH/DIAG INJ SC/IM: CPT

## 2025-02-11 PROCEDURE — 63600175 PHARM REV CODE 636 W HCPCS: Mod: JZ,TB

## 2025-02-11 RX ADMIN — ROMOSOZUMAB-AQQG 210 MG: 105 INJECTION, SOLUTION SUBCUTANEOUS at 01:02

## 2025-02-11 NOTE — NURSING
Evenity injection given, tolerated well. Pt denied any recent dental work within the past 3 months as well as any upcoming invasive dental procedures. Pt discharged in stable condition, next appt given.

## 2025-03-11 ENCOUNTER — INFUSION (OUTPATIENT)
Dept: INFUSION THERAPY | Facility: HOSPITAL | Age: 70
End: 2025-03-11
Attending: INTERNAL MEDICINE
Payer: MEDICARE

## 2025-03-11 VITALS
OXYGEN SATURATION: 96 % | HEART RATE: 87 BPM | RESPIRATION RATE: 18 BRPM | TEMPERATURE: 98 F | SYSTOLIC BLOOD PRESSURE: 145 MMHG | DIASTOLIC BLOOD PRESSURE: 64 MMHG

## 2025-03-11 DIAGNOSIS — M81.0 OSTEOPOROSIS, UNSPECIFIED OSTEOPOROSIS TYPE, UNSPECIFIED PATHOLOGICAL FRACTURE PRESENCE: Primary | ICD-10-CM

## 2025-03-11 PROCEDURE — 63600175 PHARM REV CODE 636 W HCPCS: Mod: JZ,TB

## 2025-03-11 PROCEDURE — 96372 THER/PROPH/DIAG INJ SC/IM: CPT

## 2025-03-11 RX ADMIN — ROMOSOZUMAB-AQQG 210 MG: 105 INJECTION, SOLUTION SUBCUTANEOUS at 02:03

## 2025-03-25 ENCOUNTER — TELEPHONE (OUTPATIENT)
Dept: INTERNAL MEDICINE | Facility: CLINIC | Age: 70
End: 2025-03-25
Payer: MEDICARE

## 2025-03-25 NOTE — TELEPHONE ENCOUNTER
----- Message from Macey Mcnamara sent at 3/25/2025  1:35 PM CDT -----  Who Called: Daisy CalderonPatient is returning phone callWho Left Message for Patient:Devorah the patient know what this is regarding?:symptoms Preferred Method of Contact: Phone CallPatient's Preferred Phone Number on File: [phone] Best Call Back Number, if different:Additional Information: pt stated she just missed call

## 2025-03-25 NOTE — TELEPHONE ENCOUNTER
Copied from CRM #[id number]. Topic: General Inquiry - Patient Advice  >> Mar 25, 2025  8:38 AM Stormy wrote:  .Type:  Needs Medical Advice    Who Called: PT  Symptoms (please be specific): Dropped a concrete garden round on left foot and it really red and infected   How long has patient had these symptoms:  7/8 days   Pharmacy name and phone #:  [facility] on [address] and [address]  Would the patient rather a call back or a response via [facility]?   Best Call Back Number: [phone]  Additional Information: Please call in some antibiotics

## 2025-03-25 NOTE — TELEPHONE ENCOUNTER
----- Message from Rickie sent at 3/25/2025  1:13 PM CDT -----  .Who Called: Daisy CalderonPatient is returning phone callWho Left Message for Patient: Devorah the patient know what this is regarding?: Medication (Antibiotic)Preferred Method of Contact: Phone CallPatient's Preferred Phone Number on File: [phone] Best Call Back Number, if different:Additional Information: Returning missed call. Please advise, thank you.

## 2025-03-26 ENCOUNTER — TELEPHONE (OUTPATIENT)
Dept: INTERNAL MEDICINE | Facility: CLINIC | Age: 70
End: 2025-03-26
Payer: MEDICARE

## 2025-03-26 NOTE — TELEPHONE ENCOUNTER
----- Message from Ladan sent at 3/26/2025 12:37 PM CDT -----  Who Called: Daisy Guadarrama is requesting assistance/information from provider's office.Symptoms (please be specific): L foot swollen How long has patient had these symptoms:  a week and a halfList of preferred pharmacies on file (remove unneeded): [facility]/pharmacy #[id number] - [place], [place] - [address] [facility] [address]Phone: [phone]: [phone] Method of Contact: Phone CallPatient's Preferred Phone Number on File: [phone] Best Call Back Number, if different: n/aAdditional Information: dropped concrete garden surround on top of L foot almost 2 weeks ago has a gash, red, swollen, feels hot to the touch; sugar is elevated and generally not feeling well.Pt asked that an antibiotic would be sent to the pharmacy.

## 2025-03-26 NOTE — TELEPHONE ENCOUNTER
"Patient scheduled.  Patient was upset on the phone stating that she has been trying to contact our office for 3 days. I apologized for the inconvenience and advised her that she never spoke to me and if she had spoke to me 3 days ago we would not be in this position. I checked her chart to see who she has been talking to, Marcelina has been trying to contact the patient but she has no vm set up. I advised the patient that we have been trying to contact her but we could not get in touch with her and her vm is not set up so we couldn't leave messages. Patient states "I have work being done in my house, I cannot wait by the phone all day for a call." I stated that I understand, I was just informing her that we have been trying to contact her. She stated "Y'all could have texted my phone it is the same fonseca phone number. This is ridiculous." I advised her that we have no way of texting her and we will not use our personal phones to contact her or text her. She said whatever. I confirmed the appointment with the patient one more time and thanked her.  "

## 2025-03-26 NOTE — TELEPHONE ENCOUNTER
"Per Dr. Pearce, "Patient needs appointment."  Attempted to call patient 3 times, no answer for any attempted calls. It says no voicemail system is set up. Unable to leave voicemail.  "

## 2025-03-27 ENCOUNTER — OFFICE VISIT (OUTPATIENT)
Dept: INTERNAL MEDICINE | Facility: CLINIC | Age: 70
End: 2025-03-27
Payer: MEDICARE

## 2025-03-27 VITALS
HEART RATE: 89 BPM | WEIGHT: 128 LBS | HEIGHT: 62 IN | SYSTOLIC BLOOD PRESSURE: 126 MMHG | DIASTOLIC BLOOD PRESSURE: 66 MMHG | OXYGEN SATURATION: 98 % | BODY MASS INDEX: 23.55 KG/M2

## 2025-03-27 DIAGNOSIS — L03.116 CELLULITIS OF LEFT LOWER EXTREMITY: Primary | ICD-10-CM

## 2025-03-27 DIAGNOSIS — M79.89 SWELLING OF LEFT FOOT: ICD-10-CM

## 2025-03-27 DIAGNOSIS — E10.3593 TYPE 1 DIABETES MELLITUS WITH PROLIFERATIVE DIABETIC RETINOPATHY WITHOUT MACULAR EDEMA, BILATERAL: ICD-10-CM

## 2025-03-27 RX ORDER — DOXYCYCLINE HYCLATE 100 MG
100 TABLET ORAL 2 TIMES DAILY
Qty: 14 TABLET | Refills: 0 | Status: SHIPPED | OUTPATIENT
Start: 2025-03-27 | End: 2025-04-03

## 2025-03-27 RX ORDER — MUPIROCIN 20 MG/G
OINTMENT TOPICAL 3 TIMES DAILY
Qty: 22 G | Refills: 1 | Status: SHIPPED | OUTPATIENT
Start: 2025-03-27

## 2025-03-27 NOTE — PROGRESS NOTES
Internal Medicine      Patient Name:  Daisy Calderon  Patient ID:  [id number]    Chief Complaint:  Wound Check (Left foot )      Daisy Calderon is a 69 y.o. female, known to Dr Pearce, is here today for requested visit.  Medical comorbidities include diabetes mellitus type 1, hypothyroidism and diagnosis of DCIS status post right mastectomy with axillary lymph node excision and intolerance to letrozole , now on anastrozole and seems to be doing better.     History of Present Illness    CHIEF COMPLAINT:  Ms. Calderon presents today with foot injury and infection after dropping a concrete block on it 9 days ago.    CURRENT INJURY:  She sustained bilateral foot injuries 9 days ago from a concrete block, with left foot more severely affected with multiple cuts. Right foot had minor injury with one small area of impact that has largely resolved. Left foot is red and was previously hot to touch. She reports pain and tenderness in the affected area. She denies drainage from wounds, fever, or chills. She has been self-treating with Mupirocin ointment, gauze dressing, and covering with plastic bag when going outside.    MEDICAL HISTORY:  She has Type 1 Diabetes, rheumatoid arthritis, and neuropathy in the feet. She has history of right foot fracture from a stack of Gordons falling on it, which she reports was more severe than current injury.    DIABETES MANAGEMENT:  She uses an insulin pump. She reports worsening blood sugar control due to reduced physical activity while keeping foot elevated. She typically manages blood sugar through gardening and exercise.  Followed closely by endocrinology.    PAIN MANAGEMENT:  She has been using tramadol sparingly for pain management, taking only half a pill since recent incident. She has a few pills remaining from a prescription nearly a year old. She expresses reluctance to take pain medication, typically managing rheumatoid arthritis pain without medication. She only takes  "tramadol when pain is "severe".       Last AWV:  2024        Past Medical History:   Diagnosis Date    Broken foot 2024    Diabetes type I     Ductal carcinoma in situ (DCIS) of right breast     HTN (hypertension)     Hypothyroidism, unspecified     Tobacco user         Past Surgical History:   Procedure Laterality Date    BREAST SURGERY  2024    CARPAL TUNNEL RELEASE      CATARACT EXTRACTION       SECTION      CHOLECYSTECTOMY      COLON SURGERY  Polip removal    EYE SURGERY      Laser for bleeding over 5 years ago, cataracts removed from both eyes    SIMPLE MASTECTOMY Right 2024    Procedure: MASTECTOMY, SIMPLE  ///right, MagTrace to be Injected Intra-operatively;  Surgeon: Cindy Cohen MD;  Location: [facility] OR;  Service: General;  Laterality: Right;  MagTrace to be Injected Intra-operatively    SURGICAL REMOVAL OF LYMPH NODE Right 2024    Procedure: EXCISION, LYMPH NODE  ///right, Send fresh for flow cytometry, Send for Pathology;  Surgeon: Cindy Cohen MD;  Location: [facility] OR;  Service: General;  Laterality: Right;  Send fresh for flow cytometry  Send for Pathology    TOTAL ABDOMINAL HYSTERECTOMY      TUBAL LIGATION          Social History     Tobacco Use    Smoking status: Former     Current packs/day: 0.25     Types: Cigarettes    Smokeless tobacco: Never   Substance and Sexual Activity    Alcohol use: Not Currently     Alcohol/week: 6.0 standard drinks of alcohol     Comment: quit 4 months ago    Drug use: Never    Sexual activity: Not Currently     Partners: Male        Current Outpatient Medications   Medication Instructions    anastrozole (ARIMIDEX) 1 mg, Oral, Daily    doxycycline (VIBRA-TABS) 100 mg, Oral, 2 times daily    insulin aspart U-100 (NOVOLOG) 35 Units, Daily    latanoprost 0.005 % ophthalmic solution 1 drop, Nightly    levothyroxine (SYNTHROID) 125 mcg, Before breakfast    multivitamin (THERAGRAN) per tablet 1 tablet, Daily    mupirocin (BACTROBAN) 2 " % ointment Topical (Top), 3 times daily    ondansetron (ZOFRAN) 4 mg, Oral, Every 6 hours PRN    romosozumab-aqqg (EVENITY) 105 mg, Every 30 days    rosuvastatin (CRESTOR) 20 mg, Oral, Daily    traMADoL (ULTRAM) 50 mg, Every 6 hours PRN       Review of patient's allergies indicates:   Allergen Reactions    Aspirin      Other Reaction(s): Unknown    Levofloxacin      Other reaction(s): itching, swelling    Oxycodone      Other Reaction(s): Unknown    Penicillin      Other Reaction(s): Unknown    Brimonidine Nausea And Vomiting        Patient Care Team:  Yana Pearce MD as PCP - General (Internal Medicine)  Igor Mosqueda Jr., MD as Consulting Physician (Ophthalmology)  Freeman Beltrán MD as Consulting Physician (Endocrinology)  Hilaria Moya as ED Navigator  Denise Arriola LPN as Care Coordinator  Andria Kim MD (Dermatology)  Cindy Cohen MD as Consulting Physician (Breast Surgery)       Subjective:    Review of Systems   Constitutional:  Negative for appetite change, chills, diaphoresis and fever.   HENT:  Negative for ear pain, sinus pain and sore throat.    Eyes:  Negative for pain and visual disturbance.   Respiratory:  Negative for cough, shortness of breath and wheezing.    Cardiovascular:  Negative for chest pain, palpitations and leg swelling.   Gastrointestinal:  Negative for abdominal pain, constipation, diarrhea, nausea and vomiting.   Endocrine: Negative for cold intolerance.   Genitourinary:  Negative for difficulty urinating, dysuria, frequency and hematuria.   Musculoskeletal:  Negative for arthralgias and myalgias.   Skin:  Positive for wound. Negative for color change and rash.   Allergic/Immunologic: Negative.    Neurological: Negative.  Negative for dizziness, syncope, light-headedness and numbness.   Hematological: Negative.    Psychiatric/Behavioral: Negative.  Negative for dysphoric mood. The patient is not nervous/anxious.    All other systems reviewed and  "are negative.      Objective:    Visit Vitals  /66 (BP Location: Right arm, Patient Position: Sitting)   Pulse 89   Ht 5' 2" (1.575 m)   Wt 58.1 kg (128 lb)   SpO2 98%   BMI 23.41 kg/m²       Physical Exam  Vitals and nursing note reviewed.   Constitutional:       General: She is not in acute distress.     Appearance: She is not ill-appearing.   HENT:      Head: Normocephalic and atraumatic.      Mouth/Throat:      Mouth: Mucous membranes are moist.      Pharynx: Oropharynx is clear.   Eyes:      General: No scleral icterus.     Extraocular Movements: Extraocular movements intact.      Conjunctiva/sclera: Conjunctivae normal.      Pupils: Pupils are equal, round, and reactive to light.   Neck:      Vascular: No carotid bruit.   Cardiovascular:      Rate and Rhythm: Normal rate and regular rhythm.      Heart sounds: No murmur heard.     No friction rub. No gallop.   Pulmonary:      Effort: Pulmonary effort is normal. No respiratory distress.      Breath sounds: Normal breath sounds. No wheezing, rhonchi or rales.   Abdominal:      General: Bowel sounds are normal. There is no distension.      Palpations: Abdomen is soft. There is no mass.      Tenderness: There is no abdominal tenderness.   Musculoskeletal:         General: Normal range of motion.      Cervical back: Normal range of motion and neck supple.   Skin:     General: Skin is warm and dry.      Capillary Refill: Capillary refill takes less than 2 seconds.      Findings: Wound present.      Comments: Left foot with erythema, tender to touch, no drainage (see photo below)   Neurological:      General: No focal deficit present.      Mental Status: She is alert and oriented to person, place, and time.   Psychiatric:         Mood and Affect: Mood normal.         Behavior: Behavior normal.       Left foot wound:        Labs Reviewed:    Chemistry:  Lab Results   Component Value Date     (L) 01/28/2025    K 4.0 01/28/2025    BUN 19.3 01/28/2025    " CREATININE 0.82 01/28/2025    EGFRNORACEVR >60 01/28/2025    GLUCOSE 230 (H) 01/28/2025    CALCIUM 9.2 01/28/2025    ALKPHOS 90 01/28/2025    LABPROT 7.2 01/28/2025    ALBUMIN 3.9 01/28/2025    BILIDIR 0.3 01/13/2022    IBILI 0.40 01/13/2022    AST 16 01/28/2025    ALT 14 01/28/2025    MG 1.90 03/03/2024    PHOS 2.5 03/03/2024        Lab Results   Component Value Date    HGBA1C 6.8 (A) 02/06/2024        Hematology:  Lab Results   Component Value Date    WBC 6.39 01/28/2025    HGB 13.0 01/28/2025    HCT 38.0 01/28/2025     01/28/2025       Lipid Panel:  Lab Results   Component Value Date    CHOL 193 02/06/2024    HDL 82 (A) 02/06/2024    TRIG 126 02/06/2024        Urine:  Lab Results   Component Value Date    APPEARANCEUA Clear 02/17/2024    SGUA 1.017 02/17/2024    PROTEINUA 1+ (A) 02/17/2024    KETONESUA 3+ (A) 02/17/2024    LEUKOCYTESUR Negative 02/17/2024    RBCUA 0-5 02/17/2024    WBCUA 0-5 02/17/2024    BACTERIA Occasional (A) 02/17/2024    SQEPUA Trace 02/17/2024    HYALINECASTS 11-20 (A) 01/17/2024          Assessment:      ICD-10-CM ICD-9-CM   1. Cellulitis of left lower extremity  L03.116 682.6   2. Swelling of left foot  M79.89 729.81   3. Type 1 diabetes mellitus with proliferative diabetic retinopathy without macular edema, bilateral  E10.3593 250.51     362.02       Plan:    1. Cellulitis of left lower extremity  Assessment & Plan:  Doxycycline 100 mg b.i.d. x7 days sent to pharmacy  Wound Care:  Keep area clean and dry.  Apply mupirocin ointment t.i.d.   Pain: Take OTC Tylenol or Ibuprofen per package instructions as needed for pain.  Loosen the bandage if needed.   Follow up within 7 days for a recheck.   Present to the Emergency Department for any significant change or worsening symptoms including worsening redness, swelling, purulent discharge, fever, body aches, or chills.     Orders:  -     doxycycline (VIBRA-TABS) 100 MG tablet; Take 1 tablet (100 mg total) by mouth 2 (two) times daily.  for 7 days  Dispense: 14 tablet; Refill: 0  -     mupirocin (BACTROBAN) 2 % ointment; Apply topically 3 (three) times daily.  Dispense: 22 g; Refill: 1  -     X-Ray Foot Complete Left; Future; Expected date: 03/27/2025    2. Swelling of left foot  Assessment & Plan:  see cellulitis plan   Will obtain x-ray left foot to rule out osteomyelitis    Orders:  -     X-Ray Foot Complete Left; Future; Expected date: 03/27/2025    3. Type 1 diabetes mellitus with proliferative diabetic retinopathy without macular edema, bilateral  Assessment & Plan:  Stable   On insulin pump   Followed by endocrinology         Follow up in about 1 week (around 4/3/2025) for wound recheck. In addition to their scheduled follow up, the patient has also been instructed to follow up on as needed basis.       Future Appointments   Date Time Provider Department Center   4/1/2025  3:00 PM Hallie Nassar FNP [facility] [place] [address]   4/15/2025  2:30 PM INJECTION CHAIR , [facility] CHEMOTHERAPY INFUSION [facility] CHEMO [facility]   4/29/2025 12:50 [facility]   4/29/2025  1:00 PM [facility]   5/13/2025  2:40 PM Mariah Alexander PA [facility]   8/4/2025  1:40 PM [facility] [place] [address]          KENNY Deras      Disclaimer:  This note was generated with the assistance of ambient listening technology. Verbal consent was obtained by the patient and accompanying visitor(s) for the recording of patient appointment to facilitate this note. I attest to having reviewed and edited the generated note for accuracy, though some syntax or spelling errors may persist. Please contact the author of this note for any clarification.

## 2025-03-27 NOTE — ASSESSMENT & PLAN NOTE
Doxycycline 100 mg b.i.d. x7 days sent to pharmacy  Wound Care:  Keep area clean and dry.  Apply mupirocin ointment t.i.d.   Pain: Take OTC Tylenol or Ibuprofen per package instructions as needed for pain.  Loosen the bandage if needed.   Follow up within 7 days for a recheck.   Present to the Emergency Department for any significant change or worsening symptoms including worsening redness, swelling, purulent discharge, fever, body aches, or chills.

## 2025-03-31 ENCOUNTER — TELEPHONE (OUTPATIENT)
Dept: INTERNAL MEDICINE | Facility: CLINIC | Age: 70
End: 2025-03-31
Payer: MEDICARE

## 2025-03-31 NOTE — TELEPHONE ENCOUNTER
Copied from CRM #[id number]. Topic: Appointments - Amb Referral  >> Mar 31, 2025  8:57 AM Brigitte wrote:  Who Called: Daisy Calderon    What order is the patient requesting: XR of foot left   When does the expect the orders to be performed? [facility] office# [phone] pt requesting to go ASAP    Preferred Method of Contact: Phone Call  Patient's Preferred Phone Number on File: [phone]   Best Call Back Number, if different:  Additional Information: order request pt called to request orders sent to facility above, please advise, thanks

## 2025-03-31 NOTE — TELEPHONE ENCOUNTER
Order fixed and put for [facility]. Called to speak with Pt, Pt does not have a voice mail set up.

## 2025-03-31 NOTE — TELEPHONE ENCOUNTER
Spoke with pt, Pt stated it was [facility] where she would like to get the X Ray done. Earlier message was incorrect. X ray routed to [facility], Pt notified.

## 2025-04-01 ENCOUNTER — OFFICE VISIT (OUTPATIENT)
Dept: INTERNAL MEDICINE | Facility: CLINIC | Age: 70
End: 2025-04-01
Payer: MEDICARE

## 2025-04-01 VITALS
BODY MASS INDEX: 23.55 KG/M2 | OXYGEN SATURATION: 98 % | DIASTOLIC BLOOD PRESSURE: 68 MMHG | HEART RATE: 86 BPM | HEIGHT: 62 IN | WEIGHT: 128 LBS | SYSTOLIC BLOOD PRESSURE: 122 MMHG

## 2025-04-01 DIAGNOSIS — L03.116 CELLULITIS OF LEFT LOWER EXTREMITY: Primary | ICD-10-CM

## 2025-04-01 DIAGNOSIS — E10.3593 TYPE 1 DIABETES MELLITUS WITH PROLIFERATIVE DIABETIC RETINOPATHY WITHOUT MACULAR EDEMA, BILATERAL: ICD-10-CM

## 2025-04-01 DIAGNOSIS — M79.89 SWELLING OF LEFT FOOT: ICD-10-CM

## 2025-04-01 PROCEDURE — 3074F SYST BP LT 130 MM HG: CPT | Mod: CPTII,,, | Performed by: INTERNAL MEDICINE

## 2025-04-01 PROCEDURE — 3288F FALL RISK ASSESSMENT DOCD: CPT | Mod: CPTII,,, | Performed by: INTERNAL MEDICINE

## 2025-04-01 PROCEDURE — 99214 OFFICE O/P EST MOD 30 MIN: CPT | Mod: ,,, | Performed by: INTERNAL MEDICINE

## 2025-04-01 PROCEDURE — 3008F BODY MASS INDEX DOCD: CPT | Mod: CPTII,,, | Performed by: INTERNAL MEDICINE

## 2025-04-01 PROCEDURE — 1159F MED LIST DOCD IN RCRD: CPT | Mod: CPTII,,, | Performed by: INTERNAL MEDICINE

## 2025-04-01 PROCEDURE — 1160F RVW MEDS BY RX/DR IN RCRD: CPT | Mod: CPTII,,, | Performed by: INTERNAL MEDICINE

## 2025-04-01 PROCEDURE — 3078F DIAST BP <80 MM HG: CPT | Mod: CPTII,,, | Performed by: INTERNAL MEDICINE

## 2025-04-01 PROCEDURE — 1101F PT FALLS ASSESS-DOCD LE1/YR: CPT | Mod: CPTII,,, | Performed by: INTERNAL MEDICINE

## 2025-04-01 RX ORDER — GLUCOSAMINE/CHONDRO SU A 500-400 MG
1 TABLET ORAL DAILY
COMMUNITY

## 2025-04-01 NOTE — ASSESSMENT & PLAN NOTE
Continue doxycycline as previously prescribed  Wound Care:  Keep area clean and dry.  Apply mupirocin ointment t.i.d.   Pain: Take OTC Tylenol or Ibuprofen per package instructions as needed for pain.  Loosen the bandage if needed.   Present to the Emergency Department for any significant change or worsening symptoms including worsening redness, swelling, purulent discharge, fever, body aches, or chills.

## 2025-04-01 NOTE — PROGRESS NOTES
Internal Medicine      Patient Name:  Daisy Calderon  Patient ID:  [id number]    Chief Complaint:  Follow-up (1wk)      Daisy Calderon is a 69 y.o. female, known to Dr Pearce, is here today for wound follow up.  Medical comorbidities include diabetes mellitus type 1, hypothyroidism and diagnosis of DCIS status post right mastectomy with axillary lymph node excision and intolerance to letrozole , now on anastrozole and seems to be doing better.     She was seen in office on 3/27/25 due to wound on her left foot.  She reported dropping concrete block onto her foot 9 days prior.  Upon exam, left foot was red with associated pain and tenderness.  She was prescribed doxycycline 100mg BID x7 days and mupirocin TID.  Xray left foot was ordered to r/o osteomyelitis.  Xray completed on 3/31/25 and shows no acute fracture or malalignment; does show linear 5mm splinter-like radiodense foreign body suspected within the soft tissue of the heel pad.    Presents today for wound re-evaluation.  She reports left foot is no longer tender to palpation.  She does report overall improvement.  Denies fever or chills.  She has 2 days remaining of doxycycline.    Last AWV:  24     The patient was initially on nurse practitioner schedule however I took over care since this was a 2nd visit in 1 week with some worsening symptoms  Past Medical History:   Diagnosis Date    Broken foot 2024    Diabetes type I     Ductal carcinoma in situ (DCIS) of right breast     HTN (hypertension)     Hypothyroidism, unspecified     Tobacco user         Past Surgical History:   Procedure Laterality Date    BREAST SURGERY  2024    CARPAL TUNNEL RELEASE      CATARACT EXTRACTION       SECTION      CHOLECYSTECTOMY      COLON SURGERY  Polip removal    EYE SURGERY      Laser for bleeding over 5 years ago, cataracts removed from both eyes    SIMPLE MASTECTOMY Right 2024    Procedure: MASTECTOMY, SIMPLE  ///right, MagTrace to  be Injected Intra-operatively;  Surgeon: Cindy Cohen MD;  Location: [facility] OR;  Service: General;  Laterality: Right;  MagTrace to be Injected Intra-operatively    SURGICAL REMOVAL OF LYMPH NODE Right 08/05/2024    Procedure: EXCISION, LYMPH NODE  ///right, Send fresh for flow cytometry, Send for Pathology;  Surgeon: Cindy Cohen MD;  Location: [facility] OR;  Service: General;  Laterality: Right;  Send fresh for flow cytometry  Send for Pathology    TOTAL ABDOMINAL HYSTERECTOMY      TUBAL LIGATION          Social History     Tobacco Use    Smoking status: Former     Current packs/day: 0.25     Types: Cigarettes    Smokeless tobacco: Never   Substance and Sexual Activity    Alcohol use: Not Currently     Alcohol/week: 6.0 standard drinks of alcohol     Comment: quit 4 months ago    Drug use: Never    Sexual activity: Not Currently     Partners: Male        Current Outpatient Medications   Medication Instructions    anastrozole (ARIMIDEX) 1 mg, Oral, Daily    doxycycline (VIBRA-TABS) 100 mg, Oral, 2 times daily    glucosamine-chondroitin 500-400 mg tablet 1 tablet, Daily    insulin aspart U-100 (NOVOLOG) 35 Units, Daily    latanoprost 0.005 % ophthalmic solution 1 drop, Nightly    levothyroxine (SYNTHROID) 125 mcg, Before breakfast    multivitamin (THERAGRAN) per tablet 1 tablet, Daily    mupirocin (BACTROBAN) 2 % ointment Topical (Top), 3 times daily    ondansetron (ZOFRAN) 4 mg, Oral, Every 6 hours PRN    romosozumab-aqqg (EVENITY) 105 mg, Every 30 days    rosuvastatin (CRESTOR) 20 mg, Oral, Daily    traMADoL (ULTRAM) 50 mg, Every 6 hours PRN       Review of patient's allergies indicates:   Allergen Reactions    Aspirin      Other Reaction(s): Unknown    Levofloxacin      Other reaction(s): itching, swelling    Oxycodone      Other Reaction(s): Unknown    Penicillin      Other Reaction(s): Unknown    Brimonidine Nausea And Vomiting        Patient Care Team:  Yana Pearce MD as PCP - General (Internal  "Medicine)  Igor Mosqueda Jr., MD as Consulting Physician (Ophthalmology)  Freeman Beltrán MD as Consulting Physician (Endocrinology)  Hilaria Moya as ED Navigator  Denise Arriola LPN as Care Coordinator  Andria Kim MD (Dermatology)  Cindy Cohen MD as Consulting Physician (Breast Surgery)       Subjective:    Review of Systems   Constitutional:  Negative for appetite change, chills, diaphoresis and fever.   HENT:  Negative for ear pain, sinus pain and sore throat.    Eyes:  Negative for pain and visual disturbance.   Respiratory:  Negative for cough, shortness of breath and wheezing.    Cardiovascular:  Negative for chest pain, palpitations and leg swelling.   Gastrointestinal:  Negative for abdominal pain, constipation, diarrhea, nausea and vomiting.   Endocrine: Negative for cold intolerance.   Genitourinary:  Negative for difficulty urinating, dysuria, frequency and hematuria.   Musculoskeletal:  Negative for arthralgias and myalgias.   Skin:  Positive for wound. Negative for color change and rash.   Allergic/Immunologic: Negative.    Neurological: Negative.  Negative for dizziness, syncope, light-headedness and numbness.   Hematological: Negative.    Psychiatric/Behavioral: Negative.  Negative for dysphoric mood. The patient is not nervous/anxious.    All other systems reviewed and are negative.      Objective:    Visit Vitals  /68 (BP Location: Left arm, Patient Position: Sitting)   Pulse 86   Ht 5' 2" (1.575 m)   Wt 58.1 kg (128 lb)   SpO2 98%   BMI 23.41 kg/m²       Physical Exam  Vitals and nursing note reviewed.   Constitutional:       General: She is not in acute distress.     Appearance: She is not ill-appearing.   HENT:      Head: Normocephalic and atraumatic.      Mouth/Throat:      Mouth: Mucous membranes are moist.      Pharynx: Oropharynx is clear.   Eyes:      General: No scleral icterus.     Extraocular Movements: Extraocular movements intact.      " Conjunctiva/sclera: Conjunctivae normal.      Pupils: Pupils are equal, round, and reactive to light.   Neck:      Vascular: No carotid bruit.   Cardiovascular:      Rate and Rhythm: Normal rate and regular rhythm.      Heart sounds: No murmur heard.     No friction rub. No gallop.   Pulmonary:      Effort: Pulmonary effort is normal. No respiratory distress.      Breath sounds: Normal breath sounds. No wheezing, rhonchi or rales.   Abdominal:      General: Bowel sounds are normal. There is no distension.      Palpations: Abdomen is soft. There is no mass.      Tenderness: There is no abdominal tenderness.   Musculoskeletal:         General: Normal range of motion.      Cervical back: Normal range of motion and neck supple.   Skin:     General: Skin is warm and dry.      Capillary Refill: Capillary refill takes less than 2 seconds.      Findings: Wound present.      Comments: Left foot with erythema, no drainage (see photo below)   Neurological:      General: No focal deficit present.      Mental Status: She is alert and oriented to person, place, and time.   Psychiatric:         Mood and Affect: Mood normal.         Behavior: Behavior normal.       Left foot wound:        Labs Reviewed:    Chemistry:  Lab Results   Component Value Date     (L) 01/28/2025    K 4.0 01/28/2025    BUN 19.3 01/28/2025    CREATININE 0.82 01/28/2025    EGFRNORACEVR >60 01/28/2025    GLUCOSE 230 (H) 01/28/2025    CALCIUM 9.2 01/28/2025    ALKPHOS 90 01/28/2025    LABPROT 7.2 01/28/2025    ALBUMIN 3.9 01/28/2025    BILIDIR 0.3 01/13/2022    IBILI 0.40 01/13/2022    AST 16 01/28/2025    ALT 14 01/28/2025    MG 1.90 03/03/2024    PHOS 2.5 03/03/2024        Lab Results   Component Value Date    HGBA1C 6.8 (A) 02/06/2024        Hematology:  Lab Results   Component Value Date    WBC 6.39 01/28/2025    HGB 13.0 01/28/2025    HCT 38.0 01/28/2025     01/28/2025       Lipid Panel:  Lab Results   Component Value Date    CHOL 193  02/06/2024    HDL 82 (A) 02/06/2024    TRIG 126 02/06/2024        Urine:  Lab Results   Component Value Date    APPEARANCEUA Clear 02/17/2024    SGUA 1.017 02/17/2024    PROTEINUA 1+ (A) 02/17/2024    KETONESUA 3+ (A) 02/17/2024    LEUKOCYTESUR Negative 02/17/2024    RBCUA 0-5 02/17/2024    WBCUA 0-5 02/17/2024    BACTERIA Occasional (A) 02/17/2024    SQEPUA Trace 02/17/2024    HYALINECASTS 11-20 (A) 01/17/2024        Assessment:      ICD-10-CM ICD-9-CM   1. Cellulitis of left lower extremity  L03.116 682.6   2. Swelling of left foot  M79.89 729.81   3. 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 Cellulitis of left lower extremity  Assessment & Plan:  Continue doxycycline as previously prescribed  Wound Care:  Keep area clean and dry.  Apply mupirocin ointment t.i.d.   Pain: Take OTC Tylenol or Ibuprofen per package instructions as needed for pain.  Loosen the bandage if needed.   Present to the Emergency Department for any significant change or worsening symptoms including worsening redness, swelling, purulent discharge, fever, body aches, or chills.       2. Swelling of left foot  Assessment & Plan:  Continue doxycycline as previously prescribed   Left foot x-ray showed no acute fracture or malalignment.  However, did show linear 5 mm splinter like radiodense foreign body suspected within the soft tissue of the heel pad.  She denies pain to left heel.  Encouraged to elevate left leg when able.      3. 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 In addition to their scheduled follow up, the patient has also been instructed to follow up on as needed basis.       Future Appointments   Date Time Provider Department Center    4/15/2025  2:30 PM INJECTION CHAIR , [facility] CHEMOTHERAPY INFUSION [facility] CHEMO [facility]   4/29/2025 12:50 PM [facility]   4/29/2025  1:00 PM Sandra Dawson, [facility]EM[facility]   5/13/2025  2:40 PM [facility]   8/4/2025  1:40 PM Yana Pearce MD [facility] [place] [id number]          Hallie Nassar, [facility]

## 2025-04-01 NOTE — ASSESSMENT & PLAN NOTE
Continue doxycycline as previously prescribed   Left foot x-ray showed no acute fracture or malalignment.  However, did show linear 5 mm splinter like radiodense foreign body suspected within the soft tissue of the heel pad.  She denies pain to left heel.  Encouraged to elevate left leg when able.

## 2025-04-03 DIAGNOSIS — S92.314A CLOSED NONDISPLACED FRACTURE OF FIRST METATARSAL BONE OF RIGHT FOOT, INITIAL ENCOUNTER: Primary | ICD-10-CM

## 2025-04-03 RX ORDER — TRAMADOL HYDROCHLORIDE 50 MG/1
50 TABLET ORAL EVERY 6 HOURS PRN
Qty: 30 EACH | Refills: 0 | Status: SHIPPED | OUTPATIENT
Start: 2025-04-03

## 2025-04-03 NOTE — TELEPHONE ENCOUNTER
----- Message from Deanna sent at 4/3/2025  4:10 PM CDT -----  Who Called: Daisy CalderonRefill or New Rx:RefillRX Name and Strength:traMADoL (ULTRAM) 50 mg tabletHow is the patient currently taking it? (ex. 1XDay): Take 50 mg by mouth every 6 (six) hours as needed for Pain. 1/2 tab PRN - OralIs this a 30 day or 90 day RX:30Local or Mail Order:local List of preferred pharmacies on file (remove unneeded): [facility] #[id number] - [place], [place] - [address]Ordering Provider:Yana Pearce MDPreferred Method of Contact: Phone CallPatient's Preferred Phone Number on File: [phone] Best Call Back Number, if different:Additional Information:

## 2025-04-08 DIAGNOSIS — S92.314A CLOSED NONDISPLACED FRACTURE OF FIRST METATARSAL BONE OF RIGHT FOOT, INITIAL ENCOUNTER: ICD-10-CM

## 2025-04-14 ENCOUNTER — TELEPHONE (OUTPATIENT)
Dept: INTERNAL MEDICINE | Facility: CLINIC | Age: 70
End: 2025-04-14
Payer: MEDICARE

## 2025-04-14 NOTE — TELEPHONE ENCOUNTER
----- Message from Deanna sent at 4/14/2025 10:02 AM CDT -----  Who Called: Genna is requesting assistance/information from provider's office.Symptoms (please be specific): n/a How long has patient had these symptoms:  n/aList of preferred pharmacies on file (remove unneeded): n/aPreferred Method of Contact: Phone CallPatient's Preferred Phone Number on File: [phone] Best Call Back Number, if different:[phone] Information: Needs clarification on traMADoL (ULTRAM) 50 mg tablet directions. Please advise.

## 2025-05-02 ENCOUNTER — TELEPHONE (OUTPATIENT)
Dept: INTERNAL MEDICINE | Facility: CLINIC | Age: 70
End: 2025-05-02
Payer: MEDICARE

## 2025-05-02 NOTE — TELEPHONE ENCOUNTER
Copied from CRM #[id number]. Topic: General Inquiry - Patient Advice  >> May 2, 2025  8:41 AM Deanna wrote:  Who Called: Daisy C Thompson    Caller is requesting assistance/information from provider's office.    Symptoms (please be specific): cut on little toe. Knife fell on toe on 4/30/25. Pt stated that she is not having any symptoms of infection.   How long has patient had these symptoms:  n/a  List of preferred pharmacies on file (remove unneeded): [facility][id number] - [place], [place] - [address] AT [address] [[id number]]  Preferred Method of Contact: Phone Call  Patient's Preferred Phone Number on File: [phone]   Best Call Back Number, if different:  Additional Information: Pt stated that a knife fell on her toe and she is concerned about infection due to being diabetic. Pt stated that she just finished a antibiotic 3 weeks and five days ago. Pt stated that she is putting Mupirocin ointment on it, cleaning wound daily, keeps it elevated, and wrapped up so no dirt gets on wound. Pt stated that it does not seem deep or large. Please advise.

## 2025-05-02 NOTE — TELEPHONE ENCOUNTER
Called Pt, Pt has no voice mail set up to leave a message. Per Dr Pearce if Pt feels as though she could be getting an infection in her toe, Pt can get on the schedule. Pt does seem to be doing all the right things to prevent infection.

## 2025-05-05 ENCOUNTER — TELEPHONE (OUTPATIENT)
Dept: INTERNAL MEDICINE | Facility: CLINIC | Age: 70
End: 2025-05-05
Payer: MEDICARE

## 2025-05-05 NOTE — TELEPHONE ENCOUNTER
Patient reports that he toe is doing a lot better. Her toe does not hurt and it does not feel feverish. She states that she feels like she does not need an appointment. I advised her if it takes a turn to let us know and we can see her. She voiced understanding.

## 2025-05-05 NOTE — TELEPHONE ENCOUNTER
Copied from CRM #[id number]. Topic: General Inquiry - Return Call  >> May 2, 2025 12:01 PM Santos wrote:  Who Called: Daisy Calderon    Patient is returning phone call    Who Left Message for Patient:Tish  Does the patient know what this is regarding?: cut on toe      Preferred Method of Contact: Phone Call  Patient's Preferred Phone Number on File: [phone]   Best Call Back Number, if different:  Additional Information: PT is returning call to Tish.

## 2025-05-05 NOTE — TELEPHONE ENCOUNTER
Copied from CRM #[id number]. Topic: General Inquiry - Return Call  >> May 2, 2025 12:01 PM Santos wrote:  Who Called: Daisy Calderon    Patient is returning phone call    Who Left Message for Patient:Tish  Does the patient know what this is regarding?: cut on toe      Preferred Method of Contact: Phone Call  Patient's Preferred Phone Number on File: [phone]   Best Call Back Number, if different:  Additional Information: PT is returning call to Tish.

## 2025-05-08 ENCOUNTER — DOCUMENTATION ONLY (OUTPATIENT)
Facility: CLINIC | Age: 70
End: 2025-05-08
Payer: MEDICARE

## 2025-05-12 NOTE — PROGRESS NOTES
[facility] Breast Surg  Breast Surgical Oncology  Follow-Up Patient Office Visit       Referring Provider: No ref. provider found  PCP: Yana Pearce MD   Medical Oncologist: No care team member to display   Radiation Oncologist: No care team member to display   Other Care Providers:     Chief Complaint:   Chief Complaint   Patient presents with    Follow-up     Patient reports no breast related concerns         Subjective:   Treatment History:  2024 Right Mastectomy  Letrozole started 2024. Stopped 10/29/2024 s/t side effects    Interval History:  2025 - Daisy Calderon is here for 6 month follow-up.     Patient also states that she has numbness/itching to her right mastectomy, which is common following a mastectomy and sentinel lymph node biopsy. No visible skin changes.    HPI:  Daisy Calderon is a 69 y.o. female who initially presented on 2024 for evaluation of newly diagnosed right  breast cancer.     A detailed patient history was obtained and reviewed. She currently denies any breast issues including rashes, redness, pain, swelling, nipple discharge, or new lumps/masses.     MG breast density: Category C (heterogeneously dense)      Imagin2023  BL SC MG at [facility]  IMPRESSION: INCOMPLETE: NEEDS ADDITIONAL IMAGING   -In the R breast upper outer quadrant middle depth, there are indeterminate calcifications.  -No other significant masses, calcifications, or other findings are seen in either breast.       2024  R DG MG at [facility]  IMPRESSION: SUSPICIOUS OF MALIGNANCY   -Amorphous calcifications in a regional distribution spanning 5.0 cm in the upper-outer quadrant of the R breast, anterior to middle depths.      2024  BL BREAST MRI  IMPRESSION: INCOMPLETE: NEEDS ADDITIONAL IMAGING EVALUATION    1.  No suspicious enhancement in either breast.    2.  Right breast 10 o'clock middle to posterior depths metallic clip and surrounding post  biopsy changes related to previous stereotactic biopsy of 2024, with pathology revealing ductal carcinoma in-situ.    3.  No suspicious enhancement at the site of recent benign stereotactic biopsy of calcifications at 12 o'clock right breast anterior depth.  4.  Several prominent level 1 lymph nodes within bilateral axillae with cortical thickening and partial hilar effacement, slightly more prominent in the right axilla compared to the left axilla, are of uncertain clinical significance.    2024 L SCR MG at [facility]  BIRADS 2: BENIGN: There is no mammographic evidence of malignancy.     Pathology:  2024  Stereotactic guided Core Needle Biopsy Right Breast 10 o'clock Middle Depth Calcifications  -Ductal carcinoma in situ, grade 2 with solid, cribriform and micropapillary architecture, focal comedo necrosis and multifocal microcalcifications  -Background nonproliferative fibrocystic changes with sclerosing adenosis and focal minute sclerosing intraductal papilloma  ER 92%  [place] 86%     2024  Stereotactic guided Core Needle Biopsy Right Breast 12 o'clock Anterior Depth Calcifications  -Nonproliferative fibrocystic changes with extensive sclerosing adenosis and microcalcifications  -No evidence of significant epithelial hyperplasia atypia or malignancy identified    2024  Right Mastectomy- Ductal carcinoma in situ, grade 2,  solid and cribriform, with focal necrosis and calcification (measuring 9 mm); focal sclerosing adenosis; focal fibroadenomatoid change; fibrocystic change.  All margins clear.  One lymph node with no evidence of neoplasm.       OB/GYN History:  Age at Menarche Onset: 14  Menopausal Status: postmenopausal, LMP: No LMP recorded. Patient has had a hysterectomy.  Hysterectomy/Oophorectomy: hysterectomy without BSO, at age 46  Hormonal birth control (duration): No none.   Pregnancy History:   Age at first live birth: 21  Hormone Replacement Therapy: Yes, none  Patient did not  breast feed.  Patient denies nipple discharge.   Patient denies to previous breast biopsy.   Patient denies to a personal history of breast cancer.     Other Relevant History:  Prior thoracic RT: none  Genetic testing: No  Ashkenazi Protestant descent: No    Family History:  Family History   Problem Relation Name Age of Onset    Breast cancer Sister Mari Mccarty 65    Arthritis Sister Mari Mccarty         Past History:  Past Medical History:   Diagnosis Date    Broken foot 2024    Diabetes type I     Ductal carcinoma in situ (DCIS) of right breast     HTN (hypertension)     Hypothyroidism, unspecified     Tobacco user         Past Surgical History:   Procedure Laterality Date    BREAST SURGERY  2024    CARPAL TUNNEL RELEASE      CATARACT EXTRACTION       SECTION      CHOLECYSTECTOMY      COLON SURGERY  Polip removal    EYE SURGERY      Laser for bleeding over 5 years ago, cataracts removed from both eyes    SIMPLE MASTECTOMY Right 2024    Procedure: MASTECTOMY, SIMPLE  ///right, MagTrace to be Injected Intra-operatively;  Surgeon: Cindy Cohen MD;  Location: [facility] OR;  Service: General;  Laterality: Right;  MagTrace to be Injected Intra-operatively    SURGICAL REMOVAL OF LYMPH NODE Right 2024    Procedure: EXCISION, LYMPH NODE  ///right, Send fresh for flow cytometry, Send for Pathology;  Surgeon: Cindy Cohen MD;  Location: [facility] OR;  Service: General;  Laterality: Right;  Send fresh for flow cytometry  Send for Pathology    TOTAL ABDOMINAL HYSTERECTOMY      TUBAL LIGATION          Social History     Socioeconomic History    Marital status: Single   Tobacco Use    Smoking status: Former     Current packs/day: 0.25     Types: Cigarettes    Smokeless tobacco: Never   Substance and Sexual Activity    Alcohol use: Not Currently     Alcohol/week: 6.0 standard drinks of alcohol     Comment: quit 4 months ago    Drug use: Never    Sexual activity: Not Currently     Partners: Male      Social Drivers of Health     Financial Resource Strain: Low Risk  (3/29/2025)    Overall Financial Resource Strain (CARDIA)     Difficulty of Paying Living Expenses: Not hard at all   Food Insecurity: No Food Insecurity (3/29/2025)    Hunger Vital Sign     Worried About Running Out of Food in the Last Year: Never true     Ran Out of Food in the Last Year: Never true   Transportation Needs: No Transportation Needs (3/29/2025)    PRAPARE - Transportation     Lack of Transportation (Medical): No     Lack of Transportation (Non-Medical): No   Physical Activity: Inactive (3/29/2025)    Exercise Vital Sign     Days of Exercise per Week: 0 days     Minutes of Exercise per Session: 0 min   Stress: No Stress Concern Present (3/29/2025)    [employer] - Occupational Stress Questionnaire     Feeling of Stress : Not at all   Housing Stability: Low Risk  (3/29/2025)    Housing Stability Vital Sign     Unable to Pay for Housing in the Last Year: No     Number of Times Moved in the Last Year: 0     Homeless in the Last Year: No        Body mass index is 22.5 kg/m².     Allergy/Medications:   Review of patient's allergies indicates:   Allergen Reactions    Aspirin      Other Reaction(s): Unknown    Levofloxacin      Other reaction(s): itching, swelling    Oxycodone      Other Reaction(s): Unknown    Penicillin      Other Reaction(s): Unknown    Brimonidine Nausea And Vomiting          Current Outpatient Medications:     anastrozole (ARIMIDEX) 1 mg Tab, Take 1 tablet (1 mg total) by mouth once daily., Disp: 90 tablet, Rfl: 0    glucosamine-chondroitin 500-400 mg tablet, Take 1 tablet by mouth once daily., Disp: , Rfl:     insulin aspart U-100 (NOVOLOG) 100 unit/mL injection, Inject 35 Units into the skin Daily., Disp: , Rfl:     latanoprost 0.005 % ophthalmic solution, Apply 1 drop to eye every evening., Disp: , Rfl:     levothyroxine (SYNTHROID) 125 MCG tablet, Take 125 mcg by mouth before  "breakfast., Disp: , Rfl:     multivitamin (THERAGRAN) per tablet, Take 1 tablet by mouth once daily., Disp: , Rfl:     mupirocin (BACTROBAN) 2 % ointment, Apply topically 3 (three) times daily., Disp: 22 g, Rfl: 1    ondansetron (ZOFRAN) 4 MG tablet, Take 1 tablet (4 mg total) by mouth every 6 (six) hours as needed for Nausea., Disp: 30 tablet, Rfl: 0    romosozumab-aqqg (EVENITY) 105 mg/1.17 mL, Inject 105 mg into the skin every 30 days., Disp: , Rfl:     rosuvastatin (CRESTOR) 20 MG tablet, Take 1 tablet (20 mg total) by mouth once daily., Disp: 90 tablet, Rfl: 3    traMADoL (ULTRAM) 50 mg tablet, Take 1 tablet (50 mg total) by mouth every 6 (six) hours as needed for Pain. 1/2 tab PRN, Disp: 30 each, Rfl: 0       Review of Systems:  Review of Systems   All other systems reviewed and are negative.      Objective:     Vitals:  Blood pressure 122/66, pulse 92, temperature 98.2 °F (36.8 °C), temperature source Oral, resp. rate 18, height 5' 2" (1.575 m), weight 55.8 kg (123 lb), SpO2 95%.      Physical Exam:  General: The patient is awake, alert and oriented times three. The patient is well nourished and in no acute distress.  Neck: There is no evidence of palpable cervical, supraclavicular or axillary adenopathy. The neck is supple. The thyroid is not enlarged.  Musculoskeletal: The patient has a normal range of motion of her bilateral upper extremities.  Chest: Examination of the chest wall fails to reveal any obvious abnormalities. Nonlabored breathing, symmetric expansion.  Breast:  Right: Examination of the Right mastectomy fails to reveal any dominant masses or areas of significant focal nodularity. The nipple is surgically absent. There are no significant skin changes overlying the breasts. There is no skin dimpling with movement of the pectoralis.   Left:  Examination of the left breast fails to reveal any dominant masses or areas of significant focal nodularity. The nipple is everted without evidence of " discharge. There is no skin dimpling with movement of the pectoralis. There are no significant skin changes overlying the breast.  Abdomen: The abdomen is soft, flat, nontender and nondistended.  Integumentary: no rashes or skin lesions present  Neurologic: cranial nerves intact, no signs of peripheral neurological deficit, motor/sensory function intact      Assessment and Plan:      Cancer Staging   Ductal carcinoma in situ (DCIS) of right breast  Staging form: Breast, AJCC 8th Edition  - Clinical stage from 7/2/2024: Stage 0 (cTis (DCIS), cN0, cM0, ER+, OK+, HER2: Not Assessed) - Signed by Cindy Cohen MD on 7/3/2024  - Pathologic stage from 8/13/2024: Stage 0 (pTis (DCIS), cN0, cM0, ER+, OK+, HER2: Not Assessed) - Signed by Cinyd Cohen MD on 8/16/2024 05/13/2025 - Daisy Calderon is here for 6 month follow-up.     Patient also states that she has numbness/itching to her right mastectomy, which is common following a mastectomy and sentinel lymph node biopsy. No visible skin changes.     Plan:     Left screening mammogram due in December 2025.  Recommend clinical follow up after MG.  Continue follow up with Oncology regarding adjuvant therapies.    Healthy lifestyle guidelines were reviewed. She was encouraged to engage in regular exercise, maintain a healthy body weight, and avoid excessive alcohol consumption. Healthy nutritional guidelines were also discussed. Self-breast examination was reviewed with the patient in detail and she was encouraged to perform this on a monthly basis.    CC: Dr. Pearce      All of her questions were answered. She was advised to call if she develops any questions or concerns.    Mariah Alexander PA-C        Total time on the date of the visit ranged from 30-39 mins ([id number]). Total time includes both face-to-face and non-face-to-face time personally spent by myself on the day of the visit.    Non-face-to-face time included:  _X_ preparing to see the patient  such as reviewing the patient record  __ obtaining and reviewing separately obtained history  _X_ independently interpreting results  _X_ documenting clinical information in electronic health record.    Face-to-face time included:  _X_ performing an appropriate history and examination  _X_ communicating results to the patient  _X_ counseling and educating the patient  __ ordering appropriate medications  __ ordering appropriate tests  _X_ ordering appropriate procedures (including follow-up)  _X_ answering any questions the patient had    Total Time spent on date of visit: 35 minutes

## 2025-05-13 ENCOUNTER — OFFICE VISIT (OUTPATIENT)
Dept: SURGERY | Facility: CLINIC | Age: 70
End: 2025-05-13
Payer: MEDICARE

## 2025-05-13 VITALS
WEIGHT: 123 LBS | RESPIRATION RATE: 18 BRPM | TEMPERATURE: 98 F | HEIGHT: 62 IN | BODY MASS INDEX: 22.63 KG/M2 | SYSTOLIC BLOOD PRESSURE: 122 MMHG | HEART RATE: 92 BPM | DIASTOLIC BLOOD PRESSURE: 66 MMHG | OXYGEN SATURATION: 95 %

## 2025-05-13 DIAGNOSIS — D05.11 DUCTAL CARCINOMA IN SITU (DCIS) OF RIGHT BREAST: Primary | ICD-10-CM

## 2025-05-13 DIAGNOSIS — Z90.11 STATUS POST RIGHT MASTECTOMY: ICD-10-CM

## 2025-05-13 PROCEDURE — 3078F DIAST BP <80 MM HG: CPT | Mod: CPTII,S$GLB,, | Performed by: PHYSICIAN ASSISTANT

## 2025-05-13 PROCEDURE — 3074F SYST BP LT 130 MM HG: CPT | Mod: CPTII,S$GLB,, | Performed by: PHYSICIAN ASSISTANT

## 2025-05-13 PROCEDURE — 1160F RVW MEDS BY RX/DR IN RCRD: CPT | Mod: CPTII,S$GLB,, | Performed by: PHYSICIAN ASSISTANT

## 2025-05-13 PROCEDURE — 99214 OFFICE O/P EST MOD 30 MIN: CPT | Mod: S$GLB,,, | Performed by: PHYSICIAN ASSISTANT

## 2025-05-13 PROCEDURE — 1126F AMNT PAIN NOTED NONE PRSNT: CPT | Mod: CPTII,S$GLB,, | Performed by: PHYSICIAN ASSISTANT

## 2025-05-13 PROCEDURE — 1159F MED LIST DOCD IN RCRD: CPT | Mod: CPTII,S$GLB,, | Performed by: PHYSICIAN ASSISTANT

## 2025-05-13 PROCEDURE — 99999 PR PBB SHADOW E&M-EST. PATIENT-LVL III: CPT | Mod: PBBFAC,,, | Performed by: PHYSICIAN ASSISTANT

## 2025-05-13 PROCEDURE — 3008F BODY MASS INDEX DOCD: CPT | Mod: CPTII,S$GLB,, | Performed by: PHYSICIAN ASSISTANT

## 2025-05-20 ENCOUNTER — OFFICE VISIT (OUTPATIENT)
Dept: HEMATOLOGY/ONCOLOGY | Facility: CLINIC | Age: 70
End: 2025-05-20
Payer: MEDICARE

## 2025-05-20 ENCOUNTER — LAB VISIT (OUTPATIENT)
Dept: LAB | Facility: HOSPITAL | Age: 70
End: 2025-05-20
Attending: STUDENT IN AN ORGANIZED HEALTH CARE EDUCATION/TRAINING PROGRAM
Payer: MEDICARE

## 2025-05-20 VITALS
HEART RATE: 88 BPM | BODY MASS INDEX: 22.51 KG/M2 | DIASTOLIC BLOOD PRESSURE: 88 MMHG | SYSTOLIC BLOOD PRESSURE: 127 MMHG | OXYGEN SATURATION: 98 % | RESPIRATION RATE: 18 BRPM | HEIGHT: 62 IN | WEIGHT: 122.31 LBS | TEMPERATURE: 98 F

## 2025-05-20 DIAGNOSIS — D05.11 DUCTAL CARCINOMA IN SITU (DCIS) OF RIGHT BREAST: Primary | ICD-10-CM

## 2025-05-20 DIAGNOSIS — Z79.811 LONG TERM CURRENT USE OF AROMATASE INHIBITOR: ICD-10-CM

## 2025-05-20 DIAGNOSIS — D05.11 DUCTAL CARCINOMA IN SITU (DCIS) OF RIGHT BREAST: ICD-10-CM

## 2025-05-20 LAB
ALBUMIN SERPL-MCNC: 3.9 G/DL (ref 3.4–4.8)
ALBUMIN/GLOB SERPL: 1.1 RATIO (ref 1.1–2)
ALP SERPL-CCNC: 65 UNIT/L (ref 40–150)
ALT SERPL-CCNC: 16 UNIT/L (ref 0–55)
ANION GAP SERPL CALC-SCNC: 6 MEQ/L
AST SERPL-CCNC: 22 UNIT/L (ref 11–45)
BASOPHILS # BLD AUTO: 0.02 X10(3)/MCL
BASOPHILS NFR BLD AUTO: 0.3 %
BILIRUB SERPL-MCNC: 0.4 MG/DL
BUN SERPL-MCNC: 6.7 MG/DL (ref 9.8–20.1)
CALCIUM SERPL-MCNC: 9.2 MG/DL (ref 8.4–10.2)
CHLORIDE SERPL-SCNC: 101 MMOL/L (ref 98–107)
CO2 SERPL-SCNC: 25 MMOL/L (ref 23–31)
CREAT SERPL-MCNC: 0.79 MG/DL (ref 0.55–1.02)
CREAT/UREA NIT SERPL: 8
EOSINOPHIL # BLD AUTO: 1.63 X10(3)/MCL (ref 0–0.9)
EOSINOPHIL NFR BLD AUTO: 25.6 %
ERYTHROCYTE [DISTWIDTH] IN BLOOD BY AUTOMATED COUNT: 12.9 % (ref 11.5–17)
GFR SERPLBLD CREATININE-BSD FMLA CKD-EPI: >60 ML/MIN/1.73/M2
GLOBULIN SER-MCNC: 3.4 GM/DL (ref 2.4–3.5)
GLUCOSE SERPL-MCNC: 249 MG/DL (ref 82–115)
HCT VFR BLD AUTO: 36.8 % (ref 37–47)
HGB BLD-MCNC: 12.5 G/DL (ref 12–16)
IMM GRANULOCYTES # BLD AUTO: 0.01 X10(3)/MCL (ref 0–0.04)
IMM GRANULOCYTES NFR BLD AUTO: 0.2 %
LYMPHOCYTES # BLD AUTO: 1.99 X10(3)/MCL (ref 0.6–4.6)
LYMPHOCYTES NFR BLD AUTO: 31.3 %
MCH RBC QN AUTO: 32.6 PG (ref 27–31)
MCHC RBC AUTO-ENTMCNC: 34 G/DL (ref 33–36)
MCV RBC AUTO: 95.8 FL (ref 80–94)
MONOCYTES # BLD AUTO: 0.33 X10(3)/MCL (ref 0.1–1.3)
MONOCYTES NFR BLD AUTO: 5.2 %
NEUTROPHILS # BLD AUTO: 2.38 X10(3)/MCL (ref 2.1–9.2)
NEUTROPHILS NFR BLD AUTO: 37.4 %
PLATELET # BLD AUTO: 259 X10(3)/MCL (ref 130–400)
PMV BLD AUTO: 9 FL (ref 7.4–10.4)
POTASSIUM SERPL-SCNC: 4 MMOL/L (ref 3.5–5.1)
PROT SERPL-MCNC: 7.3 GM/DL (ref 5.8–7.6)
RBC # BLD AUTO: 3.84 X10(6)/MCL (ref 4.2–5.4)
SODIUM SERPL-SCNC: 132 MMOL/L (ref 136–145)
WBC # BLD AUTO: 6.36 X10(3)/MCL (ref 4.5–11.5)

## 2025-05-20 PROCEDURE — 85025 COMPLETE CBC W/AUTO DIFF WBC: CPT

## 2025-05-20 PROCEDURE — 3079F DIAST BP 80-89 MM HG: CPT | Mod: CPTII,S$GLB,,

## 2025-05-20 PROCEDURE — 1159F MED LIST DOCD IN RCRD: CPT | Mod: CPTII,S$GLB,,

## 2025-05-20 PROCEDURE — 36415 COLL VENOUS BLD VENIPUNCTURE: CPT

## 2025-05-20 PROCEDURE — G2211 COMPLEX E/M VISIT ADD ON: HCPCS | Mod: S$GLB,,,

## 2025-05-20 PROCEDURE — 99215 OFFICE O/P EST HI 40 MIN: CPT | Mod: S$GLB,,,

## 2025-05-20 PROCEDURE — 80053 COMPREHEN METABOLIC PANEL: CPT

## 2025-05-20 PROCEDURE — 1126F AMNT PAIN NOTED NONE PRSNT: CPT | Mod: CPTII,S$GLB,,

## 2025-05-20 PROCEDURE — 1160F RVW MEDS BY RX/DR IN RCRD: CPT | Mod: CPTII,S$GLB,,

## 2025-05-20 PROCEDURE — 3052F HG A1C>EQUAL 8.0%<EQUAL 9.0%: CPT | Mod: CPTII,S$GLB,,

## 2025-05-20 PROCEDURE — 3074F SYST BP LT 130 MM HG: CPT | Mod: CPTII,S$GLB,,

## 2025-05-20 PROCEDURE — 99999 PR PBB SHADOW E&M-EST. PATIENT-LVL IV: CPT | Mod: PBBFAC,,,

## 2025-05-20 PROCEDURE — 3008F BODY MASS INDEX DOCD: CPT | Mod: CPTII,S$GLB,,

## 2025-05-20 RX ORDER — ANASTROZOLE 1 MG/1
1 TABLET ORAL DAILY
Qty: 90 TABLET | Refills: 0 | Status: SHIPPED | OUTPATIENT
Start: 2025-05-20 | End: 2026-05-20

## 2025-05-20 NOTE — PROGRESS NOTES
Subjective:       Patient ID: Daisy Calderon is a 69 y.o. female.    Chief Complaint: Follow up    Diagnosis: DCIS Right Breast    Current Treatment:  Arimidex 12/2024- present    Treatment History:   Right Breast Mastectomy 8/5/24 - Dr. Cohen  Letrozole 2.5mg po daily 8/2024- 10/29/2024 (stopped due to side effects)    HPI:   68 yo F presents in August '24 for evaluation of right breast DCIS. In December '23 she had abnormal screening MMG with calcifications noted in the UOQ of R breast spanning 5 cm. June '24 biopsy at 10 o'clock position with DCIS, G2, ER 92%,   SC 86%. 12 o'clock position biopsy returned with benign findings. She underwent right mastectomy on 8/5/24 with Dr. Cohen, final pathology with 9mm G2 DCIS. All margins negative. 1 LN without evidence of invasive disease.     Her diagnosis, staging, and prognosis were discussed. The NCCN guidelines when discussing next steps in her treatment plan were referenced. She was started on Letrozole for AI therapy in the adjuvant setting. Due to side effects Letrozole was discontinued and she was started on Arimidex and is tolerating well.     The mechanism, benefits, and side effect profile of Arimidex was reviewed with patient. The benefits described include reduced risk of contralateral breast cancer and reduced risk of distant metastatic disease. Side effects discussed include joint pain, hot flashes, vaginal dryness, arthralgias and loss of bone mineral density. The need for DEXA scan to monitor bone mineral density as well as supplemental calcium and vitamin D were discussed.     Interval History:   Patient here today for 3 month NP follow up appointment and labs for toxicity check while taking Arimidex.   Overall she states she feels well.  Reports diarrhea and weight loss.   She tries to remain active given her arthralgias.   She is managing with Glucosamine and that helps  She does have itching to right chest incision site post mastectomy  She is  currently moisturizing twice daily.   Appetite and energy levels are stable.   Otherwise no major issues or concerns.       Past Medical History:   Diagnosis Date    Broken foot 2024    Diabetes type I     Ductal carcinoma in situ (DCIS) of right breast     HTN (hypertension)     Hypothyroidism, unspecified     Tobacco user       Past Surgical History:   Procedure Laterality Date    BREAST SURGERY  2024    CARPAL TUNNEL RELEASE      CATARACT EXTRACTION       SECTION      CHOLECYSTECTOMY      COLON SURGERY  Polip removal    EYE SURGERY      Laser for bleeding over 5 years ago, cataracts removed from both eyes    SIMPLE MASTECTOMY Right 2024    Procedure: MASTECTOMY, SIMPLE  ///right, MagTrace to be Injected Intra-operatively;  Surgeon: Cindy Cohen MD;  Location: [facility] OR;  Service: General;  Laterality: Right;  MagTrace to be Injected Intra-operatively    SURGICAL REMOVAL OF LYMPH NODE Right 2024    Procedure: EXCISION, LYMPH NODE  ///right, Send fresh for flow cytometry, Send for Pathology;  Surgeon: Cindy Cohen MD;  Location: [facility] OR;  Service: General;  Laterality: Right;  Send fresh for flow cytometry  Send for Pathology    TOTAL ABDOMINAL HYSTERECTOMY      TUBAL LIGATION       Social History     Socioeconomic History    Marital status: Single   Tobacco Use    Smoking status: Former     Current packs/day: 0.25     Types: Cigarettes    Smokeless tobacco: Never   Substance and Sexual Activity    Alcohol use: Not Currently     Alcohol/week: 6.0 standard drinks of alcohol     Comment: quit 4 months ago    Drug use: Never    Sexual activity: Not Currently     Partners: Male     Social Drivers of Health     Financial Resource Strain: Low Risk  (3/29/2025)    Overall Financial Resource Strain (CARDIA)     Difficulty of Paying Living Expenses: Not hard at all   Food Insecurity: No Food Insecurity (3/29/2025)    Hunger Vital Sign     Worried About Running Out of Food in the Last  Year: Never true     Ran Out of Food in the Last Year: Never true   Transportation Needs: No Transportation Needs (3/29/2025)    PRAPARE - Transportation     Lack of Transportation (Medical): No     Lack of Transportation (Non-Medical): No   Physical Activity: Inactive (3/29/2025)    Exercise Vital Sign     Days of Exercise per Week: 0 days     Minutes of Exercise per Session: 0 min   Stress: No Stress Concern Present (3/29/2025)    [employer] - Occupational Stress Questionnaire     Feeling of Stress : Not at all   Housing Stability: Low Risk  (3/29/2025)    Housing Stability Vital Sign     Unable to Pay for Housing in the Last Year: No     Number of Times Moved in the Last Year: 0     Homeless in the Last Year: No      Family History   Problem Relation Name Age of Onset    Breast cancer Sister Mari Mccarty 65    Arthritis Sister Mari Mccarty       Review of patient's allergies indicates:   Allergen Reactions    Aspirin      Other Reaction(s): Unknown    Levofloxacin      Other reaction(s): itching, swelling    Oxycodone      Other Reaction(s): Unknown    Penicillin      Other Reaction(s): Unknown    Brimonidine Nausea And Vomiting      Review of Systems   Constitutional:  Positive for fatigue.   HENT: Negative.     Eyes: Negative.    Respiratory: Negative.     Cardiovascular: Negative.    Gastrointestinal: Negative.    Endocrine: Negative.    Genitourinary: Negative.    Musculoskeletal:  Positive for arthralgias.   Integumentary:  Negative.   Allergic/Immunologic: Negative.    Hematological: Negative.    Psychiatric/Behavioral:  The patient is nervous/anxious.          Objective:      Vitals:    05/20/25 1417   BP: 127/88   Pulse: 88   Resp: 18   Temp: 97.9 °F (36.6 °C)     Physical Exam  HENT:      Head: Normocephalic.      Right Ear: Tympanic membrane normal.      Left Ear: Tympanic membrane normal.      Nose: Nose normal.   Eyes:      Extraocular Movements: Extraocular movements  intact.      Pupils: Pupils are equal, round, and reactive to light.   Cardiovascular:      Rate and Rhythm: Normal rate and regular rhythm.      Pulses: Normal pulses.      Heart sounds: Normal heart sounds.   Pulmonary:      Effort: Pulmonary effort is normal.      Breath sounds: Normal breath sounds.   Abdominal:      General: Abdomen is flat.      Palpations: Abdomen is soft.   Genitourinary:     Comments: Exam deferred  Musculoskeletal:      Cervical back: Normal range of motion.   Skin:     General: Skin is warm and dry.      Comments: Right mastectomy incision healed well   Neurological:      Mental Status: She is alert and oriented to person, place, and time.   Psychiatric:         Mood and Affect: Mood normal.   BREAST EXAM; deferred per patient        2025   [facility] PEQ ENDOCRINE THERAPY   I have hot flashes/hot flushes 0   I have vaginal discharge 0   I have vaginal itching/irritation 0   I have vaginal bleeding or spotting 0   I have vaginal dryness 0   I have pain or discomfort with intercourse 0   I have lost interest in sex 0   I have gained weight 0   I have mood swings 1   I am irritable 1   I have pain in my joints 3         LABS AND IMAGING REVIEWED IN EPIC    Imagin2023  BL SC MG at [facility]  IMPRESSION: INCOMPLETE: NEEDS ADDITIONAL IMAGING   -In the R breast upper outer quadrant middle depth, there are indeterminate calcifications.  -No other significant masses, calcifications, or other findings are seen in either breast.       2024  R DG MG at [facility]  IMPRESSION: SUSPICIOUS OF MALIGNANCY   -Amorphous calcifications in a regional distribution spanning 5.0 cm in the upper-outer quadrant of the R breast, anterior to middle depths.      2024  BL BREAST MRI  IMPRESSION: INCOMPLETE: NEEDS ADDITIONAL IMAGING EVALUATION    1.  No suspicious enhancement in either breast.    2.  Right breast 10 o'clock middle to posterior depths metallic clip and surrounding post biopsy changes related  to previous stereotactic biopsy of 6/17/2024, with pathology revealing ductal carcinoma in-situ.    3.  No suspicious enhancement at the site of recent benign stereotactic biopsy of calcifications at 12 o'clock right breast anterior depth.  4.  Several prominent level 1 lymph nodes within bilateral axillae with cortical thickening and partial hilar effacement, slightly more prominent in the right axilla compared to the left axilla, are of uncertain clinical significance.     MMG 12/17/2024  IMPRESSION: BENIGN  No mammographic evidence of malignancy in the left breast.     There is no mammographic evidence of malignancy.      Pathology:  6/17/2024  Stereotactic guided Core Needle Biopsy Right Breast 10 o'clock Middle Depth Calcifications  -Ductal carcinoma in situ, grade 2 with solid, cribriform and micropapillary architecture, focal comedo necrosis and multifocal microcalcifications  -Background nonproliferative fibrocystic changes with sclerosing adenosis and focal minute sclerosing intraductal papilloma  ER 92%  [place] 86%     6/17/2024  Stereotactic guided Core Needle Biopsy Right Breast 12 o'clock Anterior Depth Calcifications  -Nonproliferative fibrocystic changes with extensive sclerosing adenosis and microcalcifications  -No evidence of significant epithelial hyperplasia atypia or malignancy identified     8/5/2024  Right Mastectomy- Ductal carcinoma in situ, grade 2,  solid and cribriform, with focal necrosis and calcification (measuring 9 mm); focal sclerosing adenosis; focal fibroadenomatoid change; fibrocystic change.  All margins clear.  One lymph node with no evidence of neoplasm.      Assessment:   Right Breast DCIS - ER+/WA+. S/p mastectomy August '24. She transitioned into AI x 5 years in adjuvant setting; however experienced extensive fatigue and dizziness. Letrozole have been discontinued. Fatigue have improve. She have switched Arimidex for AI therapy  and tolerating well. Will continue for now.      Dexa scan 7/27/26  MMG due 12/2025  Plan:   - Toxicity reviewed; Okay to continue Arimidex 1mg po daily; refill sent today  - RTC 3 months for NP visit, labs same day      I spent a total of 40 minutes on the day of the visit.This includes face to face time and non-face to face time preparing to see the patient (eg, review of tests), obtaining and/or reviewing separately obtained history, documenting clinical information in the electronic or other health record, independently interpreting results and communicating results to the patient/family/caregiver, or care coordinator.      Visit today included increased complexity associated with the care of the episodic problem DCIS Right Breast, addressing and managing the longitudinal care of the patient's DCIS Right Breast.    ALIVIA Red  Hematology/Oncology   [facility]

## 2025-07-14 ENCOUNTER — PATIENT OUTREACH (OUTPATIENT)
Facility: CLINIC | Age: 70
End: 2025-07-14
Payer: MEDICARE

## 2025-07-14 NOTE — PROGRESS NOTES
Population Health Outreach.    Health Maintenance Topic(s) Outreach Outcomes & Actions Taken:  Eye Exam - Outreach Outcomes & Actions Taken  : External Records Requested & Care Team Updated if Applicable   Consulting Physician, Ophthalmology: Igor Mosqueda Jr., MD  MATT sent to [place].

## 2025-07-14 NOTE — LETTER
AUTHORIZATION FOR RELEASE OF   CONFIDENTIAL INFORMATION        We are seeing Daisy Calderon, date of birth 1955, in the clinic at [place] [address] [facility]NAL MEDIC[facility]. Yana Pearce MD is the patient's PCP. Daisy Calderon has an outstanding lab/procedure at the time we reviewed her chart. In order to help keep her health information updated, she has authorized us to request the following medical record(s):                                               ( X )  EYE EXAM                Please fax records to [facility], Bhanushali, Reshma A, MD,  at [phone] or email to [email or web address].       Patient Name: Daisy Calderon  : 1955  Patient Phone #: [phone]

## 2025-07-24 DIAGNOSIS — Z13.228 SCREENING FOR ENDOCRINE, NUTRITIONAL, METABOLIC AND IMMUNITY DISORDER: ICD-10-CM

## 2025-07-24 DIAGNOSIS — Z13.89 SCREENING FOR CARDIOVASCULAR, RESPIRATORY, AND GENITOURINARY DISEASES: ICD-10-CM

## 2025-07-24 DIAGNOSIS — I15.2 HYPERTENSION DUE TO ENDOCRINE DISORDER: ICD-10-CM

## 2025-07-24 DIAGNOSIS — E03.9 ACQUIRED HYPOTHYROIDISM: ICD-10-CM

## 2025-07-24 DIAGNOSIS — Z13.0 SCREENING FOR ENDOCRINE, NUTRITIONAL, METABOLIC AND IMMUNITY DISORDER: ICD-10-CM

## 2025-07-24 DIAGNOSIS — Z13.83 SCREENING FOR CARDIOVASCULAR, RESPIRATORY, AND GENITOURINARY DISEASES: ICD-10-CM

## 2025-07-24 DIAGNOSIS — Z00.00 MEDICARE ANNUAL WELLNESS VISIT, SUBSEQUENT: ICD-10-CM

## 2025-07-24 DIAGNOSIS — Z13.6 SCREENING FOR CARDIOVASCULAR, RESPIRATORY, AND GENITOURINARY DISEASES: ICD-10-CM

## 2025-07-24 DIAGNOSIS — E10.3593 TYPE 1 DIABETES MELLITUS WITH PROLIFERATIVE DIABETIC RETINOPATHY WITHOUT MACULAR EDEMA, BILATERAL: Primary | ICD-10-CM

## 2025-07-24 DIAGNOSIS — Z13.21 SCREENING FOR ENDOCRINE, NUTRITIONAL, METABOLIC AND IMMUNITY DISORDER: ICD-10-CM

## 2025-07-24 DIAGNOSIS — M81.0 OSTEOPOROSIS, UNSPECIFIED OSTEOPOROSIS TYPE, UNSPECIFIED PATHOLOGICAL FRACTURE PRESENCE: ICD-10-CM

## 2025-07-24 DIAGNOSIS — Z13.29 SCREENING FOR ENDOCRINE, NUTRITIONAL, METABOLIC AND IMMUNITY DISORDER: ICD-10-CM

## 2025-07-29 ENCOUNTER — TELEPHONE (OUTPATIENT)
Dept: INTERNAL MEDICINE | Facility: CLINIC | Age: 70
End: 2025-07-29
Payer: MEDICARE

## 2025-08-04 ENCOUNTER — OFFICE VISIT (OUTPATIENT)
Dept: INTERNAL MEDICINE | Facility: CLINIC | Age: 70
End: 2025-08-04
Payer: MEDICARE

## 2025-08-04 VITALS
BODY MASS INDEX: 20.8 KG/M2 | OXYGEN SATURATION: 96 % | HEART RATE: 87 BPM | SYSTOLIC BLOOD PRESSURE: 118 MMHG | WEIGHT: 113 LBS | DIASTOLIC BLOOD PRESSURE: 63 MMHG | HEIGHT: 62 IN

## 2025-08-04 DIAGNOSIS — R52 PAIN: ICD-10-CM

## 2025-08-04 DIAGNOSIS — E03.9 ACQUIRED HYPOTHYROIDISM: ICD-10-CM
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
## 2025-08-04 DIAGNOSIS — E10.3593 TYPE 1 DIABETES MELLITUS WITH PROLIFERATIVE DIABETIC RETINOPATHY WITHOUT MACULAR EDEMA, BILATERAL: ICD-10-CM

## 2025-08-04 DIAGNOSIS — Z23 NEED FOR SHINGLES VACCINE: ICD-10-CM

## 2025-08-04 DIAGNOSIS — Z00.00 MEDICARE ANNUAL WELLNESS VISIT, SUBSEQUENT: Primary | ICD-10-CM

## 2025-08-04 PROCEDURE — 1158F ADVNC CARE PLAN TLK DOCD: CPT | Mod: CPTII,,, | Performed by: INTERNAL MEDICINE

## 2025-08-04 PROCEDURE — 3074F SYST BP LT 130 MM HG: CPT | Mod: CPTII,,, | Performed by: INTERNAL MEDICINE

## 2025-08-04 PROCEDURE — 3008F BODY MASS INDEX DOCD: CPT | Mod: CPTII,,, | Performed by: INTERNAL MEDICINE

## 2025-08-04 PROCEDURE — 3288F FALL RISK ASSESSMENT DOCD: CPT | Mod: CPTII,,, | Performed by: INTERNAL MEDICINE

## 2025-08-04 PROCEDURE — 99214 OFFICE O/P EST MOD 30 MIN: CPT | Mod: 25,,, | Performed by: INTERNAL MEDICINE

## 2025-08-04 PROCEDURE — 1160F RVW MEDS BY RX/DR IN RCRD: CPT | Mod: CPTII,,, | Performed by: INTERNAL MEDICINE

## 2025-08-04 PROCEDURE — 1159F MED LIST DOCD IN RCRD: CPT | Mod: CPTII,,, | Performed by: INTERNAL MEDICINE

## 2025-08-04 PROCEDURE — 1125F AMNT PAIN NOTED PAIN PRSNT: CPT | Mod: CPTII,,, | Performed by: INTERNAL MEDICINE

## 2025-08-04 PROCEDURE — 1101F PT FALLS ASSESS-DOCD LE1/YR: CPT | Mod: CPTII,,, | Performed by: INTERNAL MEDICINE

## 2025-08-04 PROCEDURE — 3046F HEMOGLOBIN A1C LEVEL >9.0%: CPT | Mod: CPTII,,, | Performed by: INTERNAL MEDICINE

## 2025-08-04 PROCEDURE — 1124F ACP DISCUSS-NO DSCNMKR DOCD: CPT | Mod: CPTII,,, | Performed by: INTERNAL MEDICINE

## 2025-08-04 PROCEDURE — 3078F DIAST BP <80 MM HG: CPT | Mod: CPTII,,, | Performed by: INTERNAL MEDICINE

## 2025-08-04 PROCEDURE — G0439 PPPS, SUBSEQ VISIT: HCPCS | Mod: ,,, | Performed by: INTERNAL MEDICINE

## 2025-08-04 RX ORDER — DENOSUMAB 60 MG/ML
60 INJECTION SUBCUTANEOUS
COMMUNITY

## 2025-08-04 NOTE — LETTER
AUTHORIZATION FOR RELEASE OF   CONFIDENTIAL INFORMATION    Dear Dr Beltrán    We are seeing Daisy Calderon, date of birth 1955, in the clinic at [place] [address] [facility]. Yana Pearce MD is the patient's PCP. Daisy Calderon has an outstanding lab/procedure at the time we reviewed her chart. In order to help keep her health information updated, she has authorized us to request the following medical record(s):        (  )  MAMMOGRAM                                      (  )  COLONOSCOPY      (  )  PAP SMEAR                                          ( X )  OUTSIDE LAB RESULTS     (  )  DEXA SCAN                                          (  )  EYE EXAM            (  )  FOOT EXAM                                          (  )  ENTIRE RECORD     (  )  OUTSIDE IMMUNIZATIONS                 ( X ) OFFICE NOTES         Please fax records to Yana Pearce MD, [phone]     If you have any questions, please contact our office at [phone].           Patient Name: Daisy Calderon  : 1955  Patient Phone #: [phone]

## 2025-08-04 NOTE — ASSESSMENT & PLAN NOTE
Stable   On insulin pump   Followed by endocrinology  -patient advised on the importance of avoiding excessive carbohydrates and sugary food intake and having some form of routine aerobic exercise on a regular basis.

## 2025-08-04 NOTE — PROGRESS NOTES
"   Internal Medicine    Daisy Calderon is a 70 y.o. female here today for a Medicare Annual Wellness visit and comprehensive Health Risk Assessment.     Subjective     Ms. Singletary is a 70-year-old female who is here today for her Medicare annual wellness visit.  Medical comorbidities include diabetes mellitus type 1, hypothyroidism and diagnosis of DCIS status post right mastectomy with axillary lymph node excision and intolerance to letrozole , now on anastrozole and seems to be doing well.  Patient is doing well overall except for hemoglobin A1c has been elevated.    A separate E/M visit was performed for a fall with right hip pain and right index finger pain   Upon examination right index finger is deformed, raising the possibility of a fracture.  We discussed getting an x-ray done and will evaluate further.  If need be orthopedic referral will be sent.      M CW: 08/04/2025    The following components were reviewed and updated:  Medical history  Family History  Social history  Allergies  Current Medications  Immunizations  Health Maintenance  Patient Care Team    Review of Systems  A comprehensive review of systems was conducted and is negative except as noted above.     Objective   Visit Vitals  /63 (BP Location: Left arm, Patient Position: Sitting)   Pulse 87   Ht 5' 2" (1.575 m)   Wt 51.3 kg (113 lb)   SpO2 96%   BMI 20.67 kg/m²       Physical Exam  Constitutional:       Appearance: Normal appearance.   HENT:      Head: Normocephalic and atraumatic.      Nose: Nose normal.      Mouth/Throat:      Mouth: Mucous membranes are moist.      Pharynx: Oropharynx is clear.   Eyes:      Extraocular Movements: Extraocular movements intact.      Pupils: Pupils are equal, round, and reactive to light.   Cardiovascular:      Rate and Rhythm: Normal rate and regular rhythm.      Pulses: Normal pulses.           Dorsalis pedis pulses are 2+ on the right side and 2+ on the left side.   Pulmonary:      Effort: Pulmonary " effort is normal.      Breath sounds: Normal breath sounds.   Abdominal:      General: Bowel sounds are normal.      Palpations: Abdomen is soft.   Musculoskeletal:         General: Swelling and deformity (Right index finger) present. Normal range of motion.      Cervical back: Normal range of motion and neck supple.   Feet:      Right foot:      Protective Sensation: 3 sites tested.  3 sites sensed.      Skin integrity: No ulcer, skin breakdown or erythema.      Toenail Condition: Right toenails are normal.      Left foot:      Protective Sensation: 3 sites tested.  3 sites sensed.      Skin integrity: No ulcer, skin breakdown or erythema.      Toenail Condition: Left toenails are normal.   Skin:     General: Skin is warm.   Neurological:      General: No focal deficit present.      Mental Status: She is alert and oriented to person, place, and time. Mental status is at baseline.   Psychiatric:         Mood and Affect: Mood normal.          Assessment/Plan:  1. Medicare annual wellness visit, subsequent  Assessment & Plan:  -labs are completed with endocrinology, request records   -patient is advised on importance of watching her carbohydrate intake and saturated fat intake, making the right nutritional choices and exercising on a regular basis  -up-to-date with the screening         2. Need for shingles vaccine  -     varicella-zoster [id number], PF, (SHINGRIX) 50 mcg/0.5 mL injection; Inject 0.5 mLs into the muscle once. for 1 dose  Dispense: 1 each; Refill: 0    3. Pain  Assessment & Plan:  -patient has sustained a fall on her right side with right hip pain and right index finger pain   -upon further examination right index finger appears to be a little deformed, possible fracture   -x-rays will be obtained and will follow-up on results   -therapy services   -orthopedics referral if needed   -Tylenol PRN    Orders:  -     X-Ray Hip 2 or 3 views Right with Pelvis when performed; Future; Expected date: 08/04/2025  -      X-Ray Finger 2 or More Views Right; Future; Expected date: 08/04/2025    4. Acquired hypothyroidism  Assessment & Plan:  Continue levothyroxine 125 mcg p.o. daily  Take medicine on an empty stomach with water (no other medications or beverages). Wait 30 minutes to eat or drink.  Report any symptoms of thinning hair, breaking nails, fatigue, weight gain or loss, palpitations.      5. 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       6. Fall, initial encounter  Assessment & Plan:  -patient has sustained a fall on her right side with right hip pain and right index finger pain   -upon further examination right index finger appears to be a little deformed, possible fracture   -x-rays will be obtained and will follow-up on results   -therapy services   -orthopedics referral if needed   -Tylenol PRN        A comprehensive HEALTH RISK ASSESSMENT was completed today. Results are summarized below:    There are NO EMOTIONAL/SOCIAL CONCERNS identified on today's screening for Social Isolation, Depression and Anxiety.    There are NO COGNITIVE FUNCTION CONCERNS identified on today's screening.  The following FUNCTIONAL AND/OR SAFETY CONCERNS were identified on today's screening for Physical Symptoms, Nutritional, Home Safety/Living Situation, Fall Risk, Activities of Daily Living, Independent Activities of Daily Living, Physical Activity,Timed Up and Go test and Whisper test::  *Patient reports PROBLEMS WITH BLADDER FUNCTION. (Do you have any problems with urination like going too frequently, trouble urinating, leakage or accidents?: (!) Yes)  *Patient reports recently FEELING DIZZY, has a FEAR OF FALLING or HAS FALLEN. (In the past four  weeks have you felt dizzy when standing up, were afraid of falling or fallen?: (!) Yes)     The patient reports NO OPIOID PRESCRIPTIONS. This was confirmed through medication reconciliation.    The patient is NOT A TOBACCO USER.  The patient reports NO SIGNIFICANT ALCOHOL USE.     All Questions regarding food, transportation or housing were not answered today.    I provided Daisy Calderon with a 5-10 year written Screening Schedule per USPSTF age appropriate recommendations and a Personal Prevention Plan based on the results of today's Health Risk Assessment. Education, counseling, and referrals were provided as documented above and can be viewed in the After Visit Summary.    Follow up in about 6 months (around 2/4/2026) for BP Check, DM FU. In addition to this scheduled follow up, the patient has also been instructed to follow up on as needed basis.     none  The patient was asked and declined the use of a free .  Advance Care Planning   Today we discussed advance care planning. She is interested in learning more about how to make Advance Directives. Information was provided and I offered to discuss more at her discretion.    Advance Care Planning     Date: 08/04/2025  Patient did not wish or was not able to name a surrogate decision maker or provide an Advance Care Plan.

## 2025-08-04 NOTE — PATIENT INSTRUCTIONS
Medicare Annual Wellness Visit      Patient Name: Daisy Calderon  Today's Date: 8/4/2025    Below you will find your 5-10 year Screening Plan Recommendations:  Health Maintenance       Date Due Completion Date    Hepatitis C Screening Never done ---    Diabetes Urine Screening Never done ---    Shingles Vaccine (1 of 2) Never done ---    TETANUS VACCINE 10/25/2015 10/25/2005    RSV Vaccine (Age 60+ and Pregnant patients) (1 - Risk 60-74 years 1-dose series) Never done ---    Foot Exam 08/31/2024 8/31/2023    Lipid Panel 02/06/2025 2/6/2024    Influenza Vaccine (1) 09/01/2025 12/2/2024    Hemoglobin A1c 09/16/2025 6/16/2025    Mammogram 12/17/2025 12/17/2024    Colorectal Cancer Screening 02/06/2026 2/6/2023    Diabetic Eye Exam 04/08/2026 4/8/2025    Low Dose Statin 05/13/2026 5/13/2025    DEXA Scan 10/14/2026 10/14/2024          Below is your summarized Personalized Prevention Plan that addresses any concerns we discussed today at your visit. Please see attached detailed information specific to your Health Concerns.  No orders of the defined types were placed in this encounter.        The following information is provided to all patients.  This information is to help you find additional resources for any problems that may be affecting your health: Living healthy guide: www.[facility].[place].gov      Understanding Diabetes: www.diabetes.org      Eating healthy: www.cdc.gov/healthyweight      CDC home safety checklist: www.cdc.gov/steadi/patient.html      Agency on Aging: www.goea.[place].[facility]      Alcoholics anonymous (AA): www.aa.org      Physical Activity: www.alondra.nih.gov/xb5vxmk      Tobacco use: www.quitwithusla.org                                 Patient Education     Exercise and Aging   General   Exercise can help older adults prevent falls and stay independent longer. Exercise may also help:  You have stronger muscles and bones and better balance  You lose weight and have more energy  Make your heart and  lungs stronger  Lower your chance of health problems like heart disease, high blood sugar, or breast or colon cancer  Control conditions like high blood pressure and diabetes  You manage stress and get better sleep  Your mood, self-esteem, and self-image  How you think, plan, and pay attention  There are four types of exercise: endurance, strength, balance, and flexibility.  Endurance exercises get your heart rate up and keep it up for a while. Most people should get at least 30 minutes of exercise that gets your heart rate up on 5 or more days each week. Walking, swimming, biking, or going up and down stairs are kinds of exercises to get your heart rate up. You do not have to do all 30 minutes at one time. Try doing 10 minutes 3 times a day.  Strength exercises build muscle. Lifting weights or doing knee bends are kinds of strength exercises.  Balance exercises help to prevent falls. Raise up on your toes or stand on one leg as a kind of balance exercise.  Flexibility exercises move your joints through a full range of motion and stretch your muscles. Bend forward in a chair to stretch your back, do stretches, or bend and extend your arms or legs as a kind of flexibility exercise. Try doing 10 minutes twice a week.  Plan to include all these exercise types in your exercise program. Always check with your doctor before you start a new exercise program.  Some people do not like formal exercise classes, but there are many ways to work your muscles each day. Here are some things you can do.  Use steps instead of elevators.  Park far away in parking lots to walk farther.  Use the time while you wait. Do knee bends as you hold onto the kitchen counter while you wait for your coffee to brew.  When you unload groceries, lift the bags or a container of milk a few times to exercise your arms and legs.  Walk the long way when you go somewhere.  After you walk to the bathroom, walk a few laps around the house before sitting  down.  Try doing different standing exercises after you wash your hands each time in the bathroom.  Do balance exercises while you brush your teeth.  Do an exercise or get up and walk during TV commercials.  Don't forget to exercise small muscle groups like your hands, fingers, ankles, toes, and neck. Wiggle, bend, flex, and rotate these joints from left to right and back to front to help to keep these joints flexible.  When do I need to call the doctor?   Stop exercising and talk to your doctor if you have any of these problems:  Dizziness  Shortness of breath  Pain or pressure in the chest, arms, throat, jaw, or back  Nausea or vomiting  Blood clots  Infection  Joint swelling  Open wounds  Recent hip, back, or eye surgery  New problems that start during exercise  Helpful tips   If you have a health problem like heart disease or diabetes, talk with your doctor about the best exercises for you.  Always warm up before you stretch. Heated muscles stretch much easier than cool muscles. Try to walk or bike at an easy pace for a few minutes to warm up your muscles.  Slow your pace again after you exercise to cool down and bring your heart rate down slowly.  Be sure you do not hold your breath when you exercise because it can raise your blood pressure. If you tend to hold your breath, try to count out loud when you exercise.  Never bounce when doing stretches. Use slow and steady motions and hold your stretch for 20 to 30 seconds.  Have a routine. Doing exercises before a meal may be a good way to get into a routine.  Set small goals for yourself when you start to exercise. Use a chart to see how much you are doing. Ask someone to exercise with you.  Exercise may be slightly uncomfortable, but you should not have sharp pains. If you do get sharp pains, stop what you are doing. If the sharp pains continue, call your doctor.  Drink plenty of fluids without caffeine when you exercise and afterwards. Avoid outdoor exercise if  it is too cold or too hot.  Wear the right clothes and shoes. Try layers of clothes, so that you can take them off if you get too hot. Shoes should fit well and support your feet.  Last Reviewed Date   2021-03-18  Consumer Information Use and Disclaimer   This generalized information is a limited summary of diagnosis, treatment, and/or medication information. It is not meant to be comprehensive and should be used as a tool to help the user understand and/or assess potential diagnostic and treatment options. It does NOT include all information about conditions, treatments, medications, side effects, or risks that may apply to a specific patient. It is not intended to be medical advice or a substitute for the medical advice, diagnosis, or treatment of a health care provider based on the health care provider's examination and assessment of a patients specific and unique circumstances. Patients must speak with a health care provider for complete information about their health, medical questions, and treatment options, including any risks or benefits regarding use of medications. This information does not endorse any treatments or medications as safe, effective, or approved for treating a specific patient. [employer] and its affiliates disclaim any warranty or liability relating to this information or the use thereof. The use of this information is governed by the Terms of Use, available at https://www.wolterskluwer.com/en/know/clinical-effectiveness-terms   Copyright   Copyright © 2023 [employer] and its affiliates and/or licensors. All rights reserved.

## 2025-08-04 NOTE — ASSESSMENT & PLAN NOTE
-patient has sustained a fall on her right side with right hip pain and right index finger pain   -upon further examination right index finger appears to be a little deformed, possible fracture   -x-rays will be obtained and will follow-up on results   -therapy services   -orthopedics referral if needed   -Tylenol PRN

## 2025-08-06 ENCOUNTER — HOSPITAL ENCOUNTER (OUTPATIENT)
Dept: RADIOLOGY | Facility: HOSPITAL | Age: 70
Discharge: HOME | End: 2025-08-06
Attending: INTERNAL MEDICINE
Payer: MEDICARE

## 2025-08-06 DIAGNOSIS — R52 PAIN: ICD-10-CM

## 2025-08-06 PROCEDURE — 73502 X-RAY EXAM HIP UNI 2-3 VIEWS: CPT | Mod: TC,RT

## 2025-08-06 PROCEDURE — 73140 X-RAY EXAM OF FINGER(S): CPT | Mod: TC,RT

## 2025-08-07 ENCOUNTER — TELEPHONE (OUTPATIENT)
Dept: INTERNAL MEDICINE | Facility: CLINIC | Age: 70
End: 2025-08-07
Payer: MEDICARE

## 2025-08-07 NOTE — TELEPHONE ENCOUNTER
----- Message from Yana Pearce MD sent at 8/6/2025  4:29 PM CDT -----  Please contact the patient and let them know that their x-rays were both reviewed and showed bilateral changes that were degenerative in nature which are age-related in the right index finger and the   right hip.  There was no acute fracture noted in the index finger  ----- Message -----  From: Interface, Rad Results In  Sent: 8/6/2025   3:53 PM CDT  To: Yana Pearce MD

## 2025-08-18 DIAGNOSIS — D05.11 DUCTAL CARCINOMA IN SITU (DCIS) OF RIGHT BREAST: ICD-10-CM

## 2025-08-18 RX ORDER — ANASTROZOLE 1 MG/1
1 TABLET ORAL DAILY
Qty: 90 TABLET | Refills: 0 | Status: SHIPPED | OUTPATIENT
Start: 2025-08-18

## 2025-08-27 ENCOUNTER — TELEPHONE (OUTPATIENT)
Dept: INTERNAL MEDICINE | Facility: CLINIC | Age: 70
End: 2025-08-27
Payer: MEDICARE

## 2025-08-28 ENCOUNTER — TELEPHONE (OUTPATIENT)
Dept: INTERNAL MEDICINE | Facility: CLINIC | Age: 70
End: 2025-08-28
Payer: MEDICARE

## (undated) DEVICE — SUT 2/0 30IN SILK BLK BRAI

## (undated) DEVICE — RETRACTOR TESSA HANDHELD

## (undated) DEVICE — DRAIN CHANNEL ROUND TRO 15FR

## (undated) DEVICE — NDL SYR 10ML 18X1.5 LL BLUNT

## (undated) DEVICE — BRA MARENA B 38-40 LG BEIGE

## (undated) DEVICE — PAD PREP CUFFED NS 24X48IN

## (undated) DEVICE — STAPLER SKIN PROXIMATE WIDE

## (undated) DEVICE — DRAPE MEDI-SLUSH 44X44IN

## (undated) DEVICE — BLADE SURG STAINLESS STEEL #15

## (undated) DEVICE — GLOVE SENSICARE PI MICRO 6

## (undated) DEVICE — BANDAGE GAUZE COT STRL 4.5X4.1

## (undated) DEVICE — BLADE SURG STAINLESS STEEL #10

## (undated) DEVICE — GLOVE 6.0 PROTEXIS PI MICRO

## (undated) DEVICE — PAD ABDOMINAL STERILE 8X10IN

## (undated) DEVICE — SOL IRRI STRL WATER 1000ML

## (undated) DEVICE — CONTAINER SPECIMEN SCREW 4OZ

## (undated) DEVICE — NDL HYPO REG 25G X 1 1/2

## (undated) DEVICE — SUT MCRYL PLUS 3-0 PS2 27IN

## (undated) DEVICE — ADHESIVE DERMABOND ADVANCED

## (undated) DEVICE — Device

## (undated) DEVICE — ELECTRODE PATIENT RETURN DISP

## (undated) DEVICE — SUT STRATAFIX 4-0 30CM PS-2

## (undated) DEVICE — APPLICATOR CHLORAPREP ORN 26ML

## (undated) DEVICE — BLANKET SNUGGLE WARM LOWER BDY

## (undated) DEVICE — SUPPORT ULNA NERVE PROTECTOR

## (undated) DEVICE — RESERVOIR JACKSON-PRATT 100CC

## (undated) DEVICE — CONTAINER SPECIMEN 4OZ

## (undated) DEVICE — GAUZE DRAIN N WVN 6PLY 4X4IN

## (undated) DEVICE — SPONGE COTTON TRAY 4X4IN

## (undated) DEVICE — STRIP MEDI WND CLSR 1/2X4IN

## (undated) DEVICE — ILLUMINATOR PHOTONBLADE 8/11IN

## (undated) DEVICE — SPONGE LAP 18X18 PREWASHED